# Patient Record
Sex: FEMALE | Race: WHITE | NOT HISPANIC OR LATINO | Employment: OTHER | URBAN - METROPOLITAN AREA
[De-identification: names, ages, dates, MRNs, and addresses within clinical notes are randomized per-mention and may not be internally consistent; named-entity substitution may affect disease eponyms.]

---

## 2017-01-02 ENCOUNTER — APPOINTMENT (OUTPATIENT)
Dept: LAB | Facility: CLINIC | Age: 72
End: 2017-01-02
Payer: COMMERCIAL

## 2017-01-02 DIAGNOSIS — E87.6 HYPOKALEMIA: ICD-10-CM

## 2017-01-02 LAB — POTASSIUM SERPL-SCNC: 3.3 MMOL/L (ref 3.5–5.3)

## 2017-01-02 PROCEDURE — 84132 ASSAY OF SERUM POTASSIUM: CPT

## 2017-01-02 PROCEDURE — 36415 COLL VENOUS BLD VENIPUNCTURE: CPT

## 2017-01-03 ENCOUNTER — GENERIC CONVERSION - ENCOUNTER (OUTPATIENT)
Dept: OTHER | Facility: OTHER | Age: 72
End: 2017-01-03

## 2017-01-06 ENCOUNTER — LAB CONVERSION - ENCOUNTER (OUTPATIENT)
Dept: OTHER | Facility: OTHER | Age: 72
End: 2017-01-06

## 2017-01-06 ENCOUNTER — GENERIC CONVERSION - ENCOUNTER (OUTPATIENT)
Dept: OTHER | Facility: OTHER | Age: 72
End: 2017-01-06

## 2017-01-06 LAB
ADDITIONAL INFORMATION (HISTORICAL): NORMAL
ADEQUACY: (HISTORICAL): NORMAL
COMMENT (HISTORICAL): NORMAL
CYTOTECHNOLOGIST: (HISTORICAL): NORMAL
HPV MRNA E6/E7 (HISTORICAL): NOT DETECTED
INTERPRETATION (HISTORICAL): NORMAL
LMP (HISTORICAL): NORMAL
PREV. BX: (HISTORICAL): NORMAL
PREV. PAP (HISTORICAL): NORMAL
REVIEWED BY (HISTORICAL): NORMAL
SOURCE (HISTORICAL): NORMAL

## 2017-01-09 DIAGNOSIS — E87.6 HYPOKALEMIA: ICD-10-CM

## 2017-01-10 ENCOUNTER — APPOINTMENT (OUTPATIENT)
Dept: LAB | Facility: CLINIC | Age: 72
End: 2017-01-10
Payer: COMMERCIAL

## 2017-01-10 DIAGNOSIS — E87.6 HYPOKALEMIA: ICD-10-CM

## 2017-01-10 LAB — POTASSIUM SERPL-SCNC: 4 MMOL/L (ref 3.5–5.3)

## 2017-01-10 PROCEDURE — 36415 COLL VENOUS BLD VENIPUNCTURE: CPT

## 2017-01-10 PROCEDURE — 84132 ASSAY OF SERUM POTASSIUM: CPT

## 2017-01-11 ENCOUNTER — GENERIC CONVERSION - ENCOUNTER (OUTPATIENT)
Dept: OTHER | Facility: OTHER | Age: 72
End: 2017-01-11

## 2017-02-01 ENCOUNTER — ANESTHESIA EVENT (OUTPATIENT)
Dept: GASTROENTEROLOGY | Facility: HOSPITAL | Age: 72
End: 2017-02-01
Payer: COMMERCIAL

## 2017-02-02 ENCOUNTER — GENERIC CONVERSION - ENCOUNTER (OUTPATIENT)
Dept: OTHER | Facility: OTHER | Age: 72
End: 2017-02-02

## 2017-02-02 ENCOUNTER — ANESTHESIA (OUTPATIENT)
Dept: GASTROENTEROLOGY | Facility: HOSPITAL | Age: 72
End: 2017-02-02
Payer: COMMERCIAL

## 2017-02-02 ENCOUNTER — HOSPITAL ENCOUNTER (OUTPATIENT)
Facility: HOSPITAL | Age: 72
Setting detail: OUTPATIENT SURGERY
Discharge: HOME | End: 2017-02-02
Attending: INTERNAL MEDICINE | Admitting: INTERNAL MEDICINE
Payer: COMMERCIAL

## 2017-02-02 VITALS
RESPIRATION RATE: 16 BRPM | SYSTOLIC BLOOD PRESSURE: 119 MMHG | HEART RATE: 91 BPM | TEMPERATURE: 97.6 F | WEIGHT: 165 LBS | DIASTOLIC BLOOD PRESSURE: 75 MMHG | OXYGEN SATURATION: 98 % | HEIGHT: 65 IN | BODY MASS INDEX: 27.49 KG/M2

## 2017-02-02 DIAGNOSIS — R19.4 CHANGE IN BOWEL HABITS: ICD-10-CM

## 2017-02-02 PROCEDURE — 88305 TISSUE EXAM BY PATHOLOGIST: CPT | Performed by: INTERNAL MEDICINE

## 2017-02-02 RX ORDER — PROPOFOL 10 MG/ML
INJECTION, EMULSION INTRAVENOUS AS NEEDED
Status: DISCONTINUED | OUTPATIENT
Start: 2017-02-02 | End: 2017-02-02 | Stop reason: SURG

## 2017-02-02 RX ORDER — SODIUM CHLORIDE, SODIUM LACTATE, POTASSIUM CHLORIDE, CALCIUM CHLORIDE 600; 310; 30; 20 MG/100ML; MG/100ML; MG/100ML; MG/100ML
125 INJECTION, SOLUTION INTRAVENOUS CONTINUOUS
Status: DISCONTINUED | OUTPATIENT
Start: 2017-02-02 | End: 2017-02-02 | Stop reason: HOSPADM

## 2017-02-02 RX ORDER — HYDROCHLOROTHIAZIDE 25 MG/1
25 TABLET ORAL DAILY
COMMUNITY
End: 2018-03-08

## 2017-02-02 RX ORDER — AMLODIPINE BESYLATE 10 MG/1
10 TABLET ORAL DAILY
COMMUNITY
End: 2018-03-08 | Stop reason: SDUPTHER

## 2017-02-02 RX ORDER — LIDOCAINE HYDROCHLORIDE 10 MG/ML
INJECTION, SOLUTION INFILTRATION; PERINEURAL AS NEEDED
Status: DISCONTINUED | OUTPATIENT
Start: 2017-02-02 | End: 2017-02-02 | Stop reason: SURG

## 2017-02-02 RX ORDER — GLYCOPYRROLATE 0.2 MG/ML
INJECTION INTRAMUSCULAR; INTRAVENOUS AS NEEDED
Status: DISCONTINUED | OUTPATIENT
Start: 2017-02-02 | End: 2017-02-02 | Stop reason: SURG

## 2017-02-02 RX ADMIN — PROPOFOL 100 MG: 10 INJECTION, EMULSION INTRAVENOUS at 11:35

## 2017-02-02 RX ADMIN — LIDOCAINE HYDROCHLORIDE 50 MG: 10 INJECTION, SOLUTION INFILTRATION; PERINEURAL at 11:35

## 2017-02-02 RX ADMIN — PROPOFOL 50 MG: 10 INJECTION, EMULSION INTRAVENOUS at 11:37

## 2017-02-02 RX ADMIN — PROPOFOL 50 MG: 10 INJECTION, EMULSION INTRAVENOUS at 11:40

## 2017-02-02 RX ADMIN — SODIUM CHLORIDE, SODIUM LACTATE, POTASSIUM CHLORIDE, AND CALCIUM CHLORIDE 125 ML/HR: .6; .31; .03; .02 INJECTION, SOLUTION INTRAVENOUS at 10:22

## 2017-02-02 RX ADMIN — GLYCOPYRROLATE 0.2 MG: 0.2 INJECTION INTRAMUSCULAR; INTRAVENOUS at 11:37

## 2017-02-05 ENCOUNTER — GENERIC CONVERSION - ENCOUNTER (OUTPATIENT)
Dept: OTHER | Facility: OTHER | Age: 72
End: 2017-02-05

## 2017-03-13 ENCOUNTER — APPOINTMENT (OUTPATIENT)
Dept: LAB | Facility: CLINIC | Age: 72
End: 2017-03-13
Payer: COMMERCIAL

## 2017-03-13 DIAGNOSIS — E87.6 HYPOKALEMIA: ICD-10-CM

## 2017-03-13 LAB
ALBUMIN SERPL BCP-MCNC: 3.5 G/DL (ref 3.5–5)
ALP SERPL-CCNC: 80 U/L (ref 46–116)
ALT SERPL W P-5'-P-CCNC: 34 U/L (ref 12–78)
ANION GAP SERPL CALCULATED.3IONS-SCNC: 7 MMOL/L (ref 4–13)
AST SERPL W P-5'-P-CCNC: 23 U/L (ref 5–45)
BILIRUB SERPL-MCNC: 0.86 MG/DL (ref 0.2–1)
BUN SERPL-MCNC: 23 MG/DL (ref 5–25)
CALCIUM SERPL-MCNC: 9 MG/DL (ref 8.3–10.1)
CHLORIDE SERPL-SCNC: 106 MMOL/L (ref 100–108)
CO2 SERPL-SCNC: 30 MMOL/L (ref 21–32)
CREAT SERPL-MCNC: 0.82 MG/DL (ref 0.6–1.3)
GFR SERPL CREATININE-BSD FRML MDRD: >60 ML/MIN/1.73SQ M
GLUCOSE SERPL-MCNC: 85 MG/DL (ref 65–140)
POTASSIUM SERPL-SCNC: 3.4 MMOL/L (ref 3.5–5.3)
PROT SERPL-MCNC: 7.4 G/DL (ref 6.4–8.2)
SODIUM SERPL-SCNC: 143 MMOL/L (ref 136–145)

## 2017-03-13 PROCEDURE — 80053 COMPREHEN METABOLIC PANEL: CPT

## 2017-03-13 PROCEDURE — 36415 COLL VENOUS BLD VENIPUNCTURE: CPT

## 2017-03-19 ENCOUNTER — GENERIC CONVERSION - ENCOUNTER (OUTPATIENT)
Dept: OTHER | Facility: OTHER | Age: 72
End: 2017-03-19

## 2017-04-13 ENCOUNTER — ALLSCRIPTS OFFICE VISIT (OUTPATIENT)
Dept: OTHER | Facility: OTHER | Age: 72
End: 2017-04-13

## 2017-07-03 ENCOUNTER — APPOINTMENT (OUTPATIENT)
Dept: LAB | Facility: CLINIC | Age: 72
End: 2017-07-03
Payer: COMMERCIAL

## 2017-07-03 DIAGNOSIS — E87.6 HYPOKALEMIA: ICD-10-CM

## 2017-07-03 LAB — POTASSIUM SERPL-SCNC: 3.5 MMOL/L (ref 3.5–5.3)

## 2017-07-03 PROCEDURE — 84132 ASSAY OF SERUM POTASSIUM: CPT

## 2017-07-03 PROCEDURE — 36415 COLL VENOUS BLD VENIPUNCTURE: CPT

## 2017-07-05 ENCOUNTER — GENERIC CONVERSION - ENCOUNTER (OUTPATIENT)
Dept: OTHER | Facility: OTHER | Age: 72
End: 2017-07-05

## 2017-09-18 ENCOUNTER — APPOINTMENT (OUTPATIENT)
Dept: LAB | Facility: CLINIC | Age: 72
End: 2017-09-18
Payer: COMMERCIAL

## 2017-09-18 ENCOUNTER — TRANSCRIBE ORDERS (OUTPATIENT)
Dept: LAB | Facility: CLINIC | Age: 72
End: 2017-09-18

## 2017-09-18 DIAGNOSIS — I10 ESSENTIAL (PRIMARY) HYPERTENSION: ICD-10-CM

## 2017-09-18 DIAGNOSIS — E87.6 HYPOKALEMIA: ICD-10-CM

## 2017-09-18 DIAGNOSIS — R80.9 PROTEINURIA, UNSPECIFIED TYPE: Primary | ICD-10-CM

## 2017-09-18 LAB
ALBUMIN SERPL BCP-MCNC: 3.4 G/DL (ref 3.5–5)
ALP SERPL-CCNC: 76 U/L (ref 46–116)
ALT SERPL W P-5'-P-CCNC: 43 U/L (ref 12–78)
ANION GAP SERPL CALCULATED.3IONS-SCNC: 6 MMOL/L (ref 4–13)
AST SERPL W P-5'-P-CCNC: 55 U/L (ref 5–45)
BASOPHILS # BLD AUTO: 0.08 THOUSANDS/ΜL (ref 0–0.1)
BASOPHILS NFR BLD AUTO: 1 % (ref 0–1)
BILIRUB SERPL-MCNC: 0.78 MG/DL (ref 0.2–1)
BUN SERPL-MCNC: 20 MG/DL (ref 5–25)
CALCIUM SERPL-MCNC: 9.2 MG/DL (ref 8.3–10.1)
CHLORIDE SERPL-SCNC: 105 MMOL/L (ref 100–108)
CHOLEST SERPL-MCNC: 183 MG/DL (ref 50–200)
CO2 SERPL-SCNC: 32 MMOL/L (ref 21–32)
CREAT SERPL-MCNC: 0.82 MG/DL (ref 0.6–1.3)
EOSINOPHIL # BLD AUTO: 0.16 THOUSAND/ΜL (ref 0–0.61)
EOSINOPHIL NFR BLD AUTO: 2 % (ref 0–6)
ERYTHROCYTE [DISTWIDTH] IN BLOOD BY AUTOMATED COUNT: 12.6 % (ref 11.6–15.1)
GFR SERPL CREATININE-BSD FRML MDRD: 71 ML/MIN/1.73SQ M
GLUCOSE SERPL-MCNC: 79 MG/DL (ref 65–140)
HCT VFR BLD AUTO: 37.6 % (ref 34.8–46.1)
HDLC SERPL-MCNC: 78 MG/DL (ref 40–60)
HGB BLD-MCNC: 12.6 G/DL (ref 11.5–15.4)
LDLC SERPL CALC-MCNC: 89 MG/DL (ref 0–100)
LYMPHOCYTES # BLD AUTO: 2.66 THOUSANDS/ΜL (ref 0.6–4.47)
LYMPHOCYTES NFR BLD AUTO: 35 % (ref 14–44)
MCH RBC QN AUTO: 31.8 PG (ref 26.8–34.3)
MCHC RBC AUTO-ENTMCNC: 33.5 G/DL (ref 31.4–37.4)
MCV RBC AUTO: 95 FL (ref 82–98)
MONOCYTES # BLD AUTO: 0.44 THOUSAND/ΜL (ref 0.17–1.22)
MONOCYTES NFR BLD AUTO: 6 % (ref 4–12)
NEUTROPHILS # BLD AUTO: 4.19 THOUSANDS/ΜL (ref 1.85–7.62)
NEUTS SEG NFR BLD AUTO: 56 % (ref 43–75)
NRBC BLD AUTO-RTO: 0 /100 WBCS
PLATELET # BLD AUTO: 238 THOUSANDS/UL (ref 149–390)
PMV BLD AUTO: 12.6 FL (ref 8.9–12.7)
POTASSIUM SERPL-SCNC: 3.4 MMOL/L (ref 3.5–5.3)
PROT SERPL-MCNC: 6.9 G/DL (ref 6.4–8.2)
RBC # BLD AUTO: 3.96 MILLION/UL (ref 3.81–5.12)
SODIUM SERPL-SCNC: 143 MMOL/L (ref 136–145)
TRIGL SERPL-MCNC: 78 MG/DL
WBC # BLD AUTO: 7.55 THOUSAND/UL (ref 4.31–10.16)

## 2017-09-18 PROCEDURE — 85025 COMPLETE CBC W/AUTO DIFF WBC: CPT

## 2017-09-18 PROCEDURE — 80053 COMPREHEN METABOLIC PANEL: CPT

## 2017-09-18 PROCEDURE — 80061 LIPID PANEL: CPT

## 2017-09-18 PROCEDURE — 36415 COLL VENOUS BLD VENIPUNCTURE: CPT

## 2017-09-19 ENCOUNTER — TRANSCRIBE ORDERS (OUTPATIENT)
Dept: LAB | Facility: CLINIC | Age: 72
End: 2017-09-19

## 2017-09-19 ENCOUNTER — APPOINTMENT (OUTPATIENT)
Dept: LAB | Facility: CLINIC | Age: 72
End: 2017-09-19
Payer: COMMERCIAL

## 2017-09-19 ENCOUNTER — GENERIC CONVERSION - ENCOUNTER (OUTPATIENT)
Dept: OTHER | Facility: OTHER | Age: 72
End: 2017-09-19

## 2017-09-19 DIAGNOSIS — R80.9 PROTEINURIA, UNSPECIFIED TYPE: Primary | ICD-10-CM

## 2017-09-19 LAB
CREAT UR-MCNC: 66.6 MG/DL
MICROALBUMIN UR-MCNC: <5 MG/L (ref 0–20)
MICROALBUMIN/CREAT 24H UR: <8 MG/G CREATININE (ref 0–30)

## 2017-09-19 PROCEDURE — 82043 UR ALBUMIN QUANTITATIVE: CPT

## 2017-09-19 PROCEDURE — 82570 ASSAY OF URINE CREATININE: CPT

## 2017-09-21 ENCOUNTER — ALLSCRIPTS OFFICE VISIT (OUTPATIENT)
Dept: OTHER | Facility: OTHER | Age: 72
End: 2017-09-21

## 2017-09-21 ENCOUNTER — GENERIC CONVERSION - ENCOUNTER (OUTPATIENT)
Dept: OTHER | Facility: OTHER | Age: 72
End: 2017-09-21

## 2017-10-16 ENCOUNTER — APPOINTMENT (OUTPATIENT)
Dept: LAB | Facility: CLINIC | Age: 72
End: 2017-10-16
Payer: COMMERCIAL

## 2017-10-16 DIAGNOSIS — E87.6 HYPOKALEMIA: ICD-10-CM

## 2017-10-16 LAB — POTASSIUM SERPL-SCNC: 3.6 MMOL/L (ref 3.5–5.3)

## 2017-10-16 PROCEDURE — 36415 COLL VENOUS BLD VENIPUNCTURE: CPT

## 2017-10-16 PROCEDURE — 84132 ASSAY OF SERUM POTASSIUM: CPT

## 2017-10-17 ENCOUNTER — GENERIC CONVERSION - ENCOUNTER (OUTPATIENT)
Dept: OTHER | Facility: OTHER | Age: 72
End: 2017-10-17

## 2018-01-09 NOTE — RESULT NOTES
Verified Results  (1) [facility] [id number] 07:02AM [facility]: [id number]     Test Name Result Flag Reference   POTASSIUM 3 6 mmol/L  3 5-5 3

## 2018-01-09 NOTE — RESULT NOTES
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  This was completely removed but because of the nature of the polyp repeat colonoscopy in 3 months     Verified Results  (1) TISSUE EXAM [id number] 11:41AM Randa Romeo     Test Name Result Flag Reference   LAB AP CASE REPORT (Report)     Surgical Pathology Report             Case: [id number]                   Authorizing Provider: Suzie Nunes MD     Collected:      02/02/2017 1141        Ordering Location:   [facility]    [facility]      [facility]                               Pathologist:      Enrique Cedeno MD                              Specimens:  A) - Large Intestine, Sigmoid Colon, Sigmoid colon polyp hot snare                   B) - Large Intestine, Cecum, Cecal bx   LAB AP FINAL DIAGNOSIS (Report)     A  Sigmoid colon:  - Tubulovillous adenoma (villoadenomatous polyp), present at cauterized   stalk margin   - No high grade dysplasia and no evidence of malignancy  B  Cecal polyp:  - Benign polypoid colonic mucosa with prominent submucosal adipose tissue   - No epithelial dysplasia and no evidence of malignancy  Interpretation performed at [facility]  Electronically signed by Enrique Cedeno MD on 2/4/2017 at 11:52 AM   LAB AP SURGICAL ADDITIONAL INFORMATION (Report)     These tests were developed and their performance characteristics   determined by [facility] or [facility]  They may not be cleared or approved by the [place]  Food and   Drug Administration  The FDA has determined that such clearance or   approval is not necessary  These tests are used for clinical purposes  They should not be regarded as investigational or for research   This   laboratory has been approved by CLIA 88, designated as a high-complexity   laboratory and is qualified to perform these tests  LAB AP GROSS DESCRIPTION (Report)     A  The specimen is received in formalin, labeled with the patient's name   and hospital number, and is designated sigmoid colon polyp   The   specimen consists of a single rubbery red-tan sessile polyp measuring 1 0   x 0 8 x 0 5 cm in greatest dimension  An apparent resected margin is inked   blue  Bisected  Entirely submitted  One cassette  B  The specimen is received in formalin, labeled with the patient's name   and hospital number, and is designated cecal polyp    The specimen   consists of 3 rubbery tan tissue fragments measuring from 0 2 up to 0 3   cm in greatest dimension  Entirely submitted  One cassette  Note: The estimated total formalin fixation time based upon information   provided by the submitting clinician and the standard processing schedule   is 8 75 hours  SRS

## 2018-01-10 NOTE — RESULT NOTES
Message   Please mail order to repeat CMP before her fup  Verified Results  (1) POTASSIUM [id number] 06:57AM Anayeli Promise Order Number: [id number]     Test Name Result Flag Reference   POTASSIUM 4 0 mmol/L  3 5-5 3       Plan  Hypokalemia    · (1) COMPREHENSIVE METABOLIC PANEL; Status:Active;  Requested for:13Mar2017;

## 2018-01-12 VITALS
HEIGHT: 64 IN | DIASTOLIC BLOOD PRESSURE: 74 MMHG | WEIGHT: 169.5 LBS | BODY MASS INDEX: 28.94 KG/M2 | SYSTOLIC BLOOD PRESSURE: 136 MMHG | RESPIRATION RATE: 17 BRPM

## 2018-01-12 NOTE — RESULT NOTES
Verified Results  (1) COMPREHENSIVE METABOLIC PANEL [id number] 87:90NR Flory Fry Order Number: [id number]     Test Name Result Flag Reference   GLUCOSE,RANDM 85 mg/dL     If the patient is fasting, the ADA then defines impaired fasting glucose as > 100 mg/dL and diabetes as > or equal to 123 mg/dL  SODIUM 143 mmol/L  136-145   POTASSIUM 3 4 mmol/L L 3 5-5 3   CHLORIDE 106 mmol/L  100-108   CARBON DIOXIDE 30 mmol/L  21-32   ANION GAP (CALC) 7 mmol/L  4-13   BLOOD UREA NITROGEN 23 mg/dL  5-25   CREATININE 0 82 mg/dL  0 60-1 30   Standardized to IDMS reference method   CALCIUM 9 0 mg/dL  8 3-10 1   BILI, TOTAL 0 86 mg/dL  0 20-1 00   ALK PHOSPHATAS 80 U/L     ALT (SGPT) 34 U/L  12-78   AST(SGOT) 23 U/L  5-45   ALBUMIN 3 5 g/dL  3 5-5 0   TOTAL PROTEIN 7 4 g/dL  6 4-8 2   eGFR Non-African American      >60 0 ml/min/1 73sq [facility] Disease Education Program recommendations are as follows:  GFR calculation is accurate only with a steady state creatinine  Chronic Kidney disease less than 60 ml/min/1 73 sq  meters  Kidney failure less than 15 ml/min/1 73 sq  meters

## 2018-01-12 NOTE — RESULT NOTES
Verified Results  *VB - Urinary Incontinence Screen (Dx Z13 89 Screen for UI) 21Sep2017 05:30PM [facility]     Test Name Result Flag Reference   Urinary Incontinence Assessment 21Sep2017- normal                   (1) MICROALBUMIN CREATININE RATIO, RANDOM URINE 19Sep2017 07:40AM [facility]     Test Name Result Flag Reference   MICROALBUMIN/ CREAT R <8 mg/g creatinine  0-30   MICROALBUMIN,URINE <5 0 mg/L  0 0-20 0   CREATININE URINE 66 6 mg/dL

## 2018-01-13 VITALS
WEIGHT: 168 LBS | HEART RATE: 87 BPM | DIASTOLIC BLOOD PRESSURE: 78 MMHG | HEIGHT: 64 IN | SYSTOLIC BLOOD PRESSURE: 142 MMHG | BODY MASS INDEX: 28.68 KG/M2

## 2018-01-13 NOTE — RESULT NOTES
Verified Results  THINPREP TIS AND HPV mRNA E6/E7 [id number] 12:00AM Tati Amador     Test Name Result Flag Reference   CLINICAL INFORMATION:      7 or more years since last Pap  Normal exam   LMP:      27 YEARS AGO   PREV  PAP:      YEARS AGO   PREV  BX:      NA   SOURCE:      Cervix   STATEMENT OF ADEQUACY:      Satisfactory for evaluation  Endocervical/transformation zone component  present  INTERPRETATION/RESULT:      Negative for intraepithelial lesion or malignancy  COMMENT:      This Pap test has been evaluated with computer  assisted technology  CYTOTECHNOLOGIST:      SHRUTI RAMIREZ(ASCP)  CT screening location: [address] [place] [address], [address]   REVIEW CYTOTECHNOLOGIST:      SHRUTI Sanchez(ASCP)  CT screening location: [address]  [address]   HPV mRNA E6/E7 Not Detected  Not Detected   This test was performed using the APTIMA HPV Assay ([employer] )  This assay detects E6/E7 viral messenger RNA (mRNA) from 14  high-risk HPV types (16,18,31,33,35,39,45,51,52,56,58,59,66,68)  Discussion/Summary   Alla your Pap smear has been reported normal  Please call the office if you have any questions or concerns with the results

## 2018-01-13 NOTE — RESULT NOTES
Verified Results  (1) CBC/PLT/DIFF 18Sep2017 06:57AM Lopez Srinivasan Order Number: [id number]     Test Name Result Flag Reference   WBC COUNT 7 55 Thousand/uL  4 31-10 16   RBC COUNT 3 96 Million/uL  3 81-5 12   HEMOGLOBIN 12 6 g/dL  11 5-15 4   HEMATOCRIT 37 6 %  34 8-46  1   MCV 95 fL  82-98   MCH 31 8 pg  26 8-34 3   MCHC 33 5 g/dL  31 4-37 4   RDW 12 6 %  11 6-15 1   MPV 12 6 fL  8 9-12 7   PLATELET COUNT 436 Thousands/uL  149-390   nRBC AUTOMATED 0 /100 WBCs     NEUTROPHILS RELATIVE PERCENT 56 %  43-75   LYMPHOCYTES RELATIVE PERCENT 35 %  14-44   MONOCYTES RELATIVE PERCENT 6 %  4-12   EOSINOPHILS RELATIVE PERCENT 2 %  0-6   BASOPHILS RELATIVE PERCENT 1 %  0-1   NEUTROPHILS ABSOLUTE COUNT 4 19 Thousands/? ??L  1 85-7 62   LYMPHOCYTES ABSOLUTE COUNT 2 66 Thousands/? ??L  0 60-4 47   MONOCYTES ABSOLUTE COUNT 0 44 Thousand/? ??L  0 17-1 22   EOSINOPHILS ABSOLUTE COUNT 0 16 Thousand/? ??L  0 00-0 61   BASOPHILS ABSOLUTE COUNT 0 08 Thousands/? ??L  0 00-0 10   - Patient Instructions: This bloodwork is non-fasting  Please drink two glasses of water morning of bloodwork  (1) COMPREHENSIVE METABOLIC PANEL [id number] 50:12MC Lopez Srinivasan Order Number: [id number]     Test Name Result Flag Reference   GLUCOSE,RANDM 79 mg/dL     If the patient is fasting, the ADA then defines impaired fasting glucose as > 100 mg/dL and diabetes as > or equal to 123 mg/dL  Specimen collection should occur prior to Sulfasalazine administration due to the potential for falsely depressed results  Specimen collection should occur prior to Sulfapyridine administration due to the potential for falsely elevated results     SODIUM 143 mmol/L  136-145   POTASSIUM 3 4 mmol/L L 3 5-5 3   CHLORIDE 105 mmol/L  100-108   CARBON DIOXIDE 32 mmol/L  21-32   ANION GAP (CALC) 6 mmol/L  4-13   BLOOD UREA NITROGEN 20 mg/dL  5-25   CREATININE 0 82 mg/dL  0 60-1 30   Standardized to IDMS reference method   CALCIUM 9 2 mg/dL 8  3-10 1   BILI, TOTAL 0 78 mg/dL  0 20-1 00   ALK PHOSPHATAS 76 U/L     ALT (SGPT) 43 U/L  12-78   Specimen collection should occur prior to Sulfasalazine and/or Sulfapyridine administration due to the potential for falsely depressed results  AST(SGOT) 55 U/L H 5-45   Specimen collection should occur prior to Sulfasalazine administration due to the potential for falsely depressed results  ALBUMIN 3 4 g/dL L 3 5-5 0   TOTAL PROTEIN 6 9 g/dL  6 4-8 2   eGFR 71 ml/min/1 73sq m     National Kidney Disease Education Program recommendations are as follows:  GFR calculation is accurate only with a steady state creatinine  Chronic Kidney disease less than 60 ml/min/1 73 sq  meters  Kidney failure less than 15 ml/min/1 73 sq  meters  (1) LIPID PANEL, FASTING [id number] 06:57AM Emilia Brito Order Number: [id number]     Test Name Result Flag Reference   CHOLESTEROL 183 mg/dL     HDL,DIRECT 78 mg/dL H 40-60   Specimen collection should occur prior to Metamizole administration due to the potential for falsley depressed results  LDL CHOLESTEROL CALCULATED 89 mg/dL  0-100   - Patient Instructions: This is a fasting blood test  Water,black tea or black  coffee only after 9:00pm the night before test   Drink 2 glasses of water the morning of test       Triglyceride:        Normal <150 mg/dl   Borderline High 150-199 mg/dl   High 200-499 mg/dl   Very High >499 mg/dl      Cholesterol:       Desirable <200 mg/dl    Borderline High 200-239 mg/dl    High >239 mg/dl      HDL Cholesterol:       High>59 mg/dL    Low <41 mg/dL      This screening LDL is a calculated result  It does not have the accuracy of the Direct Measured LDL in the monitoring of patients with hyperlipidemia and/or statin therapy  Direct Measure LDL (GRG977) must be ordered separately in these patients     TRIGLYCERIDES 78 mg/dL  <=150   Specimen collection should occur prior to N-Acetylcysteine or Metamizole administration due to the potential for falsely depressed results

## 2018-01-13 NOTE — RESULT NOTES
Verified Results  (1) LIPID PANEL, FASTING [id number] 07:15AM Tati Amador     Test Name Result Flag Reference   CHOLESTEROL 192 mg/dL     HDL,DIRECT 120 mg/dL H 40-60   Specimen collection should occur prior to Metamizole administration due to the potential for falsely depressed results  LDL CHOLESTEROL CALCULATED 61 mg/dL  0-100   Triglyceride:         Normal              <150 mg/dl       Borderline High    150-199 mg/dl       High               200-499 mg/dl       Very High          >499 mg/dl  Cholesterol:         Desirable        <200 mg/dl      Borderline High  200-239 mg/dl      High             >239 mg/dl  HDL Cholesterol:        High    >59 mg/dL      Low     <41 mg/dL  LDL CALCULATED:    This screening LDL is a calculated result  It does not have the accuracy of the Direct Measured LDL in the monitoring of patients with hyperlipidemia and/or statin therapy  Direct Measure LDL (AGL605) must be ordered separately in these patients  TRIGLYCERIDES 56 mg/dL  <=150   Specimen collection should occur prior to N-Acetylcysteine or Metamizole administration due to the potential for falsely depressed results  (1) TSH [id number] 07:15AM Tati Amador     Test Name Result Flag Reference   TSH 2 300 uIU/mL  0 358-3 740   Patients undergoing fluorescein dye angiography may retain small amounts of fluorescein in the body for 48-72 hours post procedure  Samples containing fluorescein can produce falsely depressed TSH values  If the patient had this procedure,a specimen should be resubmitted post fluorescein clearance            The recommended reference ranges for TSH during pregnancy are as follows:  First trimester 0 1 to 2 5 uIU/mL  Second trimester  0 2 to 3 0 uIU/mL  Third trimester 0 3 to 3 0 uIU/m     (1) MICROALBUMIN CREATININE RATIO, RANDOM URINE [id number] 07:15AM Tati Amador     Test Name Result Flag Reference   MICROALBUMIN/ CREAT R 28 mg/g creatinine  0-30   MICROALBUMIN,URINE 65 7 mg/L H 0 0-20 0   CREATININE URINE 238 0 mg/dL

## 2018-01-15 NOTE — RESULT NOTES
Verified Results  (1) COMPREHENSIVE METABOLIC PANEL [id number] 29:42VF Tati Amador     Test Name Result Flag Reference   GLUCOSE,RANDM 120 mg/dL     If the patient is fasting, the ADA then defines impaired fasting glucose as > 100 mg/dL and diabetes as > or equal to 123 mg/dL  SODIUM 143 mmol/L  136-145   POTASSIUM 2 9 mmol/L L 3 5-5 3   CHLORIDE 104 mmol/L  100-108   CARBON DIOXIDE 31 mmol/L  21-32   ANION GAP (CALC) 8 mmol/L  4-13   BLOOD UREA NITROGEN 20 mg/dL  5-25   CREATININE 0 92 mg/dL  0 60-1 30   Standardized to IDMS reference method   CALCIUM 9 4 mg/dL  8 3-10 1   BILI, TOTAL 0 89 mg/dL  0 20-1 00   ALK PHOSPHATAS 85 U/L     ALT (SGPT) 73 U/L  12-78   AST(SGOT) 38 U/L  5-45   ALBUMIN 3 8 g/dL  3 5-5 0   TOTAL PROTEIN 7 6 g/dL  6 4-8 2   eGFR Non-African American 60 0 ml/min/1 73sq [facility] Disease Education Program recommendations are as follows:  GFR calculation is accurate only with a steady state creatinine  Chronic Kidney disease less than 60 ml/min/1 73 sq  meters  Kidney failure less than 15 ml/min/1 73 sq  meters  (1) CBC/PLT/DIFF [id number] 08:51AM Tati Amador     Test Name Result Flag Reference   WBC COUNT 8 67 Thousand/uL  4 31-10 16   RBC COUNT 4 44 Million/uL  3 81-5 12   HEMOGLOBIN 14 2 g/dL  11 5-15 4   HEMATOCRIT 41 9 %  34 8-46  1   MCV 94 fL  82-98   MCH 32 0 pg  26 8-34 3   MCHC 33 9 g/dL  31 4-37 4   RDW 12 3 %  11 6-15 1   MPV 13 5 fL H 8 9-12 7   PLATELET COUNT 270 Thousands/uL  149-390   nRBC AUTOMATED 0 /100 WBCs     NEUTROPHILS RELATIVE PERCENT 73 %  43-75   LYMPHOCYTES RELATIVE PERCENT 21 %  14-44   MONOCYTES RELATIVE PERCENT 5 %  4-12   EOSINOPHILS RELATIVE PERCENT 1 %  0-6   BASOPHILS RELATIVE PERCENT 0 %  0-1   NEUTROPHILS ABSOLUTE COUNT 6 28 Thousands/?L  1 85-7 62   LYMPHOCYTES ABSOLUTE COUNT 1 81 Thousands/?L  0 60-4 47   MONOCYTES ABSOLUTE COUNT 0 45 Thousand/?L  0 17-1 22   EOSINOPHILS ABSOLUTE COUNT 0 06 Thousand/?L  0 00-0 61 BASOPHILS ABSOLUTE COUNT 0 03 Thousands/?L  0 00-0 10   - Patient Instructions: This bloodwork is non-fasting  Please drink two glasses of water morning of bloodwork  - Patient Instructions: This bloodwork is non-fasting  Please drink two glasses of water morning of bloodwork

## 2018-01-15 NOTE — RESULT NOTES
Verified Results  (1) METANEPHRINE, PLASMA [id number] 07:15AM Tati Amador     Test Name Result Flag Reference   METANEPHRINE, PLASMA 41 pg/mL  0 - 62   Concentrations of Normetanephrine between 146 and 487 pg/mL, and  Metanephrine between 63 and 255 pg/mL are considered indeterminate  Follow-up biochemical testing is recommended when patient levels fall  within this indeterminate range  These tests include repeat testing of  plasma/urinary fractionated metanephrines and plasma catecholamines    Performed at:  [address]  [id number]  : Tegan Knutson MD, Phone:  [phone]   NORMETANEPHRINE, PLASMA 99 pg/mL  0 - 145

## 2018-01-15 NOTE — RESULT NOTES
Message   Pt should continue potassiun x 5 more days and repeat levels  Please mail lab script to her address  Verified Results  (1) POTASSIUM 02Jan2017 07:05AM [facility] Order Number: [id number]     Test Name Result Flag Reference   POTASSIUM 3 3 mmol/L L 3 5-5 3       Plan  Hypokalemia    · Potassium Chloride ER 10 MEQ Oral Tablet Extended Release; take one tablet  by mouth every day   · (1) POTASSIUM; Status:Active;  Requested [id number];

## 2018-01-16 NOTE — RESULT NOTES
Verified Results  (1) STOOL CULTURE [id number] 07:15AM Tati Amador     Test Name Result Flag Reference   CLINICAL REPORT (Report)     Test:        Stool culture  Specimen Type:   Stool  Specimen Date:   12/9/2016 7:15 AM  Result Date:    12/12/2016 9:33 AM  Result Status:   Final result  Resulting Lab:    [address] [facility] [address]            Tel: [phone]      CULTURE                                       ------------------                                   No Salmonella, Shigella or Campylobacter isolated      Shiga Toxin 1 NOT detected, Shiga Toxin 2 NOT detected

## 2018-01-16 NOTE — RESULT NOTES
Verified Results  (1) C  DIFFICILE TOXIN BY PCR [id number] 07:15AM Tati Amador     Test Name Result Flag Reference   C  DIFFICILE TOXIN BY PCR   NEGATIVE for C difficle toxin by PCR  NEGATIVE for C difficle toxin by PCR

## 2018-01-17 NOTE — RESULT NOTES
Discussion/Summary   Alla your potassium levels are within normal limits  These call the office to schedule a follow-up if you have any questions or concerns with the results       Verified Results  (1) POTASSIUM [id number] 06:59AM Blayne Caban Order Number: [id number]     Test Name Result Flag Reference   POTASSIUM 3 5 mmol/L  3 5-5 3

## 2018-02-26 DIAGNOSIS — E87.6 HYPOKALEMIA: ICD-10-CM

## 2018-02-26 DIAGNOSIS — I10 ESSENTIAL (PRIMARY) HYPERTENSION: ICD-10-CM

## 2018-02-26 DIAGNOSIS — R80.9 PROTEINURIA: ICD-10-CM

## 2018-02-26 DIAGNOSIS — F43.22 ADJUSTMENT DISORDER WITH ANXIETY: ICD-10-CM

## 2018-03-02 ENCOUNTER — APPOINTMENT (OUTPATIENT)
Dept: LAB | Facility: CLINIC | Age: 73
End: 2018-03-02
Payer: COMMERCIAL

## 2018-03-02 DIAGNOSIS — I10 ESSENTIAL (PRIMARY) HYPERTENSION: ICD-10-CM

## 2018-03-02 DIAGNOSIS — F43.22 ADJUSTMENT DISORDER WITH ANXIETY: ICD-10-CM

## 2018-03-02 DIAGNOSIS — R80.9 PROTEINURIA: ICD-10-CM

## 2018-03-02 DIAGNOSIS — E87.6 HYPOKALEMIA: ICD-10-CM

## 2018-03-02 LAB
ALBUMIN SERPL BCP-MCNC: 3.4 G/DL (ref 3.5–5)
ALP SERPL-CCNC: 71 U/L (ref 46–116)
ALT SERPL W P-5'-P-CCNC: 33 U/L (ref 12–78)
ANION GAP SERPL CALCULATED.3IONS-SCNC: 6 MMOL/L (ref 4–13)
AST SERPL W P-5'-P-CCNC: 25 U/L (ref 5–45)
BASOPHILS # BLD AUTO: 0.05 THOUSANDS/ΜL (ref 0–0.1)
BASOPHILS NFR BLD AUTO: 1 % (ref 0–1)
BILIRUB SERPL-MCNC: 0.67 MG/DL (ref 0.2–1)
BUN SERPL-MCNC: 19 MG/DL (ref 5–25)
CALCIUM SERPL-MCNC: 8.5 MG/DL (ref 8.3–10.1)
CHLORIDE SERPL-SCNC: 103 MMOL/L (ref 100–108)
CHOLEST SERPL-MCNC: 180 MG/DL (ref 50–200)
CO2 SERPL-SCNC: 31 MMOL/L (ref 21–32)
CREAT SERPL-MCNC: 0.73 MG/DL (ref 0.6–1.3)
EOSINOPHIL # BLD AUTO: 0.13 THOUSAND/ΜL (ref 0–0.61)
EOSINOPHIL NFR BLD AUTO: 2 % (ref 0–6)
ERYTHROCYTE [DISTWIDTH] IN BLOOD BY AUTOMATED COUNT: 12.6 % (ref 11.6–15.1)
GFR SERPL CREATININE-BSD FRML MDRD: 81 ML/MIN/1.73SQ M
GLUCOSE P FAST SERPL-MCNC: 85 MG/DL (ref 65–99)
HCT VFR BLD AUTO: 38.1 % (ref 34.8–46.1)
HDLC SERPL-MCNC: 95 MG/DL (ref 40–60)
HGB BLD-MCNC: 12.6 G/DL (ref 11.5–15.4)
LDLC SERPL CALC-MCNC: 74 MG/DL (ref 0–100)
LYMPHOCYTES # BLD AUTO: 2.19 THOUSANDS/ΜL (ref 0.6–4.47)
LYMPHOCYTES NFR BLD AUTO: 32 % (ref 14–44)
MCH RBC QN AUTO: 31.3 PG (ref 26.8–34.3)
MCHC RBC AUTO-ENTMCNC: 33.1 G/DL (ref 31.4–37.4)
MCV RBC AUTO: 95 FL (ref 82–98)
MONOCYTES # BLD AUTO: 0.38 THOUSAND/ΜL (ref 0.17–1.22)
MONOCYTES NFR BLD AUTO: 6 % (ref 4–12)
NEUTROPHILS # BLD AUTO: 3.97 THOUSANDS/ΜL (ref 1.85–7.62)
NEUTS SEG NFR BLD AUTO: 59 % (ref 43–75)
NRBC BLD AUTO-RTO: 0 /100 WBCS
PLATELET # BLD AUTO: 251 THOUSANDS/UL (ref 149–390)
PMV BLD AUTO: 12.1 FL (ref 8.9–12.7)
POTASSIUM SERPL-SCNC: 3.2 MMOL/L (ref 3.5–5.3)
PROT SERPL-MCNC: 7.1 G/DL (ref 6.4–8.2)
RBC # BLD AUTO: 4.02 MILLION/UL (ref 3.81–5.12)
SODIUM SERPL-SCNC: 140 MMOL/L (ref 136–145)
TRIGL SERPL-MCNC: 55 MG/DL
WBC # BLD AUTO: 6.76 THOUSAND/UL (ref 4.31–10.16)

## 2018-03-02 PROCEDURE — 80053 COMPREHEN METABOLIC PANEL: CPT

## 2018-03-02 PROCEDURE — 85025 COMPLETE CBC W/AUTO DIFF WBC: CPT

## 2018-03-02 PROCEDURE — 36415 COLL VENOUS BLD VENIPUNCTURE: CPT

## 2018-03-02 PROCEDURE — 80061 LIPID PANEL: CPT

## 2018-03-08 ENCOUNTER — OFFICE VISIT (OUTPATIENT)
Dept: FAMILY MEDICINE CLINIC | Facility: CLINIC | Age: 73
End: 2018-03-08
Payer: COMMERCIAL

## 2018-03-08 VITALS
OXYGEN SATURATION: 97 % | SYSTOLIC BLOOD PRESSURE: 138 MMHG | HEIGHT: 65 IN | RESPIRATION RATE: 16 BRPM | WEIGHT: 168 LBS | BODY MASS INDEX: 27.99 KG/M2 | HEART RATE: 62 BPM | DIASTOLIC BLOOD PRESSURE: 74 MMHG

## 2018-03-08 DIAGNOSIS — F43.22 ADJUSTMENT DISORDER WITH ANXIETY: ICD-10-CM

## 2018-03-08 DIAGNOSIS — E87.6 HYPOKALEMIA: ICD-10-CM

## 2018-03-08 DIAGNOSIS — R80.9 MICROALBUMINURIA: ICD-10-CM

## 2018-03-08 DIAGNOSIS — I10 ESSENTIAL HYPERTENSION: Primary | ICD-10-CM

## 2018-03-08 PROCEDURE — 99213 OFFICE O/P EST LOW 20 MIN: CPT | Performed by: FAMILY MEDICINE

## 2018-03-08 RX ORDER — LISINOPRIL 10 MG/1
10 TABLET ORAL DAILY
Qty: 30 TABLET | Refills: 0 | Status: SHIPPED | OUTPATIENT
Start: 2018-03-08 | End: 2018-03-19

## 2018-03-08 RX ORDER — POTASSIUM CHLORIDE 750 MG/1
1 CAPSULE, EXTENDED RELEASE ORAL DAILY
COMMUNITY
Start: 2016-12-28 | End: 2018-03-08

## 2018-03-08 RX ORDER — POTASSIUM CHLORIDE 750 MG/1
10 TABLET, EXTENDED RELEASE ORAL DAILY
Qty: 5 TABLET | Refills: 0 | Status: SHIPPED | OUTPATIENT
Start: 2018-03-08 | End: 2018-03-30 | Stop reason: ALTCHOICE

## 2018-03-08 RX ORDER — ALPRAZOLAM 0.25 MG/1
TABLET, ORALLY DISINTEGRATING ORAL DAILY
COMMUNITY
Start: 2016-12-12 | End: 2018-03-08

## 2018-03-08 RX ORDER — AMLODIPINE BESYLATE 10 MG/1
10 TABLET ORAL DAILY
Qty: 90 TABLET | Refills: 1 | Status: SHIPPED | OUTPATIENT
Start: 2018-03-08 | End: 2018-09-06

## 2018-03-08 NOTE — PROGRESS NOTES
Assessment/Plan:      Patient's blood pressure is at goal, however I suggested to switch the blood pressure medication from hydrochlorothiazide to lisinopril considering that she has  Recurring episodes of hypokalemia  Patient advised to take potassium supplements for 5 days and go for recheck of her potassium levels  I will see her back after 3 weeks for a blood pressure check  She will continue taking amlodipine 10 milligram daily  Diagnoses and all orders for this visit:    Essential hypertension  -     Microalbumin / creatinine urine ratio; Future  -     lisinopril (ZESTRIL) 10 mg tablet; Take 1 tablet (10 mg total) by mouth daily  -     amLODIPine (NORVASC) 10 mg tablet; Take 1 tablet (10 mg total) by mouth daily    Adjustment disorder with anxiety  -     CBC and differential; Future    Hypokalemia  -     Comprehensive metabolic panel; Future  -     Potassium; Future  -     potassium chloride (K-DUR,KLOR-CON) 10 mEq tablet; Take 1 tablet (10 mEq total) by mouth daily    Microalbuminuria  -     Microalbumin / creatinine urine ratio; Future          Subjective:      Patient ID: Mario Thomas is a 74 y o  female  patient is here for teen six-month follow-up  She has a history of hypertension currently well controlled on below mentioned medications  Her recent labs including CBC lipid panel are all within normal limits except for a borderline low potassium of 3 2  Patient has had issues with low potassium in the past as well  Patient states that she increases her intake of potassium rich foods specially bananas and her potassium comes up but she does not like to do that since that gives her abdominal cramps  Hypertension   This is a chronic problem  The current episode started more than 1 year ago  The problem has been gradually improving since onset  The problem is controlled  Pertinent negatives include no chest pain, headaches, palpitations, PND or shortness of breath   There are no associated agents to hypertension  Risk factors for coronary artery disease include post-menopausal state  Treatments tried: Amlodipine 10 milligram, hydrochlorothiazide 25 milligram daily  The current treatment provides significant improvement  There are no compliance problems  Past Medical History:   Diagnosis Date    Hypertension        Family History   Problem Relation Age of Onset    Family history unknown: Yes       Past Surgical History:   Procedure Laterality Date    NO PAST SURGERIES      LA COLONOSCOPY FLX DX W/COLLJ SPEC WHEN PFRMD N/A 2/2/2017    Procedure: COLONOSCOPY;  Surgeon: Darby Caballero MD;  [facility]; Service: Gastroenterology        reports that she has quit smoking  She has never used smokeless tobacco  She reports that she does not drink alcohol or use drugs  Current Outpatient Prescriptions:     amLODIPine (NORVASC) 10 mg tablet, Take 1 tablet (10 mg total) by mouth daily, Disp: 90 tablet, Rfl: 1    lisinopril (ZESTRIL) 10 mg tablet, Take 1 tablet (10 mg total) by mouth daily, Disp: 30 tablet, Rfl: 0    potassium chloride (K-DUR,KLOR-CON) 10 mEq tablet, Take 1 tablet (10 mEq total) by mouth daily, Disp: 5 tablet, Rfl: 0    The following portions of the patient's history were reviewed and updated as appropriate: allergies, current medications, past family history, past medical history, past social history, past surgical history and problem list     Review of Systems   Constitutional: Negative  Respiratory: Negative  Negative for shortness of breath  Cardiovascular: Negative  Negative for chest pain, palpitations and PND  Musculoskeletal: Negative for myalgias  Neurological: Negative  Negative for headaches  Psychiatric/Behavioral: Negative              Objective:    /74 (BP Location: Left arm, Patient Position: Sitting, Cuff Size: Standard)   Pulse 62   Resp 16   Ht 5' 5" (1 651 m)   Wt 76 2 kg (168 lb)   LMP  (LMP Unknown)   SpO2 97% BMI 27 96 kg/m²      Physical Exam   Constitutional: She is oriented to person, place, and time  She appears well-developed and well-nourished  Cardiovascular: Normal rate, regular rhythm and normal heart sounds  Pulmonary/Chest: Effort normal and breath sounds normal    Abdominal: Soft  Bowel sounds are normal    Neurological: She is alert and oriented to person, place, and time     Psychiatric: Her behavior is normal  Judgment normal          Recent Results (from the past 1008 hour(s))   CBC and differential    Collection Time: 03/02/18  8:10 AM   Result Value Ref Range    WBC 6 76 4 31 - 10 16 Thousand/uL    RBC 4 02 3 81 - 5 12 Million/uL    Hemoglobin 12 6 11 5 - 15 4 g/dL    Hematocrit 38 1 34 8 - 46 1 %    MCV 95 82 - 98 fL    MCH 31 3 26 8 - 34 3 pg    MCHC 33 1 31 4 - 37 4 g/dL    RDW 12 6 11 6 - 15 1 %    MPV 12 1 8 9 - 12 7 fL    Platelets 550 033 - 427 Thousands/uL    nRBC 0 /100 WBCs    Neutrophils Relative 59 43 - 75 %    Lymphocytes Relative 32 14 - 44 %    Monocytes Relative 6 4 - 12 %    Eosinophils Relative 2 0 - 6 %    Basophils Relative 1 0 - 1 %    Neutrophils Absolute 3 97 1 85 - 7 62 Thousands/µL    Lymphocytes Absolute 2 19 0 60 - 4 47 Thousands/µL    Monocytes Absolute 0 38 0 17 - 1 22 Thousand/µL    Eosinophils Absolute 0 13 0 00 - 0 61 Thousand/µL    Basophils Absolute 0 05 0 00 - 0 10 Thousands/µL   Comprehensive metabolic panel    Collection Time: 03/02/18  8:10 AM   Result Value Ref Range    Sodium 140 136 - 145 mmol/L    Potassium 3 2 (L) 3 5 - 5 3 mmol/L    Chloride 103 100 - 108 mmol/L    CO2 31 21 - 32 mmol/L    Anion Gap 6 4 - 13 mmol/L    BUN 19 5 - 25 mg/dL    Creatinine 0 73 0 60 - 1 30 mg/dL    Glucose, Fasting 85 65 - 99 mg/dL    Calcium 8 5 8 3 - 10 1 mg/dL    AST 25 5 - 45 U/L    ALT 33 12 - 78 U/L    Alkaline Phosphatase 71 46 - 116 U/L    Total Protein 7 1 6 4 - 8 2 g/dL    Albumin 3 4 (L) 3 5 - 5 0 g/dL    Total Bilirubin 0 67 0 20 - 1 00 mg/dL    eGFR 81 ml/min/1 73sq m   Lipid panel    Collection Time: 03/02/18  8:10 AM   Result Value Ref Range    Cholesterol 180 50 - 200 mg/dL    Triglycerides 55 <=150 mg/dL    HDL, Direct 95 (H) 40 - 60 mg/dL    LDL Calculated 74 0 - 100 mg/dL

## 2018-03-19 ENCOUNTER — TELEPHONE (OUTPATIENT)
Dept: FAMILY MEDICINE CLINIC | Facility: CLINIC | Age: 73
End: 2018-03-19

## 2018-03-19 DIAGNOSIS — I10 ESSENTIAL HYPERTENSION: Primary | ICD-10-CM

## 2018-03-19 RX ORDER — HYDROCHLOROTHIAZIDE 12.5 MG/1
12.5 CAPSULE, GELATIN COATED ORAL DAILY
Qty: 30 CAPSULE | Refills: 0 | Status: SHIPPED | OUTPATIENT
Start: 2018-03-19 | End: 2018-03-30 | Stop reason: SDUPTHER

## 2018-03-19 NOTE — TELEPHONE ENCOUNTER
AMIRA STATES THAT SINCE SHE HAS BEEN TAKING THE LISINOPRIL HER ANKLES HAVE SWELLING  SHE DID STOP TAKING THE LISINOPRIL

## 2018-03-19 NOTE — TELEPHONE ENCOUNTER
Patient restarted on hydrochlorothiazide    She needs to go for potassium check prior to her follow-up

## 2018-03-21 NOTE — TELEPHONE ENCOUNTER
I spoke with patient she advised me she had not started her HCTZ or take the potassium that she was prescribed for 5 days  I spoke with Dr Joel Bhandari patient was now advised to start the HCTZ and continue the Amlodipine  Stop the lisinopril  Do not take the potassium at this time   Repeat her blood work on the 28th of March prior to her 27 th Appointment with Dr Joel Bhandari

## 2018-03-22 NOTE — TELEPHONE ENCOUNTER
The patient came to the window to  a script  I did not have anything in the drawer for her and I was looking in her chart  I said I had lab orders that Dr Judge Miller wanted her to repeat for her next ov  She insisted that she had them done already and I said that Dr Judge Miller wanted her to have them done again and I could give them to her  She said she doesn't want to take the lab scripts because she won't be able to get them done because her only day off is 3/30 the day of her appointment and she did not want to go to [place] to have the labs done on the weekend  I am not sure if she will need this follow up appointment if she does not have these repeat labs done

## 2018-03-30 ENCOUNTER — OFFICE VISIT (OUTPATIENT)
Dept: FAMILY MEDICINE CLINIC | Facility: CLINIC | Age: 73
End: 2018-03-30
Payer: COMMERCIAL

## 2018-03-30 ENCOUNTER — APPOINTMENT (OUTPATIENT)
Dept: LAB | Facility: CLINIC | Age: 73
End: 2018-03-30
Payer: COMMERCIAL

## 2018-03-30 VITALS
WEIGHT: 168.4 LBS | DIASTOLIC BLOOD PRESSURE: 80 MMHG | OXYGEN SATURATION: 95 % | SYSTOLIC BLOOD PRESSURE: 124 MMHG | HEIGHT: 65 IN | BODY MASS INDEX: 28.06 KG/M2 | HEART RATE: 82 BPM | RESPIRATION RATE: 18 BRPM

## 2018-03-30 DIAGNOSIS — R80.9 MICROALBUMINURIA: ICD-10-CM

## 2018-03-30 DIAGNOSIS — I10 ESSENTIAL HYPERTENSION: Primary | ICD-10-CM

## 2018-03-30 DIAGNOSIS — E87.6 HYPOKALEMIA: ICD-10-CM

## 2018-03-30 LAB — POTASSIUM SERPL-SCNC: 3.5 MMOL/L (ref 3.5–5.3)

## 2018-03-30 PROCEDURE — 99213 OFFICE O/P EST LOW 20 MIN: CPT | Performed by: FAMILY MEDICINE

## 2018-03-30 PROCEDURE — 3079F DIAST BP 80-89 MM HG: CPT | Performed by: FAMILY MEDICINE

## 2018-03-30 PROCEDURE — 3074F SYST BP LT 130 MM HG: CPT | Performed by: FAMILY MEDICINE

## 2018-03-30 PROCEDURE — 1101F PT FALLS ASSESS-DOCD LE1/YR: CPT | Performed by: FAMILY MEDICINE

## 2018-03-30 PROCEDURE — 36415 COLL VENOUS BLD VENIPUNCTURE: CPT

## 2018-03-30 PROCEDURE — 84132 ASSAY OF SERUM POTASSIUM: CPT

## 2018-03-30 RX ORDER — HYDROCHLOROTHIAZIDE 12.5 MG/1
12.5 CAPSULE, GELATIN COATED ORAL DAILY
Qty: 90 CAPSULE | Refills: 1 | Status: SHIPPED | OUTPATIENT
Start: 2018-03-30 | End: 2018-09-06 | Stop reason: SDUPTHER

## 2018-03-30 NOTE — PROGRESS NOTES
Subjective:      Patient ID: Get Deluna is a 74 y o  female  Pt here for BP check  On below mentioned medications   For control of her blood pressure  Her last potassium was 3 2  Patient was advised to take oral potassium supplements which she did not  I encouraged her to go back for potassium check today  I will call her back with the results and let her know if she needs potassium supplements are not  Hypertension   This is a chronic problem  The current episode started more than 1 year ago  The problem has been gradually improving since onset  The problem is controlled  Pertinent negatives include no chest pain, headaches, neck pain, palpitations, peripheral edema, PND or shortness of breath  There are no associated agents to hypertension  Treatments tried: Amlodipine 10 mg, hctz 12 5 mg daily  The current treatment provides significant improvement  There are no compliance problems  Past Medical History:   Diagnosis Date    Hypertension        Family History   Problem Relation Age of Onset    Family history unknown: Yes       Past Surgical History:   Procedure Laterality Date    NO PAST SURGERIES      [place] COLONOSCOPY FLX DX W/COLLJ SPEC WHEN PFRMD N/A 2/2/2017    Procedure: COLONOSCOPY;  Surgeon: Chase Hollingsworth MD;  Location: [facility]; Service: Gastroenterology        reports that she has quit smoking  She has never used smokeless tobacco  She reports that she does not drink alcohol or use drugs        Current Outpatient Prescriptions:     amLODIPine (NORVASC) 10 mg tablet, Take 1 tablet (10 mg total) by mouth daily, Disp: 90 tablet, Rfl: 1    hydrochlorothiazide (MICROZIDE) 12 5 mg capsule, Take 1 capsule (12 5 mg total) by mouth daily, Disp: 90 capsule, Rfl: 1    The following portions of the patient's history were reviewed and updated as appropriate: allergies, current medications, past family history, past medical history, past social history, past surgical history and problem list     Review of Systems   Constitutional: Negative  Respiratory: Negative  Negative for shortness of breath  Cardiovascular: Negative  Negative for chest pain, palpitations and PND  Musculoskeletal: Negative for myalgias and neck pain  Neurological: Negative  Negative for headaches  Psychiatric/Behavioral: Negative  Objective:    /80   Pulse 82   Resp 18   Ht 5' 5" (1 651 m)   Wt 76 4 kg (168 lb 6 4 oz)   LMP  (LMP Unknown)   SpO2 95%   BMI 28 02 kg/m²      Physical Exam   Constitutional: She is oriented to person, place, and time  She appears well-developed and well-nourished  Cardiovascular: Normal rate, regular rhythm and normal heart sounds  Pulmonary/Chest: Effort normal and breath sounds normal    Abdominal: Soft  Bowel sounds are normal    Neurological: She is alert and oriented to person, place, and time     Psychiatric: Her behavior is normal  Judgment normal          Recent Results (from the past 1008 hour(s))   CBC and differential    Collection Time: 03/02/18  8:10 AM   Result Value Ref Range    WBC 6 76 4 31 - 10 16 Thousand/uL    RBC 4 02 3 81 - 5 12 Million/uL    Hemoglobin 12 6 11 5 - 15 4 g/dL    Hematocrit 38 1 34 8 - 46 1 %    MCV 95 82 - 98 fL    MCH 31 3 26 8 - 34 3 pg    MCHC 33 1 31 4 - 37 4 g/dL    RDW 12 6 11 6 - 15 1 %    MPV 12 1 8 9 - 12 7 fL    Platelets [phone] 814 Thousands/uL    nRBC 0 /100 WBCs    Neutrophils Relative 59 43 - 75 %    Lymphocytes Relative 32 14 - 44 %    Monocytes Relative 6 4 - 12 %    Eosinophils Relative 2 0 - 6 %    Basophils Relative 1 0 - 1 %    Neutrophils Absolute 3 97 1 85 - 7 62 Thousands/µL    Lymphocytes Absolute 2 19 0 60 - 4 47 Thousands/µL    Monocytes Absolute 0 38 0 17 - 1 22 Thousand/µL    Eosinophils Absolute 0 13 0 00 - 0 61 Thousand/µL    Basophils Absolute 0 05 0 00 - 0 10 Thousands/µL   Comprehensive metabolic panel    Collection Time: 03/02/18  8:10 AM   Result Value Ref Range    Sodium 140 136 - 145 mmol/L    Potassium 3 2 (L) 3 5 - 5 3 mmol/L    Chloride 103 100 - 108 mmol/L    CO2 31 21 - 32 mmol/L    Anion Gap 6 4 - 13 mmol/L    BUN 19 5 - 25 mg/dL    Creatinine 0 73 0 60 - 1 30 mg/dL    Glucose, Fasting 85 65 - 99 mg/dL    Calcium 8 5 8 3 - 10 1 mg/dL    AST 25 5 - 45 U/L    ALT 33 12 - 78 U/L    Alkaline Phosphatase 71 46 - 116 U/L    Total Protein 7 1 6 4 - 8 2 g/dL    Albumin 3 4 (L) 3 5 - 5 0 g/dL    Total Bilirubin 0 67 0 20 - 1 00 mg/dL    eGFR 81 ml/min/1 73sq m   Lipid panel    Collection Time: 03/02/18  8:10 AM   Result Value Ref Range    Cholesterol 180 50 - 200 mg/dL    Triglycerides 55 <=150 mg/dL    HDL, Direct 95 (H) 40 - 60 mg/dL    LDL Calculated 74 0 - 100 mg/dL       Assessment/Plan:    Patient's blood pressure is at goal with amlodipine 10 milligram, hydrochlorothiazide 12 5 milligram daily  She will  continue this combination till her six-month follow-up  Encouraged to go for potassium check since her most recent potassium was low  I will call her back with the results  Diagnoses and all orders for this visit:    Essential hypertension  -     hydrochlorothiazide (MICROZIDE) 12 5 mg capsule;  Take 1 capsule (12 5 mg total) by mouth daily    Hypokalemia    Microalbuminuria

## 2018-08-17 ENCOUNTER — APPOINTMENT (OUTPATIENT)
Dept: LAB | Facility: CLINIC | Age: 73
End: 2018-08-17
Payer: COMMERCIAL

## 2018-08-17 DIAGNOSIS — R80.9 MICROALBUMINURIA: ICD-10-CM

## 2018-08-17 DIAGNOSIS — E87.6 HYPOKALEMIA: ICD-10-CM

## 2018-08-17 DIAGNOSIS — F43.22 ADJUSTMENT DISORDER WITH ANXIETY: ICD-10-CM

## 2018-08-17 DIAGNOSIS — I10 ESSENTIAL HYPERTENSION: ICD-10-CM

## 2018-08-17 LAB
ALBUMIN SERPL BCP-MCNC: 3.4 G/DL (ref 3.5–5)
ALP SERPL-CCNC: 56 U/L (ref 46–116)
ALT SERPL W P-5'-P-CCNC: 36 U/L (ref 12–78)
ANION GAP SERPL CALCULATED.3IONS-SCNC: 7 MMOL/L (ref 4–13)
AST SERPL W P-5'-P-CCNC: 32 U/L (ref 5–45)
BASOPHILS # BLD AUTO: 0.05 THOUSANDS/ΜL (ref 0–0.1)
BASOPHILS NFR BLD AUTO: 1 % (ref 0–1)
BILIRUB SERPL-MCNC: 0.7 MG/DL (ref 0.2–1)
BUN SERPL-MCNC: 18 MG/DL (ref 5–25)
CALCIUM SERPL-MCNC: 8.9 MG/DL (ref 8.3–10.1)
CHLORIDE SERPL-SCNC: 106 MMOL/L (ref 100–108)
CO2 SERPL-SCNC: 27 MMOL/L (ref 21–32)
CREAT SERPL-MCNC: 0.8 MG/DL (ref 0.6–1.3)
CREAT UR-MCNC: 199 MG/DL
EOSINOPHIL # BLD AUTO: 0.13 THOUSAND/ΜL (ref 0–0.61)
EOSINOPHIL NFR BLD AUTO: 2 % (ref 0–6)
ERYTHROCYTE [DISTWIDTH] IN BLOOD BY AUTOMATED COUNT: 12.6 % (ref 11.6–15.1)
GFR SERPL CREATININE-BSD FRML MDRD: 73 ML/MIN/1.73SQ M
GLUCOSE P FAST SERPL-MCNC: 81 MG/DL (ref 65–99)
HCT VFR BLD AUTO: 40 % (ref 34.8–46.1)
HGB BLD-MCNC: 13.4 G/DL (ref 11.5–15.4)
IMM GRANULOCYTES # BLD AUTO: 0.04 THOUSAND/UL (ref 0–0.2)
IMM GRANULOCYTES NFR BLD AUTO: 1 % (ref 0–2)
LYMPHOCYTES # BLD AUTO: 2.27 THOUSANDS/ΜL (ref 0.6–4.47)
LYMPHOCYTES NFR BLD AUTO: 35 % (ref 14–44)
MCH RBC QN AUTO: 31.9 PG (ref 26.8–34.3)
MCHC RBC AUTO-ENTMCNC: 33.5 G/DL (ref 31.4–37.4)
MCV RBC AUTO: 95 FL (ref 82–98)
MICROALBUMIN UR-MCNC: 33.5 MG/L (ref 0–20)
MICROALBUMIN/CREAT 24H UR: 17 MG/G CREATININE (ref 0–30)
MONOCYTES # BLD AUTO: 0.45 THOUSAND/ΜL (ref 0.17–1.22)
MONOCYTES NFR BLD AUTO: 7 % (ref 4–12)
NEUTROPHILS # BLD AUTO: 3.57 THOUSANDS/ΜL (ref 1.85–7.62)
NEUTS SEG NFR BLD AUTO: 54 % (ref 43–75)
NRBC BLD AUTO-RTO: 0 /100 WBCS
PLATELET # BLD AUTO: 251 THOUSANDS/UL (ref 149–390)
PMV BLD AUTO: 12 FL (ref 8.9–12.7)
POTASSIUM SERPL-SCNC: 3.5 MMOL/L (ref 3.5–5.3)
PROT SERPL-MCNC: 6.8 G/DL (ref 6.4–8.2)
RBC # BLD AUTO: 4.2 MILLION/UL (ref 3.81–5.12)
SODIUM SERPL-SCNC: 140 MMOL/L (ref 136–145)
WBC # BLD AUTO: 6.51 THOUSAND/UL (ref 4.31–10.16)

## 2018-08-17 PROCEDURE — 85025 COMPLETE CBC W/AUTO DIFF WBC: CPT

## 2018-08-17 PROCEDURE — 36415 COLL VENOUS BLD VENIPUNCTURE: CPT

## 2018-08-17 PROCEDURE — 82570 ASSAY OF URINE CREATININE: CPT

## 2018-08-17 PROCEDURE — 80053 COMPREHEN METABOLIC PANEL: CPT

## 2018-08-17 PROCEDURE — 82043 UR ALBUMIN QUANTITATIVE: CPT

## 2018-09-06 ENCOUNTER — OFFICE VISIT (OUTPATIENT)
Dept: FAMILY MEDICINE CLINIC | Facility: CLINIC | Age: 73
End: 2018-09-06
Payer: COMMERCIAL

## 2018-09-06 VITALS
HEART RATE: 79 BPM | BODY MASS INDEX: 28.32 KG/M2 | OXYGEN SATURATION: 92 % | DIASTOLIC BLOOD PRESSURE: 82 MMHG | WEIGHT: 170 LBS | HEIGHT: 65 IN | SYSTOLIC BLOOD PRESSURE: 142 MMHG

## 2018-09-06 DIAGNOSIS — I10 ESSENTIAL HYPERTENSION: Primary | ICD-10-CM

## 2018-09-06 DIAGNOSIS — Z23 NEED FOR PNEUMOCOCCAL VACCINATION: ICD-10-CM

## 2018-09-06 PROCEDURE — 99213 OFFICE O/P EST LOW 20 MIN: CPT | Performed by: FAMILY MEDICINE

## 2018-09-06 PROCEDURE — 3008F BODY MASS INDEX DOCD: CPT | Performed by: FAMILY MEDICINE

## 2018-09-06 RX ORDER — HYDROCHLOROTHIAZIDE 12.5 MG/1
12.5 CAPSULE, GELATIN COATED ORAL DAILY
Qty: 90 CAPSULE | Refills: 1 | Status: SHIPPED | OUTPATIENT
Start: 2018-09-06 | End: 2018-10-04 | Stop reason: SDUPTHER

## 2018-09-06 RX ORDER — LOSARTAN POTASSIUM 50 MG/1
50 TABLET ORAL DAILY
Qty: 30 TABLET | Refills: 0 | Status: SHIPPED | OUTPATIENT
Start: 2018-09-06 | End: 2018-10-04 | Stop reason: SDUPTHER

## 2018-09-06 NOTE — PROGRESS NOTES
Subjective:      Patient ID: Edy Araiza is a 74 y o  female  Hypertension   This is a chronic problem  The current episode started more than 1 year ago  The problem is controlled  Pertinent negatives include no blurred vision, chest pain, headaches, palpitations, peripheral edema or shortness of breath  Treatments tried: amlodipine 10 mg, HCTZ 12 5 MG DAILY  The current treatment provides significant improvement  Patient is reporting swelling in both her ankles specially in the morning  Overall patient states that she does not have any specific concerns  Past Medical History:   Diagnosis Date    Hypertension        Family History   Problem Relation Age of Onset    Family history unknown: Yes       Past Surgical History:   Procedure Laterality Date    NO PAST SURGERIES      [place] COLONOSCOPY FLX DX W/COLLJ SPEC WHEN PFRMD N/A 2/2/2017    Procedure: COLONOSCOPY;  Surgeon: Annalee Paul MD;  Location: [facility]; Service: Gastroenterology        reports that she has quit smoking  She has never used smokeless tobacco  She reports that she does not drink alcohol or use drugs  Current Outpatient Prescriptions:     hydrochlorothiazide (MICROZIDE) 12 5 mg capsule, Take 1 capsule (12 5 mg total) by mouth daily, Disp: 90 capsule, Rfl: 1    losartan (COZAAR) 50 mg tablet, Take 1 tablet (50 mg total) by mouth daily, Disp: 30 tablet, Rfl: 0    The following portions of the patient's history were reviewed and updated as appropriate: allergies, current medications, past family history, past medical history, past social history, past surgical history and problem list     Review of Systems   Constitutional: Negative  Eyes: Negative for blurred vision  Respiratory: Negative  Negative for shortness of breath  Cardiovascular: Negative  Negative for chest pain and palpitations  Musculoskeletal: Negative for myalgias  Neurological: Negative  Negative for headaches  Psychiatric/Behavioral: Negative  Objective:    /82   Pulse 79   Ht 5' 5" (1 651 m)   Wt 77 1 kg (170 lb)   LMP  (LMP Unknown)   SpO2 92%   BMI 28 29 kg/m²      Physical Exam   Constitutional: She is oriented to person, place, and time  She appears well-developed and well-nourished  Cardiovascular: Normal rate, regular rhythm and normal heart sounds  Pulmonary/Chest: Effort normal and breath sounds normal    Abdominal: Soft  Bowel sounds are normal    Neurological: She is alert and oriented to person, place, and time     Psychiatric: Her behavior is normal  Judgment normal          Recent Results (from the past 1008 hour(s))   CBC and differential    Collection Time: 08/17/18  7:48 AM   Result Value Ref Range    WBC 6 51 4 31 - 10 16 Thousand/uL    RBC 4 20 3 81 - 5 12 Million/uL    Hemoglobin 13 4 11 5 - 15 4 g/dL    Hematocrit 40 0 34 8 - 46 1 %    MCV 95 82 - 98 fL    MCH 31 9 26 8 - 34 3 pg    MCHC 33 5 31 4 - 37 4 g/dL    RDW 12 6 11 6 - 15 1 %    MPV 12 0 8 9 - 12 7 fL    Platelets 616 413 - 822 Thousands/uL    nRBC 0 /100 WBCs    Neutrophils Relative 54 43 - 75 %    Immat GRANS % 1 0 - 2 %    Lymphocytes Relative 35 14 - 44 %    Monocytes Relative 7 4 - 12 %    Eosinophils Relative 2 0 - 6 %    Basophils Relative 1 0 - 1 %    Neutrophils Absolute 3 57 1 85 - 7 62 Thousands/µL    Immature Grans Absolute 0 04 0 00 - 0 20 Thousand/uL    Lymphocytes Absolute 2 27 0 60 - 4 47 Thousands/µL    Monocytes Absolute 0 45 0 17 - 1 22 Thousand/µL    Eosinophils Absolute 0 13 0 00 - 0 61 Thousand/µL    Basophils Absolute 0 05 0 00 - 0 10 Thousands/µL   Comprehensive metabolic panel    Collection Time: 08/17/18  7:48 AM   Result Value Ref Range    Sodium 140 136 - 145 mmol/L    Potassium 3 5 3 5 - 5 3 mmol/L    Chloride 106 100 - 108 mmol/L    CO2 27 21 - 32 mmol/L    ANION GAP 7 4 - 13 mmol/L    BUN 18 5 - 25 mg/dL    Creatinine 0 80 0 60 - 1 30 mg/dL    Glucose, Fasting 81 65 - 99 mg/dL    Calcium 8 9 8 3 - 10 1 mg/dL    AST 32 5 - 45 U/L    ALT 36 12 - 78 U/L    Alkaline Phosphatase 56 46 - 116 U/L    Total Protein 6 8 6 4 - 8 2 g/dL    Albumin 3 4 (L) 3 5 - 5 0 g/dL    Total Bilirubin 0 70 0 20 - 1 00 mg/dL    eGFR 73 ml/min/1 73sq m   Microalbumin / creatinine urine ratio    Collection Time: 08/17/18  7:48 AM   Result Value Ref Range    Creatinine, Ur 199 0 mg/dL    Microalbum  ,U,Random 33 5 (H) 0 0 - 20 0 mg/L    Microalb Creat Ratio 17 0 - 30 mg/g creatinine       Assessment/Plan:    Patient's blood pressure is borderline high and she is also reporting swelling in her feet which could be a side effect of the calcium channel blocker  I switched her over to losartan  Patient will continue hydrochlorothiazide  I will see her back after 4 weeks for blood pressure check and after 6 months with routine labs  Diagnoses and all orders for this visit:    Essential hypertension  -     losartan (COZAAR) 50 mg tablet; Take 1 tablet (50 mg total) by mouth daily  -     hydrochlorothiazide (MICROZIDE) 12 5 mg capsule;  Take 1 capsule (12 5 mg total) by mouth daily    Need for pneumococcal vaccination  -     Cancel: PNEUMOCOCCAL POLYSACCHARIDE VACCINE 23-VALENT =>1YO SQ IM

## 2018-10-04 ENCOUNTER — OFFICE VISIT (OUTPATIENT)
Dept: FAMILY MEDICINE CLINIC | Facility: CLINIC | Age: 73
End: 2018-10-04
Payer: COMMERCIAL

## 2018-10-04 VITALS
SYSTOLIC BLOOD PRESSURE: 142 MMHG | HEIGHT: 65 IN | WEIGHT: 170 LBS | OXYGEN SATURATION: 99 % | HEART RATE: 78 BPM | BODY MASS INDEX: 28.32 KG/M2 | DIASTOLIC BLOOD PRESSURE: 80 MMHG

## 2018-10-04 DIAGNOSIS — I10 ESSENTIAL HYPERTENSION: Primary | ICD-10-CM

## 2018-10-04 PROCEDURE — 1160F RVW MEDS BY RX/DR IN RCRD: CPT | Performed by: FAMILY MEDICINE

## 2018-10-04 PROCEDURE — 3725F SCREEN DEPRESSION PERFORMED: CPT | Performed by: FAMILY MEDICINE

## 2018-10-04 PROCEDURE — 4040F PNEUMOC VAC/ADMIN/RCVD: CPT | Performed by: FAMILY MEDICINE

## 2018-10-04 PROCEDURE — 99213 OFFICE O/P EST LOW 20 MIN: CPT | Performed by: FAMILY MEDICINE

## 2018-10-04 RX ORDER — HYDROCHLOROTHIAZIDE 12.5 MG/1
12.5 CAPSULE, GELATIN COATED ORAL DAILY
Qty: 90 CAPSULE | Refills: 1 | Status: SHIPPED | OUTPATIENT
Start: 2018-10-04 | End: 2019-04-11 | Stop reason: SDUPTHER

## 2018-10-04 RX ORDER — LOSARTAN POTASSIUM 50 MG/1
50 TABLET ORAL DAILY
Qty: 90 TABLET | Refills: 1 | Status: SHIPPED | OUTPATIENT
Start: 2018-10-04 | End: 2019-04-11 | Stop reason: SDUPTHER

## 2018-10-04 NOTE — PROGRESS NOTES
Subjective:      Patient ID: Beckie Dan is a 74 y o  female  Hypertension   This is a chronic problem  The current episode started more than 1 year ago  The problem has been gradually improving since onset  The problem is controlled  Pertinent negatives include no blurred vision, chest pain, headaches, palpitations, peripheral edema, PND or shortness of breath  Treatments tried: losartan 50 mg, hctz 12 5 mg daily  The current treatment provides significant improvement  There are no compliance problems  patient was complaining of swelling in both her feet when she was on amlodipine  Amlodipine was switched over to losartan which she has tolerated well  Patient states that the swelling in her feet has reduced now  Past Medical History:   Diagnosis Date    Hypertension        Family History   Problem Relation Age of Onset    Family history unknown: Yes       Past Surgical History:   Procedure Laterality Date    NO PAST SURGERIES      [place] COLONOSCOPY FLX DX W/COLLJ SPEC WHEN PFRMD N/A 2/2/2017    Procedure: COLONOSCOPY;  Surgeon: Xiomara Garrido MD;  Location: [facility]; Service: Gastroenterology        reports that she has quit smoking  She has never used smokeless tobacco  She reports that she does not drink alcohol or use drugs  Current Outpatient Prescriptions:     hydrochlorothiazide (MICROZIDE) 12 5 mg capsule, Take 1 capsule (12 5 mg total) by mouth daily, Disp: 90 capsule, Rfl: 1    losartan (COZAAR) 50 mg tablet, Take 1 tablet (50 mg total) by mouth daily, Disp: 90 tablet, Rfl: 1    The following portions of the patient's history were reviewed and updated as appropriate: allergies, current medications, past family history, past medical history, past social history, past surgical history and problem list     Review of Systems   Constitutional: Negative  Eyes: Negative for blurred vision  Respiratory: Negative  Negative for shortness of breath  Cardiovascular: Negative  Negative for chest pain, palpitations and PND  Musculoskeletal: Negative for myalgias  Neurological: Negative  Negative for headaches  Psychiatric/Behavioral: Negative  Objective:    /80   Pulse 78   Ht 5' 5" (1 651 m)   Wt 77 1 kg (170 lb)   LMP  (LMP Unknown)   SpO2 99%   BMI 28 29 kg/m²      Physical Exam   Constitutional: She is oriented to person, place, and time  She appears well-developed and well-nourished  Cardiovascular: Normal rate, regular rhythm and normal heart sounds  Pulmonary/Chest: Effort normal and breath sounds normal    Abdominal: Soft  Bowel sounds are normal    Neurological: She is alert and oriented to person, place, and time  Psychiatric: Her behavior is normal  Judgment normal          No results found for this or any previous visit (from the past 1008 hour(s))  Assessment/Plan:      Patient's blood pressure is stable on combination of losartan 50 milligram and hydrochlorothiazide 12 5 milligram   Patient is tolerating the medication without any side effects  Will continue same  Six-month follow-up with labs advised  Patient declines both flu and pneumonia vaccine  Diagnoses and all orders for this visit:    Essential hypertension  -     losartan (COZAAR) 50 mg tablet; Take 1 tablet (50 mg total) by mouth daily  -     hydrochlorothiazide (MICROZIDE) 12 5 mg capsule;  Take 1 capsule (12 5 mg total) by mouth daily    Other orders  -     Cancel: PNEUMOCOCCAL POLYSACCHARIDE VACCINE 23-VALENT =>3YO SQ IM  -     Cancel: influenza vaccine, [id number], high-dose, PF 0 5 mL, for patients 65 yr+ (FLUZONE HIGH-DOSE)

## 2019-02-15 ENCOUNTER — APPOINTMENT (OUTPATIENT)
Dept: LAB | Facility: CLINIC | Age: 74
End: 2019-02-15
Payer: COMMERCIAL

## 2019-02-15 DIAGNOSIS — I10 ESSENTIAL HYPERTENSION: ICD-10-CM

## 2019-02-15 LAB
ALBUMIN SERPL BCP-MCNC: 3.6 G/DL (ref 3.5–5)
ALP SERPL-CCNC: 62 U/L (ref 46–116)
ALT SERPL W P-5'-P-CCNC: 33 U/L (ref 12–78)
ANION GAP SERPL CALCULATED.3IONS-SCNC: 6 MMOL/L (ref 4–13)
AST SERPL W P-5'-P-CCNC: 27 U/L (ref 5–45)
BILIRUB SERPL-MCNC: 0.79 MG/DL (ref 0.2–1)
BUN SERPL-MCNC: 24 MG/DL (ref 5–25)
CALCIUM SERPL-MCNC: 8.7 MG/DL (ref 8.3–10.1)
CHLORIDE SERPL-SCNC: 106 MMOL/L (ref 100–108)
CO2 SERPL-SCNC: 27 MMOL/L (ref 21–32)
CREAT SERPL-MCNC: 0.76 MG/DL (ref 0.6–1.3)
GFR SERPL CREATININE-BSD FRML MDRD: 77 ML/MIN/1.73SQ M
GLUCOSE P FAST SERPL-MCNC: 78 MG/DL (ref 65–99)
POTASSIUM SERPL-SCNC: 3.8 MMOL/L (ref 3.5–5.3)
PROT SERPL-MCNC: 7.1 G/DL (ref 6.4–8.2)
SODIUM SERPL-SCNC: 139 MMOL/L (ref 136–145)

## 2019-02-15 PROCEDURE — 36415 COLL VENOUS BLD VENIPUNCTURE: CPT

## 2019-02-15 PROCEDURE — 80053 COMPREHEN METABOLIC PANEL: CPT

## 2019-04-08 ENCOUNTER — TELEPHONE (OUTPATIENT)
Dept: FAMILY MEDICINE CLINIC | Facility: CLINIC | Age: 74
End: 2019-04-08

## 2019-04-11 ENCOUNTER — OFFICE VISIT (OUTPATIENT)
Dept: FAMILY MEDICINE CLINIC | Facility: CLINIC | Age: 74
End: 2019-04-11
Payer: COMMERCIAL

## 2019-04-11 VITALS
WEIGHT: 173.2 LBS | HEART RATE: 51 BPM | BODY MASS INDEX: 28.86 KG/M2 | HEIGHT: 65 IN | OXYGEN SATURATION: 98 % | RESPIRATION RATE: 18 BRPM | DIASTOLIC BLOOD PRESSURE: 82 MMHG | SYSTOLIC BLOOD PRESSURE: 140 MMHG

## 2019-04-11 DIAGNOSIS — I10 ESSENTIAL HYPERTENSION: Primary | ICD-10-CM

## 2019-04-11 DIAGNOSIS — Z00.00 MEDICARE ANNUAL WELLNESS VISIT, SUBSEQUENT: ICD-10-CM

## 2019-04-11 PROBLEM — Z23 NEED FOR PNEUMOCOCCAL VACCINATION: Status: RESOLVED | Noted: 2018-09-06 | Resolved: 2019-04-11

## 2019-04-11 PROCEDURE — 99213 OFFICE O/P EST LOW 20 MIN: CPT | Performed by: FAMILY MEDICINE

## 2019-04-11 PROCEDURE — G0439 PPPS, SUBSEQ VISIT: HCPCS | Performed by: FAMILY MEDICINE

## 2019-04-11 RX ORDER — HYDROCHLOROTHIAZIDE 12.5 MG/1
12.5 CAPSULE, GELATIN COATED ORAL DAILY
Qty: 90 CAPSULE | Refills: 1 | Status: SHIPPED | OUTPATIENT
Start: 2019-04-11 | End: 2019-10-07 | Stop reason: SDUPTHER

## 2019-04-11 RX ORDER — LOSARTAN POTASSIUM 50 MG/1
50 TABLET ORAL DAILY
Qty: 90 TABLET | Refills: 1 | Status: SHIPPED | OUTPATIENT
Start: 2019-04-11 | End: 2019-10-07 | Stop reason: SDUPTHER

## 2019-09-27 ENCOUNTER — APPOINTMENT (OUTPATIENT)
Dept: LAB | Facility: CLINIC | Age: 74
End: 2019-09-27
Payer: COMMERCIAL

## 2019-09-27 DIAGNOSIS — I10 ESSENTIAL HYPERTENSION: ICD-10-CM

## 2019-09-27 DIAGNOSIS — Z00.00 MEDICARE ANNUAL WELLNESS VISIT, SUBSEQUENT: ICD-10-CM

## 2019-09-27 LAB
ALBUMIN SERPL BCP-MCNC: 3.1 G/DL (ref 3.5–5)
ALP SERPL-CCNC: 65 U/L (ref 46–116)
ALT SERPL W P-5'-P-CCNC: 33 U/L (ref 12–78)
ANION GAP SERPL CALCULATED.3IONS-SCNC: 2 MMOL/L (ref 4–13)
AST SERPL W P-5'-P-CCNC: 27 U/L (ref 5–45)
BILIRUB SERPL-MCNC: 0.6 MG/DL (ref 0.2–1)
BUN SERPL-MCNC: 20 MG/DL (ref 5–25)
CALCIUM SERPL-MCNC: 8.6 MG/DL (ref 8.3–10.1)
CHLORIDE SERPL-SCNC: 110 MMOL/L (ref 100–108)
CHOLEST SERPL-MCNC: 169 MG/DL (ref 50–200)
CO2 SERPL-SCNC: 31 MMOL/L (ref 21–32)
CREAT SERPL-MCNC: 0.73 MG/DL (ref 0.6–1.3)
CREAT UR-MCNC: 115 MG/DL
GFR SERPL CREATININE-BSD FRML MDRD: 81 ML/MIN/1.73SQ M
GLUCOSE P FAST SERPL-MCNC: 83 MG/DL (ref 65–99)
HDLC SERPL-MCNC: 85 MG/DL (ref 40–60)
LDLC SERPL CALC-MCNC: 71 MG/DL (ref 0–100)
MICROALBUMIN UR-MCNC: 23.7 MG/L (ref 0–20)
MICROALBUMIN/CREAT 24H UR: 21 MG/G CREATININE (ref 0–30)
NONHDLC SERPL-MCNC: 84 MG/DL
POTASSIUM SERPL-SCNC: 3.7 MMOL/L (ref 3.5–5.3)
PROT SERPL-MCNC: 7.2 G/DL (ref 6.4–8.2)
SODIUM SERPL-SCNC: 143 MMOL/L (ref 136–145)
TRIGL SERPL-MCNC: 63 MG/DL

## 2019-09-27 PROCEDURE — 82043 UR ALBUMIN QUANTITATIVE: CPT

## 2019-09-27 PROCEDURE — 82570 ASSAY OF URINE CREATININE: CPT

## 2019-09-27 PROCEDURE — 80053 COMPREHEN METABOLIC PANEL: CPT

## 2019-09-27 PROCEDURE — 36415 COLL VENOUS BLD VENIPUNCTURE: CPT

## 2019-09-27 PROCEDURE — 80061 LIPID PANEL: CPT

## 2019-10-07 DIAGNOSIS — I10 ESSENTIAL HYPERTENSION: ICD-10-CM

## 2019-10-07 RX ORDER — HYDROCHLOROTHIAZIDE 12.5 MG/1
CAPSULE, GELATIN COATED ORAL
Qty: 90 CAPSULE | Refills: 0 | OUTPATIENT
Start: 2019-10-07

## 2019-10-07 RX ORDER — LOSARTAN POTASSIUM 50 MG/1
50 TABLET ORAL DAILY
Qty: 30 TABLET | Refills: 0 | Status: SHIPPED | OUTPATIENT
Start: 2019-10-07 | End: 2019-10-10

## 2019-10-07 RX ORDER — HYDROCHLOROTHIAZIDE 12.5 MG/1
12.5 CAPSULE, GELATIN COATED ORAL DAILY
Qty: 30 CAPSULE | Refills: 0 | Status: SHIPPED | OUTPATIENT
Start: 2019-10-07 | End: 2019-10-10 | Stop reason: SDUPTHER

## 2019-10-08 RX ORDER — LOSARTAN POTASSIUM 50 MG/1
TABLET ORAL
Qty: 90 TABLET | Refills: 0 | OUTPATIENT
Start: 2019-10-08

## 2019-10-10 ENCOUNTER — OFFICE VISIT (OUTPATIENT)
Dept: FAMILY MEDICINE CLINIC | Facility: CLINIC | Age: 74
End: 2019-10-10
Payer: COMMERCIAL

## 2019-10-10 VITALS
DIASTOLIC BLOOD PRESSURE: 92 MMHG | WEIGHT: 177 LBS | BODY MASS INDEX: 29.49 KG/M2 | HEIGHT: 65 IN | HEART RATE: 74 BPM | OXYGEN SATURATION: 100 % | RESPIRATION RATE: 20 BRPM | SYSTOLIC BLOOD PRESSURE: 150 MMHG

## 2019-10-10 DIAGNOSIS — I10 ESSENTIAL HYPERTENSION: Primary | ICD-10-CM

## 2019-10-10 DIAGNOSIS — R80.9 MICROALBUMINURIA: ICD-10-CM

## 2019-10-10 PROCEDURE — 99214 OFFICE O/P EST MOD 30 MIN: CPT | Performed by: FAMILY MEDICINE

## 2019-10-10 RX ORDER — LOSARTAN POTASSIUM 100 MG/1
100 TABLET ORAL DAILY
Qty: 90 TABLET | Refills: 1
Start: 2019-10-10 | End: 2019-10-24

## 2019-10-10 RX ORDER — HYDROCHLOROTHIAZIDE 12.5 MG/1
12.5 CAPSULE, GELATIN COATED ORAL DAILY
Qty: 90 CAPSULE | Refills: 1
Start: 2019-10-10 | End: 2019-10-24 | Stop reason: SDUPTHER

## 2019-10-10 NOTE — ASSESSMENT & PLAN NOTE
Patient's blood pressure is not at goal   Advised to increase losartan to 100 milligram, continue hydrochlorothiazide 12 5 milligram   Suggested low-salt diet  Will follow up after 2 weeks to recheck blood pressure

## 2019-10-10 NOTE — PROGRESS NOTES
Subjective:      Patient ID: Joseline Henning is a 74 y o  female  Hypertension   This is a chronic problem  The current episode started more than 1 year ago  The problem is unchanged  The problem is uncontrolled  Pertinent negatives include no blurred vision, chest pain, headaches, palpitations, peripheral edema or shortness of breath  Risk factors for coronary artery disease include post-menopausal state  Treatments tried: Losartan 50, hctz 12 5 mg daily  The current treatment provides mild improvement  There are no compliance problems  Past Medical History:   Diagnosis Date    Hypertension        Family History   Family history unknown: Yes       Past Surgical History:   Procedure Laterality Date    NO PAST SURGERIES      [place] COLONOSCOPY FLX DX W/COLLJ SPEC WHEN PFRMD N/A 2/2/2017    Procedure: COLONOSCOPY;  Surgeon: Darrick Drummond MD;  Location: [facility]; Service: Gastroenterology        reports that she has quit smoking  She has never used smokeless tobacco  She reports that she does not drink alcohol or use drugs  Current Outpatient Medications:     hydrochlorothiazide (MICROZIDE) 12 5 mg capsule, Take 1 capsule (12 5 mg total) by mouth daily, Disp: 90 capsule, Rfl: 1    losartan (COZAAR) 100 MG tablet, Take 1 tablet (100 mg total) by mouth daily, Disp: 90 tablet, Rfl: 1    The following portions of the patient's history were reviewed and updated as appropriate: allergies, current medications, past family history, past medical history, past social history, past surgical history and problem list     Review of Systems   Constitutional: Negative  Eyes: Negative for blurred vision  Respiratory: Negative  Negative for shortness of breath  Cardiovascular: Negative  Negative for chest pain and palpitations  Musculoskeletal: Negative for myalgias  Neurological: Negative  Negative for headaches  Psychiatric/Behavioral: Negative              Objective:    /92 (BP Location: Left arm, Patient Position: Sitting, Cuff Size: Large)   Pulse 74   Resp 20   Ht 5' 5" (1 651 m)   Wt 80 3 kg (177 lb)   LMP  (LMP Unknown)   SpO2 100%   BMI 29 45 kg/m²      Physical Exam   Constitutional: She is oriented to person, place, and time  She appears well-developed and well-nourished  Cardiovascular: Normal rate, regular rhythm and normal heart sounds  Pulmonary/Chest: Effort normal and breath sounds normal    Abdominal: Soft  Bowel sounds are normal    Neurological: She is alert and oriented to person, place, and time  Psychiatric: Her behavior is normal  Judgment normal          Recent Results (from the past 1008 hour(s))   Comprehensive metabolic panel    Collection Time: 09/27/19  7:58 AM   Result Value Ref Range    Sodium 143 136 - 145 mmol/L    Potassium 3 7 3 5 - 5 3 mmol/L    Chloride 110 (H) 100 - 108 mmol/L    CO2 31 21 - 32 mmol/L    ANION GAP 2 (L) 4 - 13 mmol/L    BUN 20 5 - 25 mg/dL    Creatinine 0 73 0 60 - 1 30 mg/dL    Glucose, Fasting 83 65 - 99 mg/dL    Calcium 8 6 8 3 - 10 1 mg/dL    AST 27 5 - 45 U/L    ALT 33 12 - 78 U/L    Alkaline Phosphatase 65 46 - 116 U/L    Total Protein 7 2 6 4 - 8 2 g/dL    Albumin 3 1 (L) 3 5 - 5 0 g/dL    Total Bilirubin 0 60 0 20 - 1 00 mg/dL    eGFR 81 ml/min/1 73sq m   Lipid panel    Collection Time: 09/27/19  7:58 AM   Result Value Ref Range    Cholesterol 169 50 - 200 mg/dL    Triglycerides 63 <=150 mg/dL    HDL, Direct 85 (H) 40 - 60 mg/dL    LDL Calculated 71 0 - 100 mg/dL    Non-HDL-Chol (CHOL-HDL) 84 mg/dl   Microalbumin / creatinine urine ratio    Collection Time: 09/27/19  7:58 AM   Result Value Ref Range    Creatinine, Ur 115 0 mg/dL    Microalbum  ,U,Random 23 7 (H) 0 0 - 20 0 mg/L    Microalb Creat Ratio 21 0 - 30 mg/g creatinine       Assessment/Plan:         Problem List Items Addressed This Visit        Cardiovascular and Mediastinum    Essential hypertension - Primary     Patient's blood pressure is not at goal   Advised to increase losartan to 100 milligram, continue hydrochlorothiazide 12 5 milligram   Suggested low-salt diet  Will follow up after 2 weeks to recheck blood pressure  Relevant Medications    losartan (COZAAR) 100 MG tablet    hydrochlorothiazide (MICROZIDE) 12 5 mg capsule    Other Relevant Orders    Comprehensive metabolic panel       Other    Microalbuminuria     Improved on losartan  Continue with annual monitoring  BMI Counseling: Body mass index is 29 45 kg/m²  The BMI is above normal  Nutrition recommendations include reducing portion sizes, decreasing overall calorie intake, 3-5 servings of fruits/vegetables daily, reducing fast food intake, consuming healthier snacks, decreasing soda and/or juice intake, moderation in carbohydrate intake, increasing intake of lean protein, reducing intake of saturated fat and trans fat and reducing intake of cholesterol  Exercise recommendations include moderate aerobic physical activity for 150 minutes/week

## 2019-10-24 ENCOUNTER — OFFICE VISIT (OUTPATIENT)
Dept: FAMILY MEDICINE CLINIC | Facility: CLINIC | Age: 74
End: 2019-10-24
Payer: COMMERCIAL

## 2019-10-24 VITALS
RESPIRATION RATE: 16 BRPM | HEART RATE: 67 BPM | SYSTOLIC BLOOD PRESSURE: 142 MMHG | HEIGHT: 65 IN | WEIGHT: 177 LBS | DIASTOLIC BLOOD PRESSURE: 78 MMHG | BODY MASS INDEX: 29.49 KG/M2 | OXYGEN SATURATION: 97 %

## 2019-10-24 DIAGNOSIS — R80.9 MICROALBUMINURIA: ICD-10-CM

## 2019-10-24 DIAGNOSIS — Z23 NEED FOR PROPHYLACTIC VACCINATION AND INOCULATION AGAINST INFLUENZA: ICD-10-CM

## 2019-10-24 DIAGNOSIS — I10 ESSENTIAL HYPERTENSION: Primary | ICD-10-CM

## 2019-10-24 PROCEDURE — 90732 PPSV23 VACC 2 YRS+ SUBQ/IM: CPT | Performed by: FAMILY MEDICINE

## 2019-10-24 PROCEDURE — 99214 OFFICE O/P EST MOD 30 MIN: CPT | Performed by: FAMILY MEDICINE

## 2019-10-24 PROCEDURE — G0009 ADMIN PNEUMOCOCCAL VACCINE: HCPCS | Performed by: FAMILY MEDICINE

## 2019-10-24 PROCEDURE — G0008 ADMIN INFLUENZA VIRUS VAC: HCPCS | Performed by: FAMILY MEDICINE

## 2019-10-24 PROCEDURE — 90662 IIV NO PRSV INCREASED AG IM: CPT | Performed by: FAMILY MEDICINE

## 2019-10-24 RX ORDER — HYDROCHLOROTHIAZIDE 12.5 MG/1
12.5 CAPSULE, GELATIN COATED ORAL DAILY
Qty: 90 CAPSULE | Refills: 1
Start: 2019-10-24 | End: 2020-04-23 | Stop reason: SDUPTHER

## 2019-10-24 RX ORDER — LOSARTAN POTASSIUM 50 MG/1
50 TABLET ORAL DAILY
Qty: 90 TABLET | Refills: 1 | Status: SHIPPED | OUTPATIENT
Start: 2019-10-24 | End: 2020-04-23 | Stop reason: SDUPTHER

## 2019-10-24 NOTE — ASSESSMENT & PLAN NOTE
Blood pressure is adequately controlled on losartan 50 milligram, hydrochlorothiazide 12  Patient is reporting nasal bleeds when she increase losartan to 100 milligrams  Suggested to continue with diet modifications and regular exercise  Low salt intake advised

## 2019-10-24 NOTE — PROGRESS NOTES
Subjective:      Patient ID: Se Johnson is a 74 y o  female  Hypertension   This is a chronic problem  The current episode started more than 1 year ago  The problem is controlled  Pertinent negatives include no blurred vision, chest pain, headaches, palpitations, peripheral edema or shortness of breath  Risk factors for coronary artery disease include post-menopausal state  Treatments tried: Losartan 50 mg, hctz 12 5 mg daily  The current treatment provides significant improvement  There are no compliance problems  Patient experience nosebleeds when she increased her losartan to 100 milligram   Patient has been careful with her intake salt  Blood pressure today is 50 milligram losartan and hydrochlorothiazide 12 5 milligram   The      Past Medical History:   Diagnosis Date    Hypertension        Family History   Family history unknown: Yes       Past Surgical History:   Procedure Laterality Date    NO PAST SURGERIES      [place] COLONOSCOPY FLX DX W/COLLJ SPEC WHEN PFRMD N/A 2/2/2017    Procedure: COLONOSCOPY;  Surgeon: Anand Wang MD;  [facility]; Service: Gastroenterology        reports that she has quit smoking  She has never used smokeless tobacco  She reports that she does not drink alcohol or use drugs  Current Outpatient Medications:     hydrochlorothiazide (MICROZIDE) 12 5 mg capsule, Take 1 capsule (12 5 mg total) by mouth daily, Disp: 90 capsule, Rfl: 1    losartan (COZAAR) 50 mg tablet, Take 1 tablet (50 mg total) by mouth daily, Disp: 90 tablet, Rfl: 1    The following portions of the patient's history were reviewed and updated as appropriate: allergies, current medications, past family history, past medical history, past social history, past surgical history and problem list     Review of Systems   Constitutional: Negative  Eyes: Negative for blurred vision  Respiratory: Negative  Negative for shortness of breath  Cardiovascular: Negative    Negative for chest pain and palpitations  Musculoskeletal: Negative for myalgias  Neurological: Negative  Negative for headaches  Psychiatric/Behavioral: Negative  Objective:    /78 (BP Location: Right arm, Patient Position: Sitting, Cuff Size: Large)   Pulse 67   Resp 16   Ht 5' 5" (1 651 m)   Wt 80 3 kg (177 lb)   LMP  (LMP Unknown)   SpO2 97%   BMI 29 45 kg/m²      Physical Exam   Constitutional: She is oriented to person, place, and time  She appears well-developed and well-nourished  Cardiovascular: Normal rate, regular rhythm and normal heart sounds  Pulmonary/Chest: Effort normal and breath sounds normal    Abdominal: Soft  Bowel sounds are normal    Neurological: She is alert and oriented to person, place, and time  Psychiatric: Her behavior is normal  Judgment normal          Recent Results (from the past 1008 hour(s))   Comprehensive metabolic panel    Collection Time: 09/27/19  7:58 AM   Result Value Ref Range    Sodium 143 136 - 145 mmol/L    Potassium 3 7 3 5 - 5 3 mmol/L    Chloride 110 (H) 100 - 108 mmol/L    CO2 31 21 - 32 mmol/L    ANION GAP 2 (L) 4 - 13 mmol/L    BUN 20 5 - 25 mg/dL    Creatinine 0 73 0 60 - 1 30 mg/dL    Glucose, Fasting 83 65 - 99 mg/dL    Calcium 8 6 8 3 - 10 1 mg/dL    AST 27 5 - 45 U/L    ALT 33 12 - 78 U/L    Alkaline Phosphatase 65 46 - 116 U/L    Total Protein 7 2 6 4 - 8 2 g/dL    Albumin 3 1 (L) 3 5 - 5 0 g/dL    Total Bilirubin 0 60 0 20 - 1 00 mg/dL    eGFR 81 ml/min/1 73sq m   Lipid panel    Collection Time: 09/27/19  7:58 AM   Result Value Ref Range    Cholesterol 169 50 - 200 mg/dL    Triglycerides 63 <=150 mg/dL    HDL, Direct 85 (H) 40 - 60 mg/dL    LDL Calculated 71 0 - 100 mg/dL    Non-HDL-Chol (CHOL-HDL) 84 mg/dl   Microalbumin / creatinine urine ratio    Collection Time: 09/27/19  7:58 AM   Result Value Ref Range    Creatinine, Ur 115 0 mg/dL    Microalbum  ,U,Random 23 7 (H) 0 0 - 20 0 mg/L    Microalb Creat Ratio 21 0 - 30 mg/g creatinine Assessment/Plan:         Problem List Items Addressed This Visit        Cardiovascular and Mediastinum    Essential hypertension - Primary     Blood pressure is adequately controlled on losartan 50 milligram, hydrochlorothiazide 12  Patient is reporting nasal bleeds when she increase losartan to 100 milligrams  Suggested to continue with diet modifications and regular exercise  Low salt intake advised           Relevant Medications    losartan (COZAAR) 50 mg tablet    hydrochlorothiazide (MICROZIDE) 12 5 mg capsule       Other    Microalbuminuria    Need for prophylactic vaccination and inoculation against influenza    Relevant Orders    PNEUMOCOCCAL POLYSACCHARIDE VACCINE 23-VALENT =>1YO SQ IM    influenza vaccine, 8945-8087, high-dose, PF 0 5 mL (FLUZONE HIGH-DOSE)

## 2020-03-13 ENCOUNTER — APPOINTMENT (OUTPATIENT)
Dept: LAB | Facility: CLINIC | Age: 75
End: 2020-03-13
Payer: COMMERCIAL

## 2020-03-13 DIAGNOSIS — I10 ESSENTIAL HYPERTENSION: ICD-10-CM

## 2020-03-13 LAB
ALBUMIN SERPL BCP-MCNC: 3.1 G/DL (ref 3.5–5)
ALP SERPL-CCNC: 67 U/L (ref 46–116)
ALT SERPL W P-5'-P-CCNC: 25 U/L (ref 12–78)
ANION GAP SERPL CALCULATED.3IONS-SCNC: 5 MMOL/L (ref 4–13)
AST SERPL W P-5'-P-CCNC: 18 U/L (ref 5–45)
BILIRUB SERPL-MCNC: 0.65 MG/DL (ref 0.2–1)
BUN SERPL-MCNC: 17 MG/DL (ref 5–25)
CALCIUM SERPL-MCNC: 8.6 MG/DL (ref 8.3–10.1)
CHLORIDE SERPL-SCNC: 109 MMOL/L (ref 100–108)
CO2 SERPL-SCNC: 29 MMOL/L (ref 21–32)
CREAT SERPL-MCNC: 0.74 MG/DL (ref 0.6–1.3)
GFR SERPL CREATININE-BSD FRML MDRD: 79 ML/MIN/1.73SQ M
GLUCOSE P FAST SERPL-MCNC: 86 MG/DL (ref 65–99)
POTASSIUM SERPL-SCNC: 3.5 MMOL/L (ref 3.5–5.3)
PROT SERPL-MCNC: 7.7 G/DL (ref 6.4–8.2)
SODIUM SERPL-SCNC: 143 MMOL/L (ref 136–145)

## 2020-03-13 PROCEDURE — 80053 COMPREHEN METABOLIC PANEL: CPT

## 2020-03-13 PROCEDURE — 36415 COLL VENOUS BLD VENIPUNCTURE: CPT

## 2020-04-22 DIAGNOSIS — I10 ESSENTIAL HYPERTENSION: ICD-10-CM

## 2020-04-23 ENCOUNTER — TELEMEDICINE (OUTPATIENT)
Dept: FAMILY MEDICINE CLINIC | Facility: CLINIC | Age: 75
End: 2020-04-23
Payer: COMMERCIAL

## 2020-04-23 DIAGNOSIS — I10 ESSENTIAL HYPERTENSION: ICD-10-CM

## 2020-04-23 DIAGNOSIS — Z00.00 MEDICARE ANNUAL WELLNESS VISIT, SUBSEQUENT: Primary | ICD-10-CM

## 2020-04-23 DIAGNOSIS — R80.9 MICROALBUMINURIA: ICD-10-CM

## 2020-04-23 PROCEDURE — 4040F PNEUMOC VAC/ADMIN/RCVD: CPT | Performed by: FAMILY MEDICINE

## 2020-04-23 PROCEDURE — 1036F TOBACCO NON-USER: CPT | Performed by: FAMILY MEDICINE

## 2020-04-23 PROCEDURE — 99213 OFFICE O/P EST LOW 20 MIN: CPT | Performed by: FAMILY MEDICINE

## 2020-04-23 PROCEDURE — G0439 PPPS, SUBSEQ VISIT: HCPCS | Performed by: FAMILY MEDICINE

## 2020-04-23 PROCEDURE — 3077F SYST BP >= 140 MM HG: CPT | Performed by: FAMILY MEDICINE

## 2020-04-23 PROCEDURE — 1125F AMNT PAIN NOTED PAIN PRSNT: CPT | Performed by: FAMILY MEDICINE

## 2020-04-23 PROCEDURE — 1170F FXNL STATUS ASSESSED: CPT | Performed by: FAMILY MEDICINE

## 2020-04-23 PROCEDURE — 1160F RVW MEDS BY RX/DR IN RCRD: CPT | Performed by: FAMILY MEDICINE

## 2020-04-23 PROCEDURE — 3078F DIAST BP <80 MM HG: CPT | Performed by: FAMILY MEDICINE

## 2020-04-23 RX ORDER — LOSARTAN POTASSIUM 50 MG/1
TABLET ORAL
Qty: 90 TABLET | Refills: 1 | OUTPATIENT
Start: 2020-04-23

## 2020-04-23 RX ORDER — HYDROCHLOROTHIAZIDE 12.5 MG/1
12.5 CAPSULE, GELATIN COATED ORAL DAILY
Qty: 90 CAPSULE | Refills: 0 | Status: SHIPPED | OUTPATIENT
Start: 2020-04-23 | End: 2020-07-16 | Stop reason: SDUPTHER

## 2020-04-23 RX ORDER — LOSARTAN POTASSIUM 50 MG/1
50 TABLET ORAL DAILY
Qty: 90 TABLET | Refills: 0 | Status: SHIPPED | OUTPATIENT
Start: 2020-04-23 | End: 2020-07-16 | Stop reason: SDUPTHER

## 2020-04-23 RX ORDER — HYDROCHLOROTHIAZIDE 12.5 MG/1
CAPSULE, GELATIN COATED ORAL
Qty: 90 CAPSULE | Refills: 1 | OUTPATIENT
Start: 2020-04-23

## 2020-04-23 RX ORDER — HYDROCHLOROTHIAZIDE 12.5 MG/1
12.5 CAPSULE, GELATIN COATED ORAL DAILY
Qty: 90 CAPSULE | Refills: 0
Start: 2020-04-23 | End: 2020-04-23 | Stop reason: SDUPTHER

## 2020-07-16 ENCOUNTER — OFFICE VISIT (OUTPATIENT)
Dept: FAMILY MEDICINE CLINIC | Facility: CLINIC | Age: 75
End: 2020-07-16
Payer: COMMERCIAL

## 2020-07-16 VITALS
BODY MASS INDEX: 30.66 KG/M2 | HEIGHT: 65 IN | OXYGEN SATURATION: 98 % | DIASTOLIC BLOOD PRESSURE: 88 MMHG | TEMPERATURE: 98.4 F | SYSTOLIC BLOOD PRESSURE: 140 MMHG | HEART RATE: 90 BPM | WEIGHT: 184 LBS

## 2020-07-16 DIAGNOSIS — I10 ESSENTIAL HYPERTENSION: Primary | ICD-10-CM

## 2020-07-16 DIAGNOSIS — R80.9 MICROALBUMINURIA: ICD-10-CM

## 2020-07-16 PROCEDURE — 3079F DIAST BP 80-89 MM HG: CPT | Performed by: FAMILY MEDICINE

## 2020-07-16 PROCEDURE — 3077F SYST BP >= 140 MM HG: CPT | Performed by: FAMILY MEDICINE

## 2020-07-16 PROCEDURE — 99213 OFFICE O/P EST LOW 20 MIN: CPT | Performed by: FAMILY MEDICINE

## 2020-07-16 PROCEDURE — 1160F RVW MEDS BY RX/DR IN RCRD: CPT | Performed by: FAMILY MEDICINE

## 2020-07-16 PROCEDURE — 3008F BODY MASS INDEX DOCD: CPT | Performed by: FAMILY MEDICINE

## 2020-07-16 PROCEDURE — 1036F TOBACCO NON-USER: CPT | Performed by: FAMILY MEDICINE

## 2020-07-16 PROCEDURE — 4040F PNEUMOC VAC/ADMIN/RCVD: CPT | Performed by: FAMILY MEDICINE

## 2020-07-16 RX ORDER — LOSARTAN POTASSIUM 50 MG/1
50 TABLET ORAL DAILY
Qty: 90 TABLET | Refills: 1 | Status: SHIPPED | OUTPATIENT
Start: 2020-07-16 | End: 2021-01-14

## 2020-07-16 RX ORDER — AMLODIPINE BESYLATE 10 MG/1
10 TABLET ORAL DAILY
Qty: 90 TABLET | Refills: 1 | Status: SHIPPED | OUTPATIENT
Start: 2020-07-16 | End: 2020-07-16

## 2020-07-16 RX ORDER — POTASSIUM CHLORIDE 750 MG/1
CAPSULE, EXTENDED RELEASE ORAL
COMMUNITY
Start: 2016-12-28 | End: 2020-07-16

## 2020-07-16 RX ORDER — HYDROCHLOROTHIAZIDE 12.5 MG/1
12.5 CAPSULE, GELATIN COATED ORAL DAILY
Qty: 90 CAPSULE | Refills: 1 | Status: SHIPPED | OUTPATIENT
Start: 2020-07-16 | End: 2021-01-14

## 2020-07-16 RX ORDER — ALPRAZOLAM 0.25 MG/1
TABLET, ORALLY DISINTEGRATING ORAL
COMMUNITY
Start: 2016-12-12 | End: 2021-01-28 | Stop reason: ALTCHOICE

## 2020-07-16 RX ORDER — AMLODIPINE BESYLATE 10 MG/1
TABLET ORAL
COMMUNITY
Start: 2016-12-12 | End: 2020-07-16 | Stop reason: SDUPTHER

## 2020-07-16 NOTE — ASSESSMENT & PLAN NOTE
BMI Counseling: There is no height or weight on file to calculate BMI  The BMI is above normal  Nutrition recommendations include reducing portion sizes, decreasing overall calorie intake, 3-5 servings of fruits/vegetables daily and reducing fast food intake  Exercise recommendations include moderate aerobic physical activity for 150 minutes/week

## 2020-07-16 NOTE — PROGRESS NOTES
Subjective:      Patient ID: Thomas Menezes is a 66 y o  female  Hypertension   This is a chronic problem  The current episode started more than 1 year ago  The problem is controlled  Pertinent negatives include no chest pain, headaches, palpitations, peripheral edema, PND or shortness of breath  Risk factors for coronary artery disease include post-menopausal state  Treatments tried:  hydrochlorothiazide 12 5 milligram, losartan 50 milligram  The current treatment provides significant improvement  There are no compliance problems  Past Medical History:   Diagnosis Date    Hypertension        Family History   Family history unknown: Yes       Past Surgical History:   Procedure Laterality Date    NO PAST SURGERIES      [place] COLONOSCOPY FLX DX W/COLLJ SPEC WHEN PFRMD N/A 2/2/2017    Procedure: COLONOSCOPY;  Surgeon: Cherelle Camacho MD;  [facility]; Service: Gastroenterology        reports that she has quit smoking  She has never used smokeless tobacco  She reports that she does not drink alcohol or use drugs  Current Outpatient Medications:     ALPRAZolam (NIRAVAM) 0 25 MG dissolvable tablet, Take by mouth, Disp: , Rfl:     amLODIPine (NORVASC) 10 mg tablet, Take 1 tablet (10 mg total) by mouth daily, Disp: 90 tablet, Rfl: 1    hydrochlorothiazide (MICROZIDE) 12 5 mg capsule, Take 1 capsule (12 5 mg total) by mouth daily, Disp: 90 capsule, Rfl: 1    losartan (COZAAR) 50 mg tablet, Take 1 tablet (50 mg total) by mouth daily, Disp: 90 tablet, Rfl: 1    The following portions of the patient's history were reviewed and updated as appropriate: allergies, current medications, past family history, past medical history, past social history, past surgical history and problem list     Review of Systems   Constitutional: Negative  Eyes: Negative  Respiratory: Negative  Negative for shortness of breath  Cardiovascular: Negative  Negative for chest pain, palpitations and PND  Gastrointestinal: Negative  Endocrine: Negative  Genitourinary: Negative  Musculoskeletal: Negative  Negative for myalgias  Neurological: Negative  Negative for headaches  Psychiatric/Behavioral: Negative  Objective:    /88   Pulse 90   Temp 98 4 °F (36 9 °C)   Ht 5' 5" (1 651 m)   Wt 83 5 kg (184 lb)   LMP  (LMP Unknown)   SpO2 98%   BMI 30 62 kg/m²      Physical Exam   Constitutional: She is oriented to person, place, and time  She appears well-developed and well-nourished  Cardiovascular: Normal rate, regular rhythm and normal heart sounds  Pulmonary/Chest: Effort normal and breath sounds normal    Abdominal: Soft  Bowel sounds are normal    Neurological: She is alert and oriented to person, place, and time  Psychiatric: Her behavior is normal  Judgment normal          No results found for this or any previous visit (from the past 1008 hour(s))  Assessment/Plan:    Patient's blood pressure is stable on hydrochlorothiazide 12 5 milligram, losartan 50 milligram   Continue same  BMI Counseling: Body mass index is 30 62 kg/m²  The BMI is above normal  Nutrition recommendations include reducing portion sizes, decreasing overall calorie intake, 3-5 servings of fruits/vegetables daily, reducing fast food intake, consuming healthier snacks, decreasing soda and/or juice intake, moderation in carbohydrate intake, increasing intake of lean protein, reducing intake of saturated fat and trans fat and reducing intake of cholesterol  Exercise recommendations include moderate aerobic physical activity for 150 minutes/week

## 2020-07-16 NOTE — ASSESSMENT & PLAN NOTE
Stable on hydrochlorothiazide 12 5 milligram, losartan 50 milligram   Continue same  Metabolic labs at 6 month interval and follow up thereafter

## 2020-12-04 ENCOUNTER — LAB (OUTPATIENT)
Dept: LAB | Facility: CLINIC | Age: 75
End: 2020-12-04
Payer: COMMERCIAL

## 2020-12-04 ENCOUNTER — TRANSCRIBE ORDERS (OUTPATIENT)
Dept: LAB | Facility: CLINIC | Age: 75
End: 2020-12-04

## 2020-12-04 DIAGNOSIS — I10 ESSENTIAL HYPERTENSION: ICD-10-CM

## 2020-12-04 LAB
ALBUMIN SERPL BCP-MCNC: 2.8 G/DL (ref 3.5–5)
ALP SERPL-CCNC: 69 U/L (ref 46–116)
ALT SERPL W P-5'-P-CCNC: 28 U/L (ref 12–78)
ANION GAP SERPL CALCULATED.3IONS-SCNC: 3 MMOL/L (ref 4–13)
AST SERPL W P-5'-P-CCNC: 20 U/L (ref 5–45)
BILIRUB SERPL-MCNC: 0.59 MG/DL (ref 0.2–1)
BUN SERPL-MCNC: 21 MG/DL (ref 5–25)
CALCIUM ALBUM COR SERPL-MCNC: 9.8 MG/DL (ref 8.3–10.1)
CALCIUM SERPL-MCNC: 8.8 MG/DL (ref 8.3–10.1)
CHLORIDE SERPL-SCNC: 109 MMOL/L (ref 100–108)
CO2 SERPL-SCNC: 30 MMOL/L (ref 21–32)
CREAT SERPL-MCNC: 0.8 MG/DL (ref 0.6–1.3)
GFR SERPL CREATININE-BSD FRML MDRD: 72 ML/MIN/1.73SQ M
GLUCOSE P FAST SERPL-MCNC: 85 MG/DL (ref 65–99)
POTASSIUM SERPL-SCNC: 3.5 MMOL/L (ref 3.5–5.3)
PROT SERPL-MCNC: 8.8 G/DL (ref 6.4–8.2)
SODIUM SERPL-SCNC: 142 MMOL/L (ref 136–145)

## 2020-12-04 PROCEDURE — 36415 COLL VENOUS BLD VENIPUNCTURE: CPT

## 2020-12-04 PROCEDURE — 80053 COMPREHEN METABOLIC PANEL: CPT

## 2021-01-01 ENCOUNTER — TELEPHONE (OUTPATIENT)
Dept: FAMILY MEDICINE CLINIC | Facility: CLINIC | Age: 76
End: 2021-01-01

## 2021-01-01 ENCOUNTER — APPOINTMENT (OUTPATIENT)
Dept: LAB | Facility: CLINIC | Age: 76
End: 2021-01-01
Payer: COMMERCIAL

## 2021-01-01 ENCOUNTER — RA CDI HCC (OUTPATIENT)
Dept: OTHER | Facility: HOSPITAL | Age: 76
End: 2021-01-01

## 2021-01-01 ENCOUNTER — OFFICE VISIT (OUTPATIENT)
Dept: FAMILY MEDICINE CLINIC | Facility: CLINIC | Age: 76
End: 2021-01-01
Payer: COMMERCIAL

## 2021-01-01 VITALS
SYSTOLIC BLOOD PRESSURE: 150 MMHG | RESPIRATION RATE: 16 BRPM | TEMPERATURE: 97.9 F | OXYGEN SATURATION: 98 % | HEIGHT: 65 IN | DIASTOLIC BLOOD PRESSURE: 90 MMHG | WEIGHT: 170 LBS | BODY MASS INDEX: 28.32 KG/M2 | HEART RATE: 66 BPM

## 2021-01-01 VITALS
WEIGHT: 171 LBS | DIASTOLIC BLOOD PRESSURE: 90 MMHG | SYSTOLIC BLOOD PRESSURE: 140 MMHG | RESPIRATION RATE: 16 BRPM | TEMPERATURE: 98.1 F | HEART RATE: 73 BPM | OXYGEN SATURATION: 93 % | HEIGHT: 65 IN | BODY MASS INDEX: 28.49 KG/M2

## 2021-01-01 VITALS
RESPIRATION RATE: 16 BRPM | BODY MASS INDEX: 28.82 KG/M2 | WEIGHT: 173 LBS | DIASTOLIC BLOOD PRESSURE: 96 MMHG | HEART RATE: 79 BPM | HEIGHT: 65 IN | SYSTOLIC BLOOD PRESSURE: 150 MMHG | TEMPERATURE: 98.7 F

## 2021-01-01 VITALS
RESPIRATION RATE: 16 BRPM | HEIGHT: 65 IN | WEIGHT: 164 LBS | HEART RATE: 76 BPM | DIASTOLIC BLOOD PRESSURE: 80 MMHG | OXYGEN SATURATION: 93 % | SYSTOLIC BLOOD PRESSURE: 130 MMHG | BODY MASS INDEX: 27.32 KG/M2

## 2021-01-01 DIAGNOSIS — I10 ESSENTIAL HYPERTENSION: Primary | ICD-10-CM

## 2021-01-01 DIAGNOSIS — Z00.00 MEDICARE ANNUAL WELLNESS VISIT, INITIAL: ICD-10-CM

## 2021-01-01 DIAGNOSIS — I10 ESSENTIAL HYPERTENSION: ICD-10-CM

## 2021-01-01 DIAGNOSIS — E78.2 MIXED HYPERLIPIDEMIA: ICD-10-CM

## 2021-01-01 DIAGNOSIS — N18.31 STAGE 3A CHRONIC KIDNEY DISEASE (HCC): Primary | ICD-10-CM

## 2021-01-01 LAB
ALBUMIN SERPL BCP-MCNC: 2.8 G/DL (ref 3.5–5)
ALP SERPL-CCNC: 66 U/L (ref 46–116)
ALT SERPL W P-5'-P-CCNC: 54 U/L (ref 12–78)
ANION GAP SERPL CALCULATED.3IONS-SCNC: 3 MMOL/L (ref 4–13)
AST SERPL W P-5'-P-CCNC: 33 U/L (ref 5–45)
BILIRUB SERPL-MCNC: 0.58 MG/DL (ref 0.2–1)
BUN SERPL-MCNC: 33 MG/DL (ref 5–25)
CALCIUM ALBUM COR SERPL-MCNC: 10.4 MG/DL (ref 8.3–10.1)
CALCIUM SERPL-MCNC: 9.4 MG/DL (ref 8.3–10.1)
CHLORIDE SERPL-SCNC: 107 MMOL/L (ref 100–108)
CHOLEST SERPL-MCNC: 153 MG/DL (ref 50–200)
CO2 SERPL-SCNC: 28 MMOL/L (ref 21–32)
CREAT SERPL-MCNC: 0.93 MG/DL (ref 0.6–1.3)
GFR SERPL CREATININE-BSD FRML MDRD: 59 ML/MIN/1.73SQ M
GLUCOSE P FAST SERPL-MCNC: 86 MG/DL (ref 65–99)
HDLC SERPL-MCNC: 77 MG/DL
LDLC SERPL CALC-MCNC: 65 MG/DL (ref 0–100)
NONHDLC SERPL-MCNC: 76 MG/DL
POTASSIUM SERPL-SCNC: 3.8 MMOL/L (ref 3.5–5.3)
PROT SERPL-MCNC: 9.8 G/DL (ref 6.4–8.2)
SODIUM SERPL-SCNC: 138 MMOL/L (ref 136–145)
TRIGL SERPL-MCNC: 54 MG/DL

## 2021-01-01 PROCEDURE — 80053 COMPREHEN METABOLIC PANEL: CPT

## 2021-01-01 PROCEDURE — 99213 OFFICE O/P EST LOW 20 MIN: CPT | Performed by: FAMILY MEDICINE

## 2021-01-01 PROCEDURE — 3079F DIAST BP 80-89 MM HG: CPT | Performed by: FAMILY MEDICINE

## 2021-01-01 PROCEDURE — 1160F RVW MEDS BY RX/DR IN RCRD: CPT | Performed by: FAMILY MEDICINE

## 2021-01-01 PROCEDURE — 80061 LIPID PANEL: CPT

## 2021-01-01 PROCEDURE — 36415 COLL VENOUS BLD VENIPUNCTURE: CPT

## 2021-01-01 PROCEDURE — G0438 PPPS, INITIAL VISIT: HCPCS | Performed by: FAMILY MEDICINE

## 2021-01-01 PROCEDURE — 3075F SYST BP GE 130 - 139MM HG: CPT | Performed by: FAMILY MEDICINE

## 2021-01-01 RX ORDER — METOPROLOL SUCCINATE 25 MG/1
25 TABLET, EXTENDED RELEASE ORAL DAILY
Qty: 30 TABLET | Refills: 0 | Status: SHIPPED | OUTPATIENT
Start: 2021-01-01 | End: 2021-01-01

## 2021-01-01 RX ORDER — HYDROCHLOROTHIAZIDE 25 MG/1
25 TABLET ORAL DAILY
Qty: 90 TABLET | Refills: 1 | Status: SHIPPED | OUTPATIENT
Start: 2021-01-01 | End: 2022-01-01 | Stop reason: HOSPADM

## 2021-01-01 RX ORDER — METOPROLOL SUCCINATE 50 MG/1
50 TABLET, EXTENDED RELEASE ORAL DAILY
Qty: 90 TABLET | Refills: 1 | Status: SHIPPED | OUTPATIENT
Start: 2021-01-01 | End: 2022-01-01 | Stop reason: HOSPADM

## 2021-01-01 RX ORDER — LOSARTAN POTASSIUM 50 MG/1
50 TABLET ORAL DAILY
Qty: 90 TABLET | Refills: 1 | Status: SHIPPED | OUTPATIENT
Start: 2021-01-01 | End: 2021-01-01

## 2021-01-01 RX ORDER — METOPROLOL SUCCINATE 50 MG/1
50 TABLET, EXTENDED RELEASE ORAL DAILY
Qty: 90 TABLET | Refills: 1 | Status: SHIPPED | OUTPATIENT
Start: 2021-01-01 | End: 2021-01-01

## 2021-01-01 RX ORDER — LOSARTAN POTASSIUM 25 MG/1
50 TABLET ORAL DAILY
Qty: 180 TABLET | Refills: 1 | Status: SHIPPED | OUTPATIENT
Start: 2021-01-01 | End: 2021-01-01 | Stop reason: SDUPTHER

## 2021-01-01 RX ORDER — HYDROCHLOROTHIAZIDE 25 MG/1
25 TABLET ORAL DAILY
Qty: 90 TABLET | Refills: 1 | Status: SHIPPED | OUTPATIENT
Start: 2021-01-01 | End: 2021-01-01 | Stop reason: SDUPTHER

## 2021-01-01 RX ORDER — LOSARTAN POTASSIUM 25 MG/1
50 TABLET ORAL DAILY
Qty: 180 TABLET | Refills: 1 | Status: SHIPPED | OUTPATIENT
Start: 2021-01-01 | End: 2022-01-01 | Stop reason: HOSPADM

## 2021-01-01 RX ORDER — HYDROCHLOROTHIAZIDE 12.5 MG/1
12.5 CAPSULE, GELATIN COATED ORAL DAILY
Qty: 90 CAPSULE | Refills: 1 | Status: SHIPPED | OUTPATIENT
Start: 2021-01-01 | End: 2021-01-01

## 2021-01-14 DIAGNOSIS — I10 ESSENTIAL HYPERTENSION: ICD-10-CM

## 2021-01-14 RX ORDER — LOSARTAN POTASSIUM 50 MG/1
TABLET ORAL
Qty: 90 TABLET | Refills: 1 | Status: SHIPPED | OUTPATIENT
Start: 2021-01-14 | End: 2021-01-01 | Stop reason: SDUPTHER

## 2021-01-14 RX ORDER — HYDROCHLOROTHIAZIDE 12.5 MG/1
CAPSULE, GELATIN COATED ORAL
Qty: 90 CAPSULE | Refills: 1 | Status: SHIPPED | OUTPATIENT
Start: 2021-01-14 | End: 2021-01-01 | Stop reason: SDUPTHER

## 2021-01-19 ENCOUNTER — VBI (OUTPATIENT)
Dept: ADMINISTRATIVE | Facility: OTHER | Age: 76
End: 2021-01-19

## 2021-01-27 ENCOUNTER — VBI (OUTPATIENT)
Dept: ADMINISTRATIVE | Facility: OTHER | Age: 76
End: 2021-01-27

## 2021-01-28 ENCOUNTER — OFFICE VISIT (OUTPATIENT)
Dept: FAMILY MEDICINE CLINIC | Facility: CLINIC | Age: 76
End: 2021-01-28
Payer: COMMERCIAL

## 2021-01-28 VITALS
DIASTOLIC BLOOD PRESSURE: 80 MMHG | HEIGHT: 65 IN | BODY MASS INDEX: 29.99 KG/M2 | WEIGHT: 180 LBS | SYSTOLIC BLOOD PRESSURE: 130 MMHG

## 2021-01-28 DIAGNOSIS — E78.2 MIXED HYPERLIPIDEMIA: ICD-10-CM

## 2021-01-28 DIAGNOSIS — R80.9 MICROALBUMINURIA: ICD-10-CM

## 2021-01-28 DIAGNOSIS — I10 ESSENTIAL HYPERTENSION: Primary | ICD-10-CM

## 2021-01-28 PROCEDURE — 99442 PR PHYS/QHP TELEPHONE EVALUATION 11-20 MIN: CPT | Performed by: FAMILY MEDICINE

## 2021-01-28 NOTE — PROGRESS NOTES
Virtual Brief Visit    Assessment/Plan:    Problem List Items Addressed This Visit        Cardiovascular and Mediastinum    Essential hypertension - Primary     Patient's blood pressure is stable  Continue hydrochlorothiazide 12 5 milligram, losartan 50 milligram          Relevant Orders    Comprehensive metabolic panel       Other    Microalbuminuria     Blood pressure is at goal   Continue losartan  BMI 29 0-29 9,adult     BMI Counseling: Body mass index is 29 95 kg/m²  The BMI is above normal  Nutrition recommendations include reducing portion sizes, decreasing overall calorie intake and 3-5 servings of fruits/vegetables daily  Exercise recommendations include moderate aerobic physical activity for 150 minutes/week  Mixed hyperlipidemia     Patient encouraged to continue with low-fat diet  Metabolic labs at 6 months  Relevant Orders    Lipid panel                Reason for visit is   Chief Complaint   Patient presents with    Follow-up     review labs    Virtual Brief Visit        Encounter provider Tone Cardona MD    Provider located at [address] [place] [id number]    Recent Visits  No visits were found meeting these conditions  Showing recent visits within past 7 days and meeting all other requirements     Today's Visits  Date Type Provider Dept   01/28/21 Office Visit Tone Cardona MD [facility]   Showing today's visits and meeting all other requirements     Future Appointments  No visits were found meeting these conditions  Showing future appointments within next 150 days and meeting all other requirements        After connecting through telephone, the patient was identified by name and date of birth  Edna Yeh was informed that this is a telemedicine visit and that the visit is being conducted through telephone  My office door was closed  No one else was in the room    She acknowledged consent and understanding of privacy and security of the platform  The patient has agreed to participate and understands she can discontinue the visit at any time  Patient is aware this is a billable service  Subjective    Shilpi Murry is a 66 y o  female   Hypertension  This is a chronic problem  The current episode started more than 1 year ago  The problem is controlled  Pertinent negatives include no chest pain, headaches, palpitations or shortness of breath  Risk factors for coronary artery disease include post-menopausal state  Treatments tried: Hydrochlorothiazide 12 5, losartan 50 milligram  The current treatment provides significant improvement  There are no compliance problems  Past Medical History:   Diagnosis Date    Hypertension        Past Surgical History:   Procedure Laterality Date    NO PAST SURGERIES      [place] COLONOSCOPY FLX DX W/COLLJ SPEC WHEN PFRMD N/A 2/2/2017    Procedure: COLONOSCOPY;  Surgeon: Soheila Barnett MD;  [facility]; Service: Gastroenterology       Current Outpatient Medications   Medication Sig Dispense Refill    hydrochlorothiazide (MICROZIDE) 12 5 mg capsule TAKE 1 CAPSULE BY MOUTH EVERY DAY 90 capsule 1    losartan (COZAAR) 50 mg tablet TAKE 1 TABLET BY MOUTH EVERY DAY 90 tablet 1     No current facility-administered medications for this visit  No Known Allergies    Review of Systems   Constitutional: Negative  Eyes: Negative  Respiratory: Negative  Negative for shortness of breath  Cardiovascular: Negative  Negative for chest pain and palpitations  Gastrointestinal: Negative  Endocrine: Negative  Genitourinary: Negative  Musculoskeletal: Negative  Negative for myalgias  Neurological: Negative  Negative for headaches  Psychiatric/Behavioral: Negative          Vitals:    01/28/21 0915   BP: 130/80   Weight: 81 6 kg (180 lb)   Height: 5' 5" (1 651 m)     Recent Results (from the past 1344 hour(s))   Comprehensive metabolic panel    Collection Time: 12/04/20  8:42 AM   Result Value Ref Range    Sodium 142 136 - 145 mmol/L    Potassium 3 5 3 5 - 5 3 mmol/L    Chloride 109 (H) 100 - 108 mmol/L    CO2 30 21 - 32 mmol/L    ANION GAP 3 (L) 4 - 13 mmol/L    BUN 21 5 - 25 mg/dL    Creatinine 0 80 0 60 - 1 30 mg/dL    Glucose, Fasting 85 65 - 99 mg/dL    Calcium 8 8 8 3 - 10 1 mg/dL    Corrected Calcium 9 8 8 3 - 10 1 mg/dL    AST 20 5 - 45 U/L    ALT 28 12 - 78 U/L    Alkaline Phosphatase 69 46 - 116 U/L    Total Protein 8 8 (H) 6 4 - 8 2 g/dL    Albumin 2 8 (L) 3 5 - 5 0 g/dL    Total Bilirubin 0 59 0 20 - 1 00 mg/dL    eGFR 72 ml/min/1 73sq m   ]    I spent 15 minutes with patient today in which greater than 50% of the time was spent in counseling/coordination of care regarding Reviewing labs, management of symptoms  VIRTUAL VISIT DISCLAIMER    Haja Zuniga acknowledges that she has consented to an online visit or consultation  She understands that the online visit is based solely on information provided by her, and that, in the absence of a face-to-face physical evaluation by the physician, the diagnosis she receives is both limited and provisional in terms of accuracy and completeness  This is not intended to replace a full medical face-to-face evaluation by the physician  Haja Zuniga understands and accepts these terms

## 2021-02-06 ENCOUNTER — IMMUNIZATIONS (OUTPATIENT)
Dept: FAMILY MEDICINE CLINIC | Facility: HOSPITAL | Age: 76
End: 2021-02-06

## 2021-02-06 DIAGNOSIS — Z23 ENCOUNTER FOR IMMUNIZATION: Primary | ICD-10-CM

## 2021-02-06 PROCEDURE — 0001A SARS-COV-2 / COVID-19 MRNA VACCINE (PFIZER-BIONTECH) 30 MCG: CPT

## 2021-02-06 PROCEDURE — 91300 SARS-COV-2 / COVID-19 MRNA VACCINE (PFIZER-BIONTECH) 30 MCG: CPT

## 2021-02-26 ENCOUNTER — IMMUNIZATIONS (OUTPATIENT)
Dept: FAMILY MEDICINE CLINIC | Facility: HOSPITAL | Age: 76
End: 2021-02-26

## 2021-02-26 DIAGNOSIS — Z23 ENCOUNTER FOR IMMUNIZATION: Primary | ICD-10-CM

## 2021-02-26 PROCEDURE — 91300 SARS-COV-2 / COVID-19 MRNA VACCINE (PFIZER-BIONTECH) 30 MCG: CPT

## 2021-02-26 PROCEDURE — 0002A SARS-COV-2 / COVID-19 MRNA VACCINE (PFIZER-BIONTECH) 30 MCG: CPT

## 2021-03-22 NOTE — TELEPHONE ENCOUNTER
Left message on machine for patient to call us back Thank you for letting him know. We will see what comes of the PA

## 2021-06-24 NOTE — PROGRESS NOTES
[facility]  coding opportunities          Chart reviewed, no opportunity found: CHART REVIEWED, NO OPPORTUNITY FOUND                     Patients insurance company:  [employer] [facility] (Medicare Advantage and Commercial)

## 2021-07-01 PROBLEM — E78.2 MIXED HYPERLIPIDEMIA: Status: RESOLVED | Noted: 2021-01-28 | Resolved: 2021-01-01

## 2021-07-01 PROBLEM — R80.9 MICROALBUMINURIA: Status: RESOLVED | Noted: 2017-04-13 | Resolved: 2021-01-01

## 2021-07-01 NOTE — ASSESSMENT & PLAN NOTE
Patient's blood pressure is elevated  advised to start metoprolol XL 25 milligram   Continue losartan 50 milligram, hydrochlorothiazide 12 5 mg  Unable to tolerate higher dose of losartan due nosebleeds

## 2021-07-01 NOTE — PROGRESS NOTES
Assessment and Plan:     Problem List Items Addressed This Visit     None           Preventive health issues were discussed with patient, and age appropriate screening tests were ordered as noted in patient's After Visit Summary  Personalized health advice and appropriate referrals for health education or preventive services given if needed, as noted in patient's After Visit Summary  History of Present Illness:     Patient presents for Medicare Annual Wellness visit    Patient Care Team:  Tamiko Dasilva MD as PCP - MD Suzie Salmeron MD Charls Mages, MD Tawni Sinks, MD as Endoscopist     Problem List:     Patient Active Problem List   Diagnosis    Essential hypertension    Microalbuminuria    Medicare annual wellness visit, subsequent    Need for prophylactic vaccination and inoculation against influenza    BMI 29 0-29 9,adult    Mixed hyperlipidemia      Past Medical and Surgical History:     Past Medical History:   Diagnosis Date    Hypertension      Past Surgical History:   Procedure Laterality Date    NO PAST SURGERIES      [place] COLONOSCOPY FLX DX W/COLLJ SPEC WHEN PFRMD N/A 2/2/2017    Procedure: COLONOSCOPY;  Surgeon: Suzie Nunes MD;  [facility];   Service: Gastroenterology      Family History:     Family History   Family history unknown: Yes      Social History:     Social History     Socioeconomic History    Marital status:      Spouse name: Not on file    Number of children: Not on file    Years of education: Not on file    Highest education level: Not on file   Occupational History    Not on file   Tobacco Use    Smoking status: Former Smoker    Smokeless tobacco: Never Used   Substance and Sexual Activity    Alcohol use: No    Drug use: No    Sexual activity: Not on file   Other Topics Concern    Not on file   Social History Narrative    Daily caffeine consumption 2-3 servings a day     Social Determinants of Health     Financial Resource Strain:     Difficulty of Paying Living Expenses:    Food Insecurity:     Worried About Running Out of Food in the Last Year:     [address] in the Last Year:    Transportation Needs:     Lack of Transportation (Medical):  Lack of Transportation (Non-Medical):    Physical Activity:     Days of Exercise per Week:     Minutes of Exercise per Session:    Stress:     Feeling of Stress :    Social Connections:     Frequency of Communication with Friends and Family:     Frequency of Social Gatherings with Friends and Family:     Attends Buddhist Services:     Active Member of Clubs or Organizations:     Attends Club or Organization Meetings:     Marital Status:    Intimate Partner Violence:     Fear of Current or Ex-Partner:     Emotionally Abused:     Physically Abused:     Sexually Abused:       Medications and Allergies:     Current Outpatient Medications   Medication Sig Dispense Refill    hydrochlorothiazide (MICROZIDE) 12 5 mg capsule TAKE 1 CAPSULE BY MOUTH EVERY DAY 90 capsule 1    losartan (COZAAR) 50 mg tablet TAKE 1 TABLET BY MOUTH EVERY DAY 90 tablet 1     No current facility-administered medications for this visit  No Known Allergies   Immunizations:     Immunization History   Administered Date(s) Administered    INFLUENZA 09/25/2020    Influenza Quadrivalent, 6-35 Months IM 12/28/2016    Influenza, high dose seasonal 0 7 mL 10/24/2019    Pneumococcal Conjugate 13-Valent 12/28/2016    Pneumococcal Polysaccharide PPV23 10/24/2019    SARS-CoV-2 / COVID-19 mRNA IM ([employer]) 02/06/2021, 02/26/2021      Health Maintenance:         Topic Date Due    Hepatitis C Screening  Never done         Topic Date Due    Influenza Vaccine (1) 09/01/2021      Medicare Health Risk Assessment:     LMP  (LMP Unknown)      Sohan Holloway is here for her Initial Wellness visit  Health Risk Assessment:   Patient rates overall health as good   Patient feels that their physical health rating is much better  Patient is very satisfied with their life  Eyesight was rated as same  Hearing was rated as same  Patient feels that their emotional and mental health rating is much better  Patients states they are never, rarely angry  Patient states they are never, rarely unusually tired/fatigued  Pain experienced in the last 7 days has been none  Patient states that she has experienced no weight loss or gain in last 6 months  Fall Risk Screening: In the past year, patient has experienced: no history of falling in past year      Urinary Incontinence Screening:   Patient has not leaked urine accidently in the last six months  Home Safety:  Patient does not have trouble with stairs inside or outside of their home  Patient has working smoke alarms and has working carbon monoxide detector  Home safety hazards include: none  Nutrition:   Current diet is Regular  Medications:   Patient is not currently taking any over-the-counter supplements  Patient is able to manage medications  Activities of Daily Living (ADLs)/Instrumental Activities of Daily Living (IADLs):   Walk and transfer into and out of bed and chair?: Yes  Dress and groom yourself?: Yes    Bathe or shower yourself?: Yes    Feed yourself? Yes  Do your laundry/housekeeping?: Yes  Manage your money, pay your bills and track your expenses?: Yes  Make your own meals?: Yes    Do your own shopping?: Yes    Previous Hospitalizations:   Any hospitalizations or ED visits within the last 12 months?: No      Advance Care Planning:   Living will: Yes    Durable POA for healthcare:  Yes    Advanced directive: Yes      Cognitive Screening:   Provider or family/friend/caregiver concerned regarding cognition?: No    PREVENTIVE SCREENINGS      Cardiovascular Screening:    General: Screening Not Indicated and History Lipid Disorder      Diabetes Screening:     General: Screening Current      Colorectal Cancer Screening:     General: Risks and Benefits Discussed and Screening Not Indicated      Breast Cancer Screening:     General: Risks and Benefits Discussed and Screening Not Indicated      Cervical Cancer Screening:    General: Screening Not Indicated      Osteoporosis Screening:    General: Risks and Benefits Discussed, Screening Not Indicated and Patient Declines      Abdominal Aortic Aneurysm (AAA) Screening:        General: Risks and Benefits Discussed and Screening Not Indicated      Lung Cancer Screening:     General: Screening Not Indicated      Hepatitis C Screening:    General: Patient Declines    Screening, Brief Intervention, and Referral to Treatment (SBIRT)    Screening  Typical number of drinks in a day: 0  Typical number of drinks in a week: 0  Interpretation: Low risk drinking behavior  Other Counseling Topics:   Car/seat belt/driving safety, skin self-exam, sunscreen and regular weightbearing exercise and calcium and vitamin D intake         Landy Lucas MD

## 2021-07-01 NOTE — PROGRESS NOTES
Subjective:      Patient ID: Vishnu Stearns is a 66 y o  female  Hypertension  This is a recurrent problem  The current episode started 1 to 4 weeks ago  The problem is unchanged  The problem is uncontrolled  Pertinent negatives include no chest pain, headaches, malaise/fatigue, peripheral edema or shortness of breath  Risk factors for coronary artery disease include post-menopausal state  Treatments tried: Losartan 50, hydrochlorothiazide 12 5  The current treatment provides significant improvement  There are no compliance problems  Past Medical History:   Diagnosis Date    Hypertension        Family History   Family history unknown: Yes       Past Surgical History:   Procedure Laterality Date    NO PAST SURGERIES      DE COLONOSCOPY FLX DX W/COLLJ SPEC WHEN PFRMD N/A 2/2/2017    Procedure: COLONOSCOPY;  Surgeon: Manuel Enrique MD;  [facility]; Service: Gastroenterology        reports that she has quit smoking  She has never used smokeless tobacco  She reports that she does not drink alcohol and does not use drugs  Current Outpatient Medications:     hydrochlorothiazide (MICROZIDE) 12 5 mg capsule, TAKE 1 CAPSULE BY MOUTH EVERY DAY, Disp: 90 capsule, Rfl: 1    losartan (COZAAR) 50 mg tablet, TAKE 1 TABLET BY MOUTH EVERY DAY, Disp: 90 tablet, Rfl: 1    metoprolol succinate (TOPROL-XL) 25 mg 24 hr tablet, Take 1 tablet (25 mg total) by mouth daily, Disp: 30 tablet, Rfl: 0    The following portions of the patient's history were reviewed and updated as appropriate: allergies, current medications, past family history, past medical history, past social history, past surgical history and problem list     Review of Systems   Constitutional: Negative  Negative for malaise/fatigue  HENT: Negative  Eyes: Negative  Respiratory: Negative  Negative for shortness of breath  Cardiovascular: Negative  Negative for chest pain  Gastrointestinal: Negative  Endocrine: Negative  Genitourinary: Negative  Musculoskeletal: Negative  Skin: Negative  Neurological: Negative  Negative for headaches  Psychiatric/Behavioral: Negative  Objective:    /96 (BP Location: Left arm, Patient Position: Sitting, Cuff Size: Large)   Pulse 79   Temp 98 7 °F (37 1 °C) (Tympanic)   Resp 16   Ht 5' 5" (1 651 m)   Wt 78 5 kg (173 lb)   LMP  (LMP Unknown)   BMI 28 79 kg/m²      Physical Exam  Constitutional:       Appearance: She is well-developed  Eyes:      Pupils: Pupils are equal, round, and reactive to light  Cardiovascular:      Rate and Rhythm: Normal rate and regular rhythm  Heart sounds: Normal heart sounds  Pulmonary:      Effort: Pulmonary effort is normal       Breath sounds: Normal breath sounds  Abdominal:      General: Bowel sounds are normal       Palpations: Abdomen is soft  Musculoskeletal:         General: Normal range of motion  Cervical back: Normal range of motion  Neurological:      Mental Status: She is alert and oriented to person, place, and time     Psychiatric:         Behavior: Behavior normal          Judgment: Judgment normal            Recent Results (from the past 1008 hour(s))   Comprehensive metabolic panel    Collection Time: 06/18/21  9:45 AM   Result Value Ref Range    Sodium 138 136 - 145 mmol/L    Potassium 3 8 3 5 - 5 3 mmol/L    Chloride 107 100 - 108 mmol/L    CO2 28 21 - 32 mmol/L    ANION GAP 3 (L) 4 - 13 mmol/L    BUN 33 (H) 5 - 25 mg/dL    Creatinine 0 93 0 60 - 1 30 mg/dL    Glucose, Fasting 86 65 - 99 mg/dL    Calcium 9 4 8 3 - 10 1 mg/dL    Corrected Calcium 10 4 (H) 8 3 - 10 1 mg/dL    AST 33 5 - 45 U/L    ALT 54 12 - 78 U/L    Alkaline Phosphatase 66 46 - 116 U/L    Total Protein 9 8 (H) 6 4 - 8 2 g/dL    Albumin 2 8 (L) 3 5 - 5 0 g/dL    Total Bilirubin 0 58 0 20 - 1 00 mg/dL    eGFR 59 ml/min/1 73sq m   Lipid panel    Collection Time: 06/18/21  9:45 AM   Result Value Ref Range    Cholesterol 153 50 - 200 mg/dL    Triglycerides 54 <=150 mg/dL    HDL, Direct 77 >=40 mg/dL    LDL Calculated 65 0 - 100 mg/dL    Non-HDL-Chol (CHOL-HDL) 76 mg/dl       Assessment/Plan:    No problem-specific Assessment & Plan notes found for this encounter  Problem List Items Addressed This Visit        Cardiovascular and Mediastinum    Essential hypertension - Primary     Patient's blood pressure is elevated  advised to start metoprolol XL 25 milligram   Continue losartan 50 milligram, hydrochlorothiazide 12 5 mg  Unable to tolerate higher dose of losartan due nosebleeds           Relevant Medications    metoprolol succinate (TOPROL-XL) 25 mg 24 hr tablet       Other    Medicare annual wellness visit, initial

## 2021-07-08 NOTE — PROGRESS NOTES
Subjective:      Patient ID: Tone Way is a 66 y o  female  HPI  Patient is here for blood pressure check  Currently on losartan 50, hydrochlorothiazide 12 5  Started on metoprolol XL 25 milligrams since blood pressure was elevated  Patient did not want to increase the dose of losartan, since she developed nasal bleed on higher dose  Tolerating the current combination of medications well  The blood pressure did improve slightly however continues to be borderline high  Patient is totally asymptomatic  Past Medical History:   Diagnosis Date    Hypertension        Family History   Family history unknown: Yes       Past Surgical History:   Procedure Laterality Date    NO PAST SURGERIES      DC COLONOSCOPY FLX DX W/COLLJ SPEC WHEN PFRMD N/A 2/2/2017    Procedure: COLONOSCOPY;  Surgeon: Galen Guaman MD;  [facility]; Service: Gastroenterology        reports that she has quit smoking  She has never used smokeless tobacco  She reports that she does not drink alcohol and does not use drugs  Current Outpatient Medications:     hydrochlorothiazide (MICROZIDE) 12 5 mg capsule, Take 1 capsule (12 5 mg total) by mouth daily, Disp: 90 capsule, Rfl: 1    losartan (COZAAR) 50 mg tablet, Take 1 tablet (50 mg total) by mouth daily, Disp: 90 tablet, Rfl: 1    metoprolol succinate (TOPROL-XL) 50 mg 24 hr tablet, Take 1 tablet (50 mg total) by mouth daily, Disp: 90 tablet, Rfl: 1    The following portions of the patient's history were reviewed and updated as appropriate: allergies, current medications, past family history, past medical history, past social history, past surgical history and problem list     Review of Systems   Constitutional: Negative  HENT: Negative  Eyes: Negative  Respiratory: Negative  Cardiovascular: Negative  Gastrointestinal: Negative  Endocrine: Negative  Genitourinary: Negative  Musculoskeletal: Negative  Skin: Negative  Neurological: Negative  Psychiatric/Behavioral: Negative  Objective:    /90 (BP Location: Left arm, Patient Position: Sitting, Cuff Size: Standard)   Pulse 73   Temp 98 1 °F (36 7 °C) (Tympanic)   Resp 16   Ht 5' 5" (1 651 m)   Wt 77 6 kg (171 lb)   LMP  (LMP Unknown)   SpO2 93%   BMI 28 46 kg/m²      Physical Exam  Constitutional:       Appearance: She is well-developed  Eyes:      Pupils: Pupils are equal, round, and reactive to light  Cardiovascular:      Rate and Rhythm: Normal rate and regular rhythm  Heart sounds: Normal heart sounds  Pulmonary:      Effort: Pulmonary effort is normal       Breath sounds: Normal breath sounds  Abdominal:      General: Bowel sounds are normal       Palpations: Abdomen is soft  Musculoskeletal:         General: Normal range of motion  Cervical back: Normal range of motion  Neurological:      Mental Status: She is alert and oriented to person, place, and time     Psychiatric:         Behavior: Behavior normal          Judgment: Judgment normal            Recent Results (from the past 1008 hour(s))   Comprehensive metabolic panel    Collection Time: 06/18/21  9:45 AM   Result Value Ref Range    Sodium 138 136 - 145 mmol/L    Potassium 3 8 3 5 - 5 3 mmol/L    Chloride 107 100 - 108 mmol/L    CO2 28 21 - 32 mmol/L    ANION GAP 3 (L) 4 - 13 mmol/L    BUN 33 (H) 5 - 25 mg/dL    Creatinine 0 93 0 60 - 1 30 mg/dL    Glucose, Fasting 86 65 - 99 mg/dL    Calcium 9 4 8 3 - 10 1 mg/dL    Corrected Calcium 10 4 (H) 8 3 - 10 1 mg/dL    AST 33 5 - 45 U/L    ALT 54 12 - 78 U/L    Alkaline Phosphatase 66 46 - 116 U/L    Total Protein 9 8 (H) 6 4 - 8 2 g/dL    Albumin 2 8 (L) 3 5 - 5 0 g/dL    Total Bilirubin 0 58 0 20 - 1 00 mg/dL    eGFR 59 ml/min/1 73sq m   Lipid panel    Collection Time: 06/18/21  9:45 AM   Result Value Ref Range    Cholesterol 153 50 - 200 mg/dL    Triglycerides 54 <=150 mg/dL    HDL, Direct 77 >=40 mg/dL    LDL Calculated 65 0 - 100 mg/dL Non-HDL-Chol (CHOL-HDL) 76 mg/dl       Assessment/Plan:    No problem-specific Assessment & Plan notes found for this encounter  Problem List Items Addressed This Visit        Cardiovascular and Mediastinum    Essential hypertension     Patient's blood pressure continues to be borderline high  Advised to increase metoprolol to 50 milligram   Continue losartan 50, hydrochlorothiazide 12 5  Follow-up 8 to 12 weeks to reassess blood pressure           Relevant Medications    hydrochlorothiazide (MICROZIDE) 12 5 mg capsule    losartan (COZAAR) 50 mg tablet    metoprolol succinate (TOPROL-XL) 50 mg 24 hr tablet

## 2021-07-08 NOTE — ASSESSMENT & PLAN NOTE
Patient's blood pressure continues to be borderline high  Advised to increase metoprolol to 50 milligram   Continue losartan 50, hydrochlorothiazide 12 5  Follow-up 8 to 12 weeks to reassess blood pressure

## 2021-08-03 NOTE — TELEPHONE ENCOUNTER
Let her discontinue losartan, can increase metoprolol to 2 tablet daily, total 50 mg dose  Can refer her to ENT to r/o raúl in nose  If she has BP monitor at home she can call me back with BP value, if not I can see her back to recheck BP on higher dose of metoprolol  Let me know if she ants ENT evaluation

## 2021-08-03 NOTE — TELEPHONE ENCOUNTER
Pt called and stated that she got a nose bleed this morning (her nose has been drippy) she wants to know if it could be the Losartan that caused it? She said about a month ago it was increased to 50 mg  Please call her back at work  She did get the bleeding under control it did not last long

## 2021-08-04 NOTE — TELEPHONE ENCOUNTER
Patient states she did not have a nose bleed this morning so she is going to stay on losartan for now and if anything changes call us back  Just fyi

## 2021-09-09 NOTE — PROGRESS NOTES
Subjective:      Patient ID: Eugenio Pack is a 66 y o  female  Hypertension  This is a recurrent problem  The current episode started more than 1 month ago  The problem is unchanged  The problem is uncontrolled  Pertinent negatives include no chest pain, headaches, palpitations, peripheral edema or shortness of breath  Risk factors for coronary artery disease include post-menopausal state  Treatments tried: hctz 12 5, losartan 50, metoprolol xl 50 mg  The current treatment provides mild improvement  There are no compliance problems  Past Medical History:   Diagnosis Date    Hypertension        Family History   Family history unknown: Yes       Past Surgical History:   Procedure Laterality Date    NO PAST SURGERIES      DC COLONOSCOPY FLX DX W/COLLJ SPEC WHEN PFRMD N/A 2/2/2017    Procedure: COLONOSCOPY;  Surgeon: Yuli Rutledge MD;  [facility]; Service: Gastroenterology        reports that she has quit smoking  She has never used smokeless tobacco  She reports that she does not drink alcohol and does not use drugs  Current Outpatient Medications:     metoprolol succinate (TOPROL-XL) 50 mg 24 hr tablet, Take 1 tablet (50 mg total) by mouth daily, Disp: 90 tablet, Rfl: 1    hydrochlorothiazide (HYDRODIURIL) 25 mg tablet, Take 1 tablet (25 mg total) by mouth daily, Disp: 90 tablet, Rfl: 1    losartan (COZAAR) 25 mg tablet, Take 2 tablets (50 mg total) by mouth daily, Disp: 180 tablet, Rfl: 1    The following portions of the patient's history were reviewed and updated as appropriate: allergies, current medications, past family history, past medical history, past social history, past surgical history and problem list     Review of Systems   Constitutional: Negative  Eyes: Negative  Respiratory: Negative  Negative for shortness of breath  Cardiovascular: Negative  Negative for chest pain and palpitations  Gastrointestinal: Negative  Endocrine: Negative      Genitourinary: Negative  Musculoskeletal: Negative  Negative for myalgias  Neurological: Negative  Negative for headaches  Psychiatric/Behavioral: Negative  Objective:    /90   Pulse 66   Temp 97 9 °F (36 6 °C) (Tympanic)   Resp 16   Ht 5' 5" (1 651 m)   Wt 77 1 kg (170 lb)   LMP  (LMP Unknown)   SpO2 98%   BMI 28 29 kg/m²      Physical Exam  Constitutional:       General: She is not in acute distress  Appearance: She is well-developed  Eyes:      Pupils: Pupils are equal, round, and reactive to light  Cardiovascular:      Rate and Rhythm: Normal rate and regular rhythm  Heart sounds: Normal heart sounds  Pulmonary:      Effort: Pulmonary effort is normal  No respiratory distress  Breath sounds: Normal breath sounds  Abdominal:      General: Bowel sounds are normal       Palpations: Abdomen is soft  Musculoskeletal:         General: Normal range of motion  Cervical back: Normal range of motion  Neurological:      Mental Status: She is alert and oriented to person, place, and time  Psychiatric:         Behavior: Behavior normal          Thought Content: Thought content normal          Judgment: Judgment normal            No results found for this or any previous visit (from the past 1008 hour(s))  Assessment/Plan:    No problem-specific Assessment & Plan notes found for this encounter  Problem List Items Addressed This Visit        Cardiovascular and Mediastinum    Essential hypertension - Primary     Patient's blood pressure is elevated  Advised to increase hydrochlorothiazide to 25 milligram   Continue losartan 50, metoprolol XL 50  Patient will follow-up 4 to 6 weeks to recheck blood pressure  Does not have a blood pressure cuff at home  Relevant Medications    hydrochlorothiazide (HYDRODIURIL) 25 mg tablet    losartan (COZAAR) 25 mg tablet        BMI Counseling: Body mass index is 28 29 kg/m²   The BMI is above normal  Nutrition recommendations include reducing portion sizes, decreasing overall calorie intake, 3-5 servings of fruits/vegetables daily and reducing fast food intake  Exercise recommendations include moderate aerobic physical activity for 150 minutes/week  Depression Screening Follow-up Plan: Patient's depression screening was positive with a PHQ-2 score of 0   Their PHQ-9 score was   Clinically patient does not have depression  No treatment is required

## 2021-09-09 NOTE — ASSESSMENT & PLAN NOTE
Patient's blood pressure is elevated  Advised to increase hydrochlorothiazide to 25 milligram   Continue losartan 50, metoprolol XL 50  Patient will follow-up 4 to 6 weeks to recheck blood pressure  Does not have a blood pressure cuff at home

## 2021-11-11 PROBLEM — N18.31 STAGE 3A CHRONIC KIDNEY DISEASE (HCC): Status: RESOLVED | Noted: 2021-01-01 | Resolved: 2021-01-01

## 2021-11-11 PROBLEM — N18.31 STAGE 3A CHRONIC KIDNEY DISEASE (HCC): Status: ACTIVE | Noted: 2021-01-01

## 2021-11-11 PROBLEM — Z23 NEED FOR PROPHYLACTIC VACCINATION AND INOCULATION AGAINST INFLUENZA: Status: RESOLVED | Noted: 2019-10-24 | Resolved: 2021-01-01

## 2022-01-01 ENCOUNTER — APPOINTMENT (EMERGENCY)
Dept: RADIOLOGY | Facility: HOSPITAL | Age: 77
DRG: 004 | End: 2022-01-01
Payer: COMMERCIAL

## 2022-01-01 ENCOUNTER — APPOINTMENT (INPATIENT)
Dept: NON INVASIVE DIAGNOSTICS | Facility: HOSPITAL | Age: 77
DRG: 004 | End: 2022-01-01
Payer: COMMERCIAL

## 2022-01-01 ENCOUNTER — APPOINTMENT (INPATIENT)
Dept: RADIOLOGY | Facility: HOSPITAL | Age: 77
DRG: 004 | End: 2022-01-01
Payer: COMMERCIAL

## 2022-01-01 ENCOUNTER — ANESTHESIA (INPATIENT)
Dept: PERIOP | Facility: HOSPITAL | Age: 77
DRG: 004 | End: 2022-01-01
Payer: COMMERCIAL

## 2022-01-01 ENCOUNTER — ANESTHESIA (INPATIENT)
Dept: RADIOLOGY | Facility: HOSPITAL | Age: 77
DRG: 004 | End: 2022-01-01
Payer: COMMERCIAL

## 2022-01-01 ENCOUNTER — ANESTHESIA EVENT (INPATIENT)
Dept: RADIOLOGY | Facility: HOSPITAL | Age: 77
DRG: 004 | End: 2022-01-01
Payer: COMMERCIAL

## 2022-01-01 ENCOUNTER — APPOINTMENT (INPATIENT)
Dept: GASTROENTEROLOGY | Facility: HOSPITAL | Age: 77
DRG: 004 | End: 2022-01-01
Payer: COMMERCIAL

## 2022-01-01 ENCOUNTER — ANESTHESIA EVENT (INPATIENT)
Dept: PERIOP | Facility: HOSPITAL | Age: 77
DRG: 004 | End: 2022-01-01
Payer: COMMERCIAL

## 2022-01-01 ENCOUNTER — HOSPITAL ENCOUNTER (EMERGENCY)
Facility: HOSPITAL | Age: 77
Discharge: ANOTHER HOSPITAL | End: 2022-02-26
Attending: EMERGENCY MEDICINE | Admitting: EMERGENCY MEDICINE
Payer: COMMERCIAL

## 2022-01-01 ENCOUNTER — HOSPITAL ENCOUNTER (INPATIENT)
Facility: HOSPITAL | Age: 77
LOS: 10 days | DRG: 004 | End: 2022-03-08
Attending: SURGERY | Admitting: SURGERY
Payer: COMMERCIAL

## 2022-01-01 ENCOUNTER — APPOINTMENT (EMERGENCY)
Dept: RADIOLOGY | Facility: HOSPITAL | Age: 77
End: 2022-01-01
Payer: COMMERCIAL

## 2022-01-01 VITALS
RESPIRATION RATE: 22 BRPM | OXYGEN SATURATION: 98 % | WEIGHT: 159.8 LBS | SYSTOLIC BLOOD PRESSURE: 195 MMHG | TEMPERATURE: 96.5 F | BODY MASS INDEX: 26.59 KG/M2 | HEART RATE: 103 BPM | DIASTOLIC BLOOD PRESSURE: 108 MMHG

## 2022-01-01 VITALS
RESPIRATION RATE: 9 BRPM | DIASTOLIC BLOOD PRESSURE: 58 MMHG | HEART RATE: 110 BPM | BODY MASS INDEX: 33.06 KG/M2 | SYSTOLIC BLOOD PRESSURE: 111 MMHG | TEMPERATURE: 102.2 F | OXYGEN SATURATION: 5 % | HEIGHT: 65 IN | WEIGHT: 198.41 LBS

## 2022-01-01 DIAGNOSIS — S12.9XXA CERVICAL SPINE FRACTURE (HCC): ICD-10-CM

## 2022-01-01 DIAGNOSIS — J96.01 ACUTE RESPIRATORY FAILURE WITH HYPOXIA (HCC): Primary | ICD-10-CM

## 2022-01-01 DIAGNOSIS — S02.2XXA NASAL BONE FRACTURE: ICD-10-CM

## 2022-01-01 DIAGNOSIS — S09.90XA HEAD INJURY: Primary | ICD-10-CM

## 2022-01-01 DIAGNOSIS — S02.2XXA CLOSED FRACTURE OF NASAL BONE, INITIAL ENCOUNTER: ICD-10-CM

## 2022-01-01 DIAGNOSIS — N17.9 AKI (ACUTE KIDNEY INJURY) (HCC): ICD-10-CM

## 2022-01-01 DIAGNOSIS — J18.9 PNEUMONIA: ICD-10-CM

## 2022-01-01 DIAGNOSIS — S12.9XXA: ICD-10-CM

## 2022-01-01 DIAGNOSIS — M48.02 CERVICAL STENOSIS OF SPINE: ICD-10-CM

## 2022-01-01 LAB
2HR DELTA HS TROPONIN: -15 NG/L
4HR DELTA HS TROPONIN: -21 NG/L
AAMA (MAX AMPLITUDE AA): 73.8 MM (ref 51–71)
ABO GROUP BLD BPU: NORMAL
ABO GROUP BLD: NORMAL
ABO GROUP BLD: NORMAL
ACTFMA(MAX AMPLITUDE ACTF): >30 MM (ref 2–19)
ADPMA(MAX AMPLITUDE ADP): 70.6 MM (ref 45–69)
ALBUMIN SERPL BCP-MCNC: 2.2 G/DL (ref 3.5–5)
ALP SERPL-CCNC: 98 U/L (ref 46–116)
ALT SERPL W P-5'-P-CCNC: 63 U/L (ref 12–78)
AMORPH URATE CRY URNS QL MICRO: ABNORMAL
ANION GAP SERPL CALCULATED.3IONS-SCNC: 0 MMOL/L (ref 4–13)
ANION GAP SERPL CALCULATED.3IONS-SCNC: 1 MMOL/L (ref 4–13)
ANION GAP SERPL CALCULATED.3IONS-SCNC: 1 MMOL/L (ref 4–13)
ANION GAP SERPL CALCULATED.3IONS-SCNC: 12 MMOL/L (ref 4–13)
ANION GAP SERPL CALCULATED.3IONS-SCNC: 2 MMOL/L (ref 4–13)
ANION GAP SERPL CALCULATED.3IONS-SCNC: 3 MMOL/L (ref 4–13)
ANION GAP SERPL CALCULATED.3IONS-SCNC: 4 MMOL/L (ref 4–13)
ANION GAP SERPL CALCULATED.3IONS-SCNC: 5 MMOL/L (ref 4–13)
ANION GAP SERPL CALCULATED.3IONS-SCNC: 6 MMOL/L (ref 4–13)
ANION GAP SERPL CALCULATED.3IONS-SCNC: 6 MMOL/L (ref 4–13)
ANISOCYTOSIS BLD QL SMEAR: PRESENT
AORTIC ROOT: 2.8 CM
APICAL FOUR CHAMBER EJECTION FRACTION: 51 %
APICAL FOUR CHAMBER EJECTION FRACTION: 64 %
APTT PPP: 28 SECONDS (ref 23–37)
ARTERIAL PATENCY WRIST A: NO
ARTERIAL PATENCY WRIST A: YES
ARTERIAL PATENCY WRIST A: YES
ARTIFACT: PRESENT
ASCENDING AORTA: 2.9 CM (ref 1.94–2.91)
AST SERPL W P-5'-P-CCNC: 32 U/L (ref 5–45)
ATRIAL RATE: 104 BPM
ATRIAL RATE: 110 BPM
ATRIAL RATE: 113 BPM
ATRIAL RATE: 115 BPM
ATRIAL RATE: 121 BPM
ATRIAL RATE: 63 BPM
ATRIAL RATE: 71 BPM
ATRIAL RATE: 84 BPM
ATRIAL RATE: 85 BPM
BACTERIA BLD CULT: NORMAL
BACTERIA BLD CULT: NORMAL
BACTERIA SPT RESP CULT: ABNORMAL
BACTERIA UR QL AUTO: ABNORMAL /HPF
BASE EXCESS BLDA CALC-SCNC: -1 MMOL/L (ref -2–3)
BASE EXCESS BLDA CALC-SCNC: -1.5 MMOL/L
BASE EXCESS BLDA CALC-SCNC: -1.7 MMOL/L
BASE EXCESS BLDA CALC-SCNC: -2 MMOL/L (ref -2–3)
BASE EXCESS BLDA CALC-SCNC: -3.1 MMOL/L
BASE EXCESS BLDA CALC-SCNC: -3.9 MMOL/L
BASE EXCESS BLDA CALC-SCNC: -4.6 MMOL/L
BASE EXCESS BLDA CALC-SCNC: -5.7 MMOL/L
BASE EXCESS BLDA CALC-SCNC: -6.3 MMOL/L
BASE EXCESS BLDA CALC-SCNC: -6.7 MMOL/L
BASE EXCESS BLDA CALC-SCNC: -6.9 MMOL/L
BASE EXCESS BLDA CALC-SCNC: -7.8 MMOL/L
BASE EXCESS BLDA CALC-SCNC: 0.2 MMOL/L
BASE EXCESS BLDA CALC-SCNC: 0.7 MMOL/L
BASOPHILS # BLD AUTO: 0 THOUSANDS/ΜL (ref 0–0.1)
BASOPHILS # BLD AUTO: 0.01 THOUSANDS/ΜL (ref 0–0.1)
BASOPHILS # BLD AUTO: 0.01 THOUSANDS/ΜL (ref 0–0.1)
BASOPHILS # BLD AUTO: 0.06 THOUSANDS/ΜL (ref 0–0.1)
BASOPHILS # BLD MANUAL: 0 THOUSAND/UL (ref 0–0.1)
BASOPHILS # BLD MANUAL: 0.16 THOUSAND/UL (ref 0–0.1)
BASOPHILS NFR BLD AUTO: 0 % (ref 0–1)
BASOPHILS NFR BLD AUTO: 1 % (ref 0–1)
BASOPHILS NFR MAR MANUAL: 0 % (ref 0–1)
BASOPHILS NFR MAR MANUAL: 1 % (ref 0–1)
BILIRUB SERPL-MCNC: 0.43 MG/DL (ref 0.2–1)
BILIRUB UR QL STRIP: NEGATIVE
BLD GP AB SCN SERPL QL: NEGATIVE
BODY TEMPERATURE: 100.8 DEGREES FEHRENHEIT
BODY TEMPERATURE: 101.7 DEGREES FEHRENHEIT
BODY TEMPERATURE: 102.6 DEGREES FEHRENHEIT
BODY TEMPERATURE: 99.5 DEGREES FEHRENHEIT
BPU ID: NORMAL
BUN SERPL-MCNC: 107 MG/DL (ref 5–25)
BUN SERPL-MCNC: 108 MG/DL (ref 5–25)
BUN SERPL-MCNC: 111 MG/DL (ref 5–25)
BUN SERPL-MCNC: 116 MG/DL (ref 5–25)
BUN SERPL-MCNC: 29 MG/DL (ref 5–25)
BUN SERPL-MCNC: 31 MG/DL (ref 5–25)
BUN SERPL-MCNC: 36 MG/DL (ref 5–25)
BUN SERPL-MCNC: 37 MG/DL (ref 5–25)
BUN SERPL-MCNC: 41 MG/DL (ref 5–25)
BUN SERPL-MCNC: 41 MG/DL (ref 5–25)
BUN SERPL-MCNC: 45 MG/DL (ref 5–25)
BUN SERPL-MCNC: 48 MG/DL (ref 5–25)
BUN SERPL-MCNC: 48 MG/DL (ref 5–25)
BUN SERPL-MCNC: 51 MG/DL (ref 5–25)
BUN SERPL-MCNC: 52 MG/DL (ref 5–25)
BUN SERPL-MCNC: 54 MG/DL (ref 5–25)
BUN SERPL-MCNC: 54 MG/DL (ref 5–25)
BUN SERPL-MCNC: 56 MG/DL (ref 5–25)
BUN SERPL-MCNC: 85 MG/DL (ref 5–25)
BUN SERPL-MCNC: 90 MG/DL (ref 5–25)
CA-I BLD-SCNC: 1.2 MMOL/L (ref 1.12–1.32)
CA-I BLD-SCNC: 1.22 MMOL/L (ref 1.12–1.32)
CA-I BLD-SCNC: 1.23 MMOL/L (ref 1.12–1.32)
CA-I BLD-SCNC: 1.27 MMOL/L (ref 1.12–1.32)
CA-I BLD-SCNC: 1.3 MMOL/L (ref 1.12–1.32)
CA-I BLD-SCNC: 1.3 MMOL/L (ref 1.12–1.32)
CA-I BLD-SCNC: 1.36 MMOL/L (ref 1.12–1.32)
CA-I BLD-SCNC: 1.4 MMOL/L (ref 1.12–1.32)
CALCIUM ALBUM COR SERPL-MCNC: 10 MG/DL (ref 8.3–10.1)
CALCIUM SERPL-MCNC: 10 MG/DL (ref 8.3–10.1)
CALCIUM SERPL-MCNC: 10.3 MG/DL (ref 8.3–10.1)
CALCIUM SERPL-MCNC: 8.4 MG/DL (ref 8.3–10.1)
CALCIUM SERPL-MCNC: 8.5 MG/DL (ref 8.3–10.1)
CALCIUM SERPL-MCNC: 8.5 MG/DL (ref 8.3–10.1)
CALCIUM SERPL-MCNC: 8.6 MG/DL (ref 8.3–10.1)
CALCIUM SERPL-MCNC: 8.6 MG/DL (ref 8.3–10.1)
CALCIUM SERPL-MCNC: 8.8 MG/DL (ref 8.3–10.1)
CALCIUM SERPL-MCNC: 8.9 MG/DL (ref 8.3–10.1)
CALCIUM SERPL-MCNC: 9 MG/DL (ref 8.3–10.1)
CALCIUM SERPL-MCNC: 9.1 MG/DL (ref 8.3–10.1)
CALCIUM SERPL-MCNC: 9.2 MG/DL (ref 8.3–10.1)
CALCIUM SERPL-MCNC: 9.2 MG/DL (ref 8.3–10.1)
CALCIUM SERPL-MCNC: 9.3 MG/DL (ref 8.3–10.1)
CALCIUM SERPL-MCNC: 9.4 MG/DL (ref 8.3–10.1)
CALCIUM SERPL-MCNC: 9.4 MG/DL (ref 8.3–10.1)
CALCIUM SERPL-MCNC: 9.6 MG/DL (ref 8.3–10.1)
CALCIUM SERPL-MCNC: 9.8 MG/DL (ref 8.3–10.1)
CARDIAC TROPONIN I PNL SERPL HS: 101 NG/L
CARDIAC TROPONIN I PNL SERPL HS: 80 NG/L
CARDIAC TROPONIN I PNL SERPL HS: 86 NG/L
CFFMA (FUNCTIONAL FIBRINOGEN MAX AMPLITUDE): 44.9 MM (ref 15–32)
CHLORIDE SERPL-SCNC: 106 MMOL/L (ref 100–108)
CHLORIDE SERPL-SCNC: 109 MMOL/L (ref 100–108)
CHLORIDE SERPL-SCNC: 109 MMOL/L (ref 100–108)
CHLORIDE SERPL-SCNC: 110 MMOL/L (ref 100–108)
CHLORIDE SERPL-SCNC: 110 MMOL/L (ref 100–108)
CHLORIDE SERPL-SCNC: 111 MMOL/L (ref 100–108)
CHLORIDE SERPL-SCNC: 112 MMOL/L (ref 100–108)
CHLORIDE SERPL-SCNC: 112 MMOL/L (ref 100–108)
CHLORIDE SERPL-SCNC: 113 MMOL/L (ref 100–108)
CHLORIDE SERPL-SCNC: 114 MMOL/L (ref 100–108)
CHLORIDE SERPL-SCNC: 114 MMOL/L (ref 100–108)
CHLORIDE SERPL-SCNC: 115 MMOL/L (ref 100–108)
CHLORIDE SERPL-SCNC: 116 MMOL/L (ref 100–108)
CHLORIDE SERPL-SCNC: 118 MMOL/L (ref 100–108)
CKLY30: 0 % (ref 0–2.6)
CKR(REACTION TIME): 4.1 MIN (ref 4.6–9.1)
CLARITY UR: ABNORMAL
CO2 SERPL-SCNC: 19 MMOL/L (ref 21–32)
CO2 SERPL-SCNC: 20 MMOL/L (ref 21–32)
CO2 SERPL-SCNC: 20 MMOL/L (ref 21–32)
CO2 SERPL-SCNC: 21 MMOL/L (ref 21–32)
CO2 SERPL-SCNC: 22 MMOL/L (ref 21–32)
CO2 SERPL-SCNC: 23 MMOL/L (ref 21–32)
CO2 SERPL-SCNC: 23 MMOL/L (ref 21–32)
CO2 SERPL-SCNC: 24 MMOL/L (ref 21–32)
CO2 SERPL-SCNC: 25 MMOL/L (ref 21–32)
CO2 SERPL-SCNC: 26 MMOL/L (ref 21–32)
CO2 SERPL-SCNC: 27 MMOL/L (ref 21–32)
COLOR UR: YELLOW
CREAT SERPL-MCNC: 0.9 MG/DL (ref 0.6–1.3)
CREAT SERPL-MCNC: 0.94 MG/DL (ref 0.6–1.3)
CREAT SERPL-MCNC: 0.95 MG/DL (ref 0.6–1.3)
CREAT SERPL-MCNC: 0.95 MG/DL (ref 0.6–1.3)
CREAT SERPL-MCNC: 1 MG/DL (ref 0.6–1.3)
CREAT SERPL-MCNC: 1 MG/DL (ref 0.6–1.3)
CREAT SERPL-MCNC: 1.01 MG/DL (ref 0.6–1.3)
CREAT SERPL-MCNC: 1.04 MG/DL (ref 0.6–1.3)
CREAT SERPL-MCNC: 1.07 MG/DL (ref 0.6–1.3)
CREAT SERPL-MCNC: 1.14 MG/DL (ref 0.6–1.3)
CREAT SERPL-MCNC: 1.32 MG/DL (ref 0.6–1.3)
CREAT SERPL-MCNC: 1.33 MG/DL (ref 0.6–1.3)
CREAT SERPL-MCNC: 1.35 MG/DL (ref 0.6–1.3)
CREAT SERPL-MCNC: 1.57 MG/DL (ref 0.6–1.3)
CREAT SERPL-MCNC: 1.74 MG/DL (ref 0.6–1.3)
CREAT SERPL-MCNC: 1.91 MG/DL (ref 0.6–1.3)
CREAT SERPL-MCNC: 2.35 MG/DL (ref 0.6–1.3)
CREAT SERPL-MCNC: 2.53 MG/DL (ref 0.6–1.3)
CREAT SERPL-MCNC: 2.67 MG/DL (ref 0.6–1.3)
CREAT SERPL-MCNC: 2.91 MG/DL (ref 0.6–1.3)
CROSSMATCH: NORMAL
CRTMA(RAPIDTEG MAX AMPLITUDE): 69.9 MM (ref 52–70)
E WAVE DECELERATION TIME: 182 MS
EOSINOPHIL # BLD AUTO: 0 THOUSAND/ΜL (ref 0–0.61)
EOSINOPHIL # BLD AUTO: 0.08 THOUSAND/ΜL (ref 0–0.61)
EOSINOPHIL # BLD MANUAL: 0 THOUSAND/UL (ref 0–0.4)
EOSINOPHIL NFR BLD AUTO: 0 % (ref 0–6)
EOSINOPHIL NFR BLD AUTO: 1 % (ref 0–6)
EOSINOPHIL NFR BLD MANUAL: 0 % (ref 0–6)
ERYTHROCYTE [DISTWIDTH] IN BLOOD BY AUTOMATED COUNT: 13.3 % (ref 11.6–15.1)
ERYTHROCYTE [DISTWIDTH] IN BLOOD BY AUTOMATED COUNT: 13.3 % (ref 11.6–15.1)
ERYTHROCYTE [DISTWIDTH] IN BLOOD BY AUTOMATED COUNT: 15.8 % (ref 11.6–15.1)
ERYTHROCYTE [DISTWIDTH] IN BLOOD BY AUTOMATED COUNT: 15.8 % (ref 11.6–15.1)
ERYTHROCYTE [DISTWIDTH] IN BLOOD BY AUTOMATED COUNT: 15.9 % (ref 11.6–15.1)
ERYTHROCYTE [DISTWIDTH] IN BLOOD BY AUTOMATED COUNT: 16 % (ref 11.6–15.1)
ERYTHROCYTE [DISTWIDTH] IN BLOOD BY AUTOMATED COUNT: 16.2 % (ref 11.6–15.1)
ERYTHROCYTE [DISTWIDTH] IN BLOOD BY AUTOMATED COUNT: 16.7 % (ref 11.6–15.1)
ERYTHROCYTE [DISTWIDTH] IN BLOOD BY AUTOMATED COUNT: 17.3 % (ref 11.6–15.1)
ERYTHROCYTE [DISTWIDTH] IN BLOOD BY AUTOMATED COUNT: 17.3 % (ref 11.6–15.1)
ERYTHROCYTE [DISTWIDTH] IN BLOOD BY AUTOMATED COUNT: 17.6 % (ref 11.6–15.1)
ERYTHROCYTE [DISTWIDTH] IN BLOOD BY AUTOMATED COUNT: 18.2 % (ref 11.6–15.1)
FIO2: 100 %
FIO2: 100 %
FLUAV RNA RESP QL NAA+PROBE: NEGATIVE
FLUBV RNA RESP QL NAA+PROBE: NEGATIVE
FRACTIONAL SHORTENING: 28 % (ref 28–44)
GFR SERPL CREATININE-BSD FRML MDRD: 14 ML/MIN/1.73SQ M
GFR SERPL CREATININE-BSD FRML MDRD: 16 ML/MIN/1.73SQ M
GFR SERPL CREATININE-BSD FRML MDRD: 17 ML/MIN/1.73SQ M
GFR SERPL CREATININE-BSD FRML MDRD: 19 ML/MIN/1.73SQ M
GFR SERPL CREATININE-BSD FRML MDRD: 24 ML/MIN/1.73SQ M
GFR SERPL CREATININE-BSD FRML MDRD: 27 ML/MIN/1.73SQ M
GFR SERPL CREATININE-BSD FRML MDRD: 31 ML/MIN/1.73SQ M
GFR SERPL CREATININE-BSD FRML MDRD: 37 ML/MIN/1.73SQ M
GFR SERPL CREATININE-BSD FRML MDRD: 38 ML/MIN/1.73SQ M
GFR SERPL CREATININE-BSD FRML MDRD: 38 ML/MIN/1.73SQ M
GFR SERPL CREATININE-BSD FRML MDRD: 46 ML/MIN/1.73SQ M
GFR SERPL CREATININE-BSD FRML MDRD: 49 ML/MIN/1.73SQ M
GFR SERPL CREATININE-BSD FRML MDRD: 51 ML/MIN/1.73SQ M
GFR SERPL CREATININE-BSD FRML MDRD: 53 ML/MIN/1.73SQ M
GFR SERPL CREATININE-BSD FRML MDRD: 54 ML/MIN/1.73SQ M
GFR SERPL CREATININE-BSD FRML MDRD: 54 ML/MIN/1.73SQ M
GFR SERPL CREATININE-BSD FRML MDRD: 57 ML/MIN/1.73SQ M
GFR SERPL CREATININE-BSD FRML MDRD: 57 ML/MIN/1.73SQ M
GFR SERPL CREATININE-BSD FRML MDRD: 58 ML/MIN/1.73SQ M
GFR SERPL CREATININE-BSD FRML MDRD: 61 ML/MIN/1.73SQ M
GLUCOSE SERPL-MCNC: 104 MG/DL (ref 65–140)
GLUCOSE SERPL-MCNC: 115 MG/DL (ref 65–140)
GLUCOSE SERPL-MCNC: 116 MG/DL (ref 65–140)
GLUCOSE SERPL-MCNC: 117 MG/DL (ref 65–140)
GLUCOSE SERPL-MCNC: 117 MG/DL (ref 65–140)
GLUCOSE SERPL-MCNC: 118 MG/DL (ref 65–140)
GLUCOSE SERPL-MCNC: 118 MG/DL (ref 65–140)
GLUCOSE SERPL-MCNC: 119 MG/DL (ref 65–140)
GLUCOSE SERPL-MCNC: 120 MG/DL (ref 65–140)
GLUCOSE SERPL-MCNC: 121 MG/DL (ref 65–140)
GLUCOSE SERPL-MCNC: 122 MG/DL (ref 65–140)
GLUCOSE SERPL-MCNC: 124 MG/DL (ref 65–140)
GLUCOSE SERPL-MCNC: 126 MG/DL (ref 65–140)
GLUCOSE SERPL-MCNC: 127 MG/DL (ref 65–140)
GLUCOSE SERPL-MCNC: 128 MG/DL (ref 65–140)
GLUCOSE SERPL-MCNC: 128 MG/DL (ref 65–140)
GLUCOSE SERPL-MCNC: 129 MG/DL (ref 65–140)
GLUCOSE SERPL-MCNC: 130 MG/DL (ref 65–140)
GLUCOSE SERPL-MCNC: 132 MG/DL (ref 65–140)
GLUCOSE SERPL-MCNC: 132 MG/DL (ref 65–140)
GLUCOSE SERPL-MCNC: 135 MG/DL (ref 65–140)
GLUCOSE SERPL-MCNC: 140 MG/DL (ref 65–140)
GLUCOSE SERPL-MCNC: 142 MG/DL (ref 65–140)
GLUCOSE SERPL-MCNC: 142 MG/DL (ref 65–140)
GLUCOSE SERPL-MCNC: 144 MG/DL (ref 65–140)
GLUCOSE SERPL-MCNC: 162 MG/DL (ref 65–140)
GLUCOSE SERPL-MCNC: 163 MG/DL (ref 65–140)
GLUCOSE SERPL-MCNC: 175 MG/DL (ref 65–140)
GLUCOSE UR STRIP-MCNC: NEGATIVE MG/DL
GRAM STN SPEC: ABNORMAL
GRAN CASTS #/AREA URNS LPF: ABNORMAL /[LPF]
HCO3 BLDA-SCNC: 17 MMOL/L (ref 22–28)
HCO3 BLDA-SCNC: 17.9 MMOL/L (ref 22–28)
HCO3 BLDA-SCNC: 18.1 MMOL/L (ref 22–28)
HCO3 BLDA-SCNC: 18.6 MMOL/L (ref 22–28)
HCO3 BLDA-SCNC: 18.8 MMOL/L (ref 22–28)
HCO3 BLDA-SCNC: 20.2 MMOL/L (ref 22–28)
HCO3 BLDA-SCNC: 21.9 MMOL/L (ref 22–28)
HCO3 BLDA-SCNC: 22.4 MMOL/L (ref 22–28)
HCO3 BLDA-SCNC: 22.8 MMOL/L (ref 22–28)
HCO3 BLDA-SCNC: 23.9 MMOL/L (ref 22–28)
HCO3 BLDA-SCNC: 24.6 MMOL/L (ref 24–30)
HCO3 BLDA-SCNC: 24.8 MMOL/L (ref 22–28)
HCO3 BLDA-SCNC: 24.9 MMOL/L (ref 22–28)
HCO3 BLDA-SCNC: 25.2 MMOL/L (ref 22–28)
HCT VFR BLD AUTO: 20 % (ref 34.8–46.1)
HCT VFR BLD AUTO: 20.2 % (ref 34.8–46.1)
HCT VFR BLD AUTO: 21 % (ref 34.8–46.1)
HCT VFR BLD AUTO: 21.2 % (ref 34.8–46.1)
HCT VFR BLD AUTO: 22 % (ref 34.8–46.1)
HCT VFR BLD AUTO: 24.1 % (ref 34.8–46.1)
HCT VFR BLD AUTO: 25.5 % (ref 34.8–46.1)
HCT VFR BLD AUTO: 26 % (ref 34.8–46.1)
HCT VFR BLD AUTO: 26.1 % (ref 34.8–46.1)
HCT VFR BLD AUTO: 26.4 % (ref 34.8–46.1)
HCT VFR BLD AUTO: 26.6 % (ref 34.8–46.1)
HCT VFR BLD AUTO: 26.6 % (ref 34.8–46.1)
HCT VFR BLD AUTO: 27.5 % (ref 34.8–46.1)
HCT VFR BLD AUTO: 27.9 % (ref 34.8–46.1)
HCT VFR BLD AUTO: 28.7 % (ref 34.8–46.1)
HCT VFR BLD AUTO: 29.5 % (ref 34.8–46.1)
HCT VFR BLD AUTO: 29.5 % (ref 34.8–46.1)
HCT VFR BLD AUTO: 29.6 % (ref 34.8–46.1)
HCT VFR BLD AUTO: 29.7 % (ref 34.8–46.1)
HCT VFR BLD AUTO: 30.7 % (ref 34.8–46.1)
HCT VFR BLD CALC: 27 % (ref 34.8–46.1)
HCT VFR BLD CALC: 28 % (ref 34.8–46.1)
HGB BLD-MCNC: 10.1 G/DL (ref 11.5–15.4)
HGB BLD-MCNC: 6.5 G/DL (ref 11.5–15.4)
HGB BLD-MCNC: 6.7 G/DL (ref 11.5–15.4)
HGB BLD-MCNC: 6.7 G/DL (ref 11.5–15.4)
HGB BLD-MCNC: 6.8 G/DL (ref 11.5–15.4)
HGB BLD-MCNC: 7 G/DL (ref 11.5–15.4)
HGB BLD-MCNC: 7.7 G/DL (ref 11.5–15.4)
HGB BLD-MCNC: 8.1 G/DL (ref 11.5–15.4)
HGB BLD-MCNC: 8.3 G/DL (ref 11.5–15.4)
HGB BLD-MCNC: 8.7 G/DL (ref 11.5–15.4)
HGB BLD-MCNC: 9 G/DL (ref 11.5–15.4)
HGB BLD-MCNC: 9.1 G/DL (ref 11.5–15.4)
HGB BLD-MCNC: 9.2 G/DL (ref 11.5–15.4)
HGB BLD-MCNC: 9.2 G/DL (ref 11.5–15.4)
HGB BLD-MCNC: 9.4 G/DL (ref 11.5–15.4)
HGB BLD-MCNC: 9.4 G/DL (ref 11.5–15.4)
HGB BLD-MCNC: 9.5 G/DL (ref 11.5–15.4)
HGB BLD-MCNC: 9.5 G/DL (ref 11.5–15.4)
HGB BLD-MCNC: 9.6 G/DL (ref 11.5–15.4)
HGB BLD-MCNC: 9.9 G/DL (ref 11.5–15.4)
HGB BLDA-MCNC: 9.2 G/DL (ref 11.5–15.4)
HGB BLDA-MCNC: 9.5 G/DL (ref 11.5–15.4)
HGB UR QL STRIP.AUTO: ABNORMAL
HKHMA(MAX AMPLITUDE KAOLIN): >71 MM (ref 53–68)
HOROWITZ INDEX BLDA+IHG-RTO: 100 MM[HG]
HOROWITZ INDEX BLDA+IHG-RTO: 100 MM[HG]
HOROWITZ INDEX BLDA+IHG-RTO: 50 MM[HG]
HOROWITZ INDEX BLDA+IHG-RTO: 70 MM[HG]
HOROWITZ INDEX BLDA+IHG-RTO: 80 MM[HG]
HOROWITZ INDEX BLDA+IHG-RTO: 80 MM[HG]
IMM GRANULOCYTES # BLD AUTO: 0.04 THOUSAND/UL (ref 0–0.2)
IMM GRANULOCYTES # BLD AUTO: 0.05 THOUSAND/UL (ref 0–0.2)
IMM GRANULOCYTES # BLD AUTO: 0.06 THOUSAND/UL (ref 0–0.2)
IMM GRANULOCYTES # BLD AUTO: 0.12 THOUSAND/UL (ref 0–0.2)
IMM GRANULOCYTES NFR BLD AUTO: 0 % (ref 0–2)
IMM GRANULOCYTES NFR BLD AUTO: 1 % (ref 0–2)
INR PPP: 1.12 (ref 0.84–1.19)
INTERVENTRICULAR SEPTUM IN DIASTOLE (PARASTERNAL SHORT AXIS VIEW): 0.8 CM
INTERVENTRICULAR SEPTUM IN DIASTOLE (PARASTERNAL SHORT AXIS VIEW): 1.1 CM
INTERVENTRICULAR SEPTUM: 0.8 CM (ref 0.52–0.96)
INTERVENTRICULAR SEPTUM: 1.1 CM (ref 0.54–1)
KETONES UR STRIP-MCNC: NEGATIVE MG/DL
LAAS-AP2: 21.6 CM2
LAAS-AP4: 14.8 CM2
LACTATE SERPL-SCNC: 1.9 MMOL/L (ref 0.5–2)
LACTATE SERPL-SCNC: 2.4 MMOL/L (ref 0.5–2)
LEFT ATRIUM SIZE: 3.5 CM
LEFT INTERNAL DIMENSION IN SYSTOLE: 2.8 CM (ref 2.56–3.88)
LEFT VENTRICULAR INTERNAL DIMENSION IN DIASTOLE: 3.9 CM (ref 4.18–6.23)
LEFT VENTRICULAR INTERNAL DIMENSION IN DIASTOLE: 3.9 CM (ref 4.93–7.34)
LEFT VENTRICULAR POSTERIOR WALL IN END DIASTOLE: 0.8 CM (ref 0.5–0.95)
LEFT VENTRICULAR POSTERIOR WALL IN END DIASTOLE: 1.1 CM (ref 0.52–0.99)
LEFT VENTRICULAR STROKE VOLUME: 35 ML
LEUKOCYTE ESTERASE UR QL STRIP: NEGATIVE
LVSV (TEICH): 35 ML
LYMPHOCYTES # BLD AUTO: 0.43 THOUSAND/UL (ref 0.6–4.47)
LYMPHOCYTES # BLD AUTO: 0.59 THOUSANDS/ΜL (ref 0.6–4.47)
LYMPHOCYTES # BLD AUTO: 0.86 THOUSAND/UL (ref 0.6–4.47)
LYMPHOCYTES # BLD AUTO: 0.96 THOUSANDS/ΜL (ref 0.6–4.47)
LYMPHOCYTES # BLD AUTO: 0.98 THOUSAND/UL (ref 0.6–4.47)
LYMPHOCYTES # BLD AUTO: 1.09 THOUSANDS/ΜL (ref 0.6–4.47)
LYMPHOCYTES # BLD AUTO: 1.74 THOUSANDS/ΜL (ref 0.6–4.47)
LYMPHOCYTES # BLD AUTO: 2.23 THOUSAND/UL (ref 0.6–4.47)
LYMPHOCYTES # BLD AUTO: 21 % (ref 14–44)
LYMPHOCYTES # BLD AUTO: 4 % (ref 14–44)
LYMPHOCYTES # BLD AUTO: 5 % (ref 14–44)
LYMPHOCYTES # BLD AUTO: 6 % (ref 14–44)
LYMPHOCYTES NFR BLD AUTO: 10 % (ref 14–44)
LYMPHOCYTES NFR BLD AUTO: 12 % (ref 14–44)
LYMPHOCYTES NFR BLD AUTO: 26 % (ref 14–44)
LYMPHOCYTES NFR BLD AUTO: 7 % (ref 14–44)
MACROCYTES BLD QL AUTO: PRESENT
MACROCYTES BLD QL AUTO: PRESENT
MAGNESIUM SERPL-MCNC: 1.6 MG/DL (ref 1.6–2.6)
MAGNESIUM SERPL-MCNC: 2 MG/DL (ref 1.6–2.6)
MAGNESIUM SERPL-MCNC: 2.2 MG/DL (ref 1.6–2.6)
MAGNESIUM SERPL-MCNC: 2.3 MG/DL (ref 1.6–2.6)
MAGNESIUM SERPL-MCNC: 2.3 MG/DL (ref 1.6–2.6)
MAGNESIUM SERPL-MCNC: 2.4 MG/DL (ref 1.6–2.6)
MAGNESIUM SERPL-MCNC: 2.8 MG/DL (ref 1.6–2.6)
MCH RBC QN AUTO: 30.6 PG (ref 26.8–34.3)
MCH RBC QN AUTO: 30.7 PG (ref 26.8–34.3)
MCH RBC QN AUTO: 31.1 PG (ref 26.8–34.3)
MCH RBC QN AUTO: 31.1 PG (ref 26.8–34.3)
MCH RBC QN AUTO: 31.2 PG (ref 26.8–34.3)
MCH RBC QN AUTO: 31.2 PG (ref 26.8–34.3)
MCH RBC QN AUTO: 31.3 PG (ref 26.8–34.3)
MCH RBC QN AUTO: 31.4 PG (ref 26.8–34.3)
MCH RBC QN AUTO: 31.5 PG (ref 26.8–34.3)
MCH RBC QN AUTO: 31.5 PG (ref 26.8–34.3)
MCH RBC QN AUTO: 31.9 PG (ref 26.8–34.3)
MCH RBC QN AUTO: 32.5 PG (ref 26.8–34.3)
MCH RBC QN AUTO: 33 PG (ref 26.8–34.3)
MCH RBC QN AUTO: 33 PG (ref 26.8–34.3)
MCHC RBC AUTO-ENTMCNC: 31.2 G/DL (ref 31.4–37.4)
MCHC RBC AUTO-ENTMCNC: 31.7 G/DL (ref 31.4–37.4)
MCHC RBC AUTO-ENTMCNC: 31.8 G/DL (ref 31.4–37.4)
MCHC RBC AUTO-ENTMCNC: 31.9 G/DL (ref 31.4–37.4)
MCHC RBC AUTO-ENTMCNC: 32.1 G/DL (ref 31.4–37.4)
MCHC RBC AUTO-ENTMCNC: 32.1 G/DL (ref 31.4–37.4)
MCHC RBC AUTO-ENTMCNC: 32.2 G/DL (ref 31.4–37.4)
MCHC RBC AUTO-ENTMCNC: 32.5 G/DL (ref 31.4–37.4)
MCHC RBC AUTO-ENTMCNC: 32.5 G/DL (ref 31.4–37.4)
MCHC RBC AUTO-ENTMCNC: 32.7 G/DL (ref 31.4–37.4)
MCHC RBC AUTO-ENTMCNC: 33.7 G/DL (ref 31.4–37.4)
MCHC RBC AUTO-ENTMCNC: 33.8 G/DL (ref 31.4–37.4)
MCHC RBC AUTO-ENTMCNC: 34 G/DL (ref 31.4–37.4)
MCHC RBC AUTO-ENTMCNC: 34.8 G/DL (ref 31.4–37.4)
MCV RBC AUTO: 102 FL (ref 82–98)
MCV RBC AUTO: 103 FL (ref 82–98)
MCV RBC AUTO: 104 FL (ref 82–98)
MCV RBC AUTO: 90 FL (ref 82–98)
MCV RBC AUTO: 93 FL (ref 82–98)
MCV RBC AUTO: 94 FL (ref 82–98)
MCV RBC AUTO: 94 FL (ref 82–98)
MCV RBC AUTO: 95 FL (ref 82–98)
MCV RBC AUTO: 96 FL (ref 82–98)
MCV RBC AUTO: 97 FL (ref 82–98)
MCV RBC AUTO: 98 FL (ref 82–98)
MCV RBC AUTO: 98 FL (ref 82–98)
METAMYELOCYTES NFR BLD MANUAL: 3 % (ref 0–1)
MONOCYTES # BLD AUTO: 0.16 THOUSAND/UL (ref 0–1.22)
MONOCYTES # BLD AUTO: 0.21 THOUSAND/UL (ref 0–1.22)
MONOCYTES # BLD AUTO: 0.25 THOUSAND/ΜL (ref 0.17–1.22)
MONOCYTES # BLD AUTO: 0.32 THOUSAND/UL (ref 0–1.22)
MONOCYTES # BLD AUTO: 0.42 THOUSAND/ΜL (ref 0.17–1.22)
MONOCYTES # BLD AUTO: 0.42 THOUSAND/ΜL (ref 0.17–1.22)
MONOCYTES # BLD AUTO: 0.56 THOUSAND/ΜL (ref 0.17–1.22)
MONOCYTES # BLD AUTO: 2.94 THOUSAND/UL (ref 0–1.22)
MONOCYTES NFR BLD AUTO: 3 % (ref 4–12)
MONOCYTES NFR BLD AUTO: 5 % (ref 4–12)
MONOCYTES NFR BLD AUTO: 6 % (ref 4–12)
MONOCYTES NFR BLD AUTO: 6 % (ref 4–12)
MONOCYTES NFR BLD: 1 % (ref 4–12)
MONOCYTES NFR BLD: 17 % (ref 4–12)
MONOCYTES NFR BLD: 2 % (ref 4–12)
MONOCYTES NFR BLD: 3 % (ref 4–12)
MRSA NOSE QL CULT: NORMAL
MRSA NOSE QL CULT: NORMAL
MV E'TISSUE VEL-SEP: 8 CM/S
MV PEAK A VEL: 0.91 M/S
MV PEAK E VEL: 113 CM/S
MV STENOSIS PRESSURE HALF TIME: 53 MS
MV VALVE AREA P 1/2 METHOD: 4.15 CM2
NEUTROPHILS # BLD AUTO: 4.32 THOUSANDS/ΜL (ref 1.85–7.62)
NEUTROPHILS # BLD AUTO: 7.56 THOUSANDS/ΜL (ref 1.85–7.62)
NEUTROPHILS # BLD AUTO: 7.6 THOUSANDS/ΜL (ref 1.85–7.62)
NEUTROPHILS # BLD AUTO: 8.36 THOUSANDS/ΜL (ref 1.85–7.62)
NEUTROPHILS # BLD MANUAL: 13.47 THOUSAND/UL (ref 1.85–7.62)
NEUTROPHILS # BLD MANUAL: 14.33 THOUSAND/UL (ref 1.85–7.62)
NEUTROPHILS # BLD MANUAL: 8.18 THOUSAND/UL (ref 1.85–7.62)
NEUTROPHILS # BLD MANUAL: 9.91 THOUSAND/UL (ref 1.85–7.62)
NEUTS BAND NFR BLD MANUAL: 13 % (ref 0–8)
NEUTS BAND NFR BLD MANUAL: 14 % (ref 0–8)
NEUTS BAND NFR BLD MANUAL: 2 % (ref 0–8)
NEUTS BAND NFR BLD MANUAL: 7 % (ref 0–8)
NEUTS SEG NFR BLD AUTO: 65 % (ref 43–75)
NEUTS SEG NFR BLD AUTO: 71 % (ref 43–75)
NEUTS SEG NFR BLD AUTO: 74 % (ref 43–75)
NEUTS SEG NFR BLD AUTO: 75 % (ref 43–75)
NEUTS SEG NFR BLD AUTO: 80 % (ref 43–75)
NEUTS SEG NFR BLD AUTO: 82 % (ref 43–75)
NEUTS SEG NFR BLD AUTO: 86 % (ref 43–75)
NEUTS SEG NFR BLD AUTO: 87 % (ref 43–75)
NITRITE UR QL STRIP: NEGATIVE
NON-SQ EPI CELLS URNS QL MICRO: ABNORMAL /HPF
NRBC BLD AUTO-RTO: 0 /100 WBCS
NRBC BLD AUTO-RTO: 1 /100 WBC (ref 0–2)
NT-PROBNP SERPL-MCNC: ABNORMAL PG/ML
O2 CT BLDA-SCNC: 11.2 ML/DL (ref 16–23)
O2 CT BLDA-SCNC: 12 ML/DL (ref 16–23)
O2 CT BLDA-SCNC: 12.6 ML/DL (ref 16–23)
O2 CT BLDA-SCNC: 12.9 ML/DL (ref 16–23)
O2 CT BLDA-SCNC: 13 ML/DL (ref 16–23)
O2 CT BLDA-SCNC: 13 ML/DL (ref 16–23)
O2 CT BLDA-SCNC: 13.4 ML/DL (ref 16–23)
O2 CT BLDA-SCNC: 13.5 ML/DL (ref 16–23)
O2 CT BLDA-SCNC: 13.5 ML/DL (ref 16–23)
O2 CT BLDA-SCNC: 13.7 ML/DL (ref 16–23)
O2 CT BLDA-SCNC: 13.9 ML/DL (ref 16–23)
O2 CT BLDA-SCNC: 14.6 ML/DL (ref 16–23)
OXYHGB MFR BLDA: 82.1 % (ref 94–97)
OXYHGB MFR BLDA: 84.7 % (ref 94–97)
OXYHGB MFR BLDA: 86.6 % (ref 94–97)
OXYHGB MFR BLDA: 89.1 % (ref 94–97)
OXYHGB MFR BLDA: 89.4 % (ref 94–97)
OXYHGB MFR BLDA: 89.7 % (ref 94–97)
OXYHGB MFR BLDA: 90.9 % (ref 94–97)
OXYHGB MFR BLDA: 91.3 % (ref 94–97)
OXYHGB MFR BLDA: 92 % (ref 94–97)
OXYHGB MFR BLDA: 95.2 % (ref 94–97)
OXYHGB MFR BLDA: 95.5 % (ref 94–97)
OXYHGB MFR BLDA: 98.4 % (ref 94–97)
P AXIS: 54 DEGREES
P AXIS: 61 DEGREES
P AXIS: 64 DEGREES
P AXIS: 71 DEGREES
P AXIS: 72 DEGREES
PCO2 BLD: 26 MMOL/L (ref 21–32)
PCO2 BLD: 26 MMOL/L (ref 21–32)
PCO2 BLD: 46.4 MM HG (ref 36–44)
PCO2 BLD: 49.8 MM HG (ref 42–50)
PCO2 BLDA: 32.3 MM HG (ref 36–44)
PCO2 BLDA: 32.4 MM HG (ref 36–44)
PCO2 BLDA: 33.4 MM HG (ref 36–44)
PCO2 BLDA: 33.5 MM HG (ref 36–44)
PCO2 BLDA: 36.2 MM HG (ref 36–44)
PCO2 BLDA: 36.3 MM HG (ref 36–44)
PCO2 BLDA: 37.2 MM HG (ref 36–44)
PCO2 BLDA: 39.7 MM HG (ref 36–44)
PCO2 BLDA: 40.5 MM HG (ref 36–44)
PCO2 BLDA: 42.1 MM HG (ref 36–44)
PCO2 BLDA: 42.7 MM HG (ref 36–44)
PCO2 BLDA: 42.8 MM HG (ref 36–44)
PCO2 TEMP ADJ BLDA: 35.2 MM HG (ref 36–44)
PCO2 TEMP ADJ BLDA: 38 MM HG (ref 36–44)
PCO2 TEMP ADJ BLDA: 39 MM HG (ref 36–44)
PCO2 TEMP ADJ BLDA: 43.7 MM HG (ref 36–44)
PEEP RESPIRATORY: 10 CM[H2O]
PEEP RESPIRATORY: 16 CM[H2O]
PEEP RESPIRATORY: 16 CM[H2O]
PEEP RESPIRATORY: 6 CM[H2O]
PEEP RESPIRATORY: 8 CM[H2O]
PEEP RESPIRATORY: 8 CM[H2O]
PH BLD: 7.3 [PH] (ref 7.35–7.45)
PH BLD: 7.3 [PH] (ref 7.3–7.4)
PH BLD: 7.33 [PH] (ref 7.35–7.45)
PH BLD: 7.34 [PH] (ref 7.35–7.45)
PH BLD: 7.39 [PH] (ref 7.35–7.45)
PH BLD: 7.4 [PH] (ref 7.35–7.45)
PH BLDA: 7.33 [PH] (ref 7.35–7.45)
PH BLDA: 7.33 [PH] (ref 7.35–7.45)
PH BLDA: 7.34 [PH] (ref 7.35–7.45)
PH BLDA: 7.34 [PH] (ref 7.35–7.45)
PH BLDA: 7.35 [PH] (ref 7.35–7.45)
PH BLDA: 7.36 [PH] (ref 7.35–7.45)
PH BLDA: 7.36 [PH] (ref 7.35–7.45)
PH BLDA: 7.37 [PH] (ref 7.35–7.45)
PH BLDA: 7.37 [PH] (ref 7.35–7.45)
PH BLDA: 7.41 [PH] (ref 7.35–7.45)
PH BLDA: 7.41 [PH] (ref 7.35–7.45)
PH BLDA: 7.42 [PH] (ref 7.35–7.45)
PH UR STRIP.AUTO: 5.5 [PH]
PHOSPHATE SERPL-MCNC: 2.1 MG/DL (ref 2.3–4.1)
PHOSPHATE SERPL-MCNC: 2.3 MG/DL (ref 2.3–4.1)
PHOSPHATE SERPL-MCNC: 2.8 MG/DL (ref 2.3–4.1)
PHOSPHATE SERPL-MCNC: 2.8 MG/DL (ref 2.3–4.1)
PHOSPHATE SERPL-MCNC: 2.9 MG/DL (ref 2.3–4.1)
PHOSPHATE SERPL-MCNC: 3 MG/DL (ref 2.3–4.1)
PHOSPHATE SERPL-MCNC: 4.1 MG/DL (ref 2.3–4.1)
PHOSPHATE SERPL-MCNC: 4.4 MG/DL (ref 2.3–4.1)
PHOSPHATE SERPL-MCNC: 4.8 MG/DL (ref 2.3–4.1)
PHOSPHATE SERPL-MCNC: 5.9 MG/DL (ref 2.3–4.1)
PHOSPHATE SERPL-MCNC: 6.1 MG/DL (ref 2.3–4.1)
PLATELET # BLD AUTO: 104 THOUSANDS/UL (ref 149–390)
PLATELET # BLD AUTO: 105 THOUSANDS/UL (ref 149–390)
PLATELET # BLD AUTO: 107 THOUSANDS/UL (ref 149–390)
PLATELET # BLD AUTO: 114 THOUSANDS/UL (ref 149–390)
PLATELET # BLD AUTO: 119 THOUSANDS/UL (ref 149–390)
PLATELET # BLD AUTO: 125 THOUSANDS/UL (ref 149–390)
PLATELET # BLD AUTO: 138 THOUSANDS/UL (ref 149–390)
PLATELET # BLD AUTO: 140 THOUSANDS/UL (ref 149–390)
PLATELET # BLD AUTO: 141 THOUSANDS/UL (ref 149–390)
PLATELET # BLD AUTO: 143 THOUSANDS/UL (ref 149–390)
PLATELET # BLD AUTO: 173 THOUSANDS/UL (ref 149–390)
PLATELET # BLD AUTO: 236 THOUSANDS/UL (ref 149–390)
PLATELET # BLD AUTO: 242 THOUSANDS/UL (ref 149–390)
PLATELET # BLD AUTO: 244 THOUSANDS/UL (ref 149–390)
PLATELET BLD QL SMEAR: ABNORMAL
PLATELET BLD QL SMEAR: ABNORMAL
PLATELET BLD QL SMEAR: ADEQUATE
PLATELET BLD QL SMEAR: ADEQUATE
PMV BLD AUTO: 11.4 FL (ref 8.9–12.7)
PMV BLD AUTO: 11.7 FL (ref 8.9–12.7)
PMV BLD AUTO: 11.8 FL (ref 8.9–12.7)
PMV BLD AUTO: 11.9 FL (ref 8.9–12.7)
PMV BLD AUTO: 12.1 FL (ref 8.9–12.7)
PMV BLD AUTO: 12.1 FL (ref 8.9–12.7)
PMV BLD AUTO: 12.2 FL (ref 8.9–12.7)
PMV BLD AUTO: 12.4 FL (ref 8.9–12.7)
PMV BLD AUTO: 12.5 FL (ref 8.9–12.7)
PMV BLD AUTO: 12.6 FL (ref 8.9–12.7)
PMV BLD AUTO: 12.9 FL (ref 8.9–12.7)
PO2 BLD: 142.9 MM HG (ref 75–129)
PO2 BLD: 58 MM HG (ref 35–45)
PO2 BLD: 58 MM HG (ref 75–129)
PO2 BLD: 71.6 MM HG (ref 75–129)
PO2 BLD: 80.1 MM HG (ref 75–129)
PO2 BLD: 95.9 MM HG (ref 75–129)
PO2 BLDA: 139.8 MM HG (ref 75–129)
PO2 BLDA: 50.2 MM HG (ref 75–129)
PO2 BLDA: 54.3 MM HG (ref 75–129)
PO2 BLDA: 56.6 MM HG (ref 75–129)
PO2 BLDA: 59.1 MM HG (ref 75–129)
PO2 BLDA: 60.1 MM HG (ref 75–129)
PO2 BLDA: 61.1 MM HG (ref 75–129)
PO2 BLDA: 66 MM HG (ref 75–129)
PO2 BLDA: 68.2 MM HG (ref 75–129)
PO2 BLDA: 71.4 MM HG (ref 75–129)
PO2 BLDA: 83.1 MM HG (ref 75–129)
PO2 BLDA: 92.9 MM HG (ref 75–129)
POIKILOCYTOSIS BLD QL SMEAR: PRESENT
POIKILOCYTOSIS BLD QL SMEAR: PRESENT
POLYCHROMASIA BLD QL SMEAR: PRESENT
POLYCHROMASIA BLD QL SMEAR: PRESENT
POTASSIUM BLD-SCNC: 4.4 MMOL/L (ref 3.5–5.3)
POTASSIUM BLD-SCNC: 4.7 MMOL/L (ref 3.5–5.3)
POTASSIUM SERPL-SCNC: 3.4 MMOL/L (ref 3.5–5.3)
POTASSIUM SERPL-SCNC: 3.4 MMOL/L (ref 3.5–5.3)
POTASSIUM SERPL-SCNC: 3.6 MMOL/L (ref 3.5–5.3)
POTASSIUM SERPL-SCNC: 3.6 MMOL/L (ref 3.5–5.3)
POTASSIUM SERPL-SCNC: 3.9 MMOL/L (ref 3.5–5.3)
POTASSIUM SERPL-SCNC: 4 MMOL/L (ref 3.5–5.3)
POTASSIUM SERPL-SCNC: 4 MMOL/L (ref 3.5–5.3)
POTASSIUM SERPL-SCNC: 4.1 MMOL/L (ref 3.5–5.3)
POTASSIUM SERPL-SCNC: 4.2 MMOL/L (ref 3.5–5.3)
POTASSIUM SERPL-SCNC: 4.4 MMOL/L (ref 3.5–5.3)
POTASSIUM SERPL-SCNC: 4.7 MMOL/L (ref 3.5–5.3)
POTASSIUM SERPL-SCNC: 4.8 MMOL/L (ref 3.5–5.3)
POTASSIUM SERPL-SCNC: 4.9 MMOL/L (ref 3.5–5.3)
POTASSIUM SERPL-SCNC: 5 MMOL/L (ref 3.5–5.3)
POTASSIUM SERPL-SCNC: 5.4 MMOL/L (ref 3.5–5.3)
PR INTERVAL: 0 MS
PR INTERVAL: 117 MS
PR INTERVAL: 129 MS
PR INTERVAL: 133 MS
PR INTERVAL: 133 MS
PR INTERVAL: 150 MS
PRESSURE CONTROL: 14
PROCALCITONIN SERPL-MCNC: 11.05 NG/ML
PROCALCITONIN SERPL-MCNC: 24.16 NG/ML
PROCALCITONIN SERPL-MCNC: 26.07 NG/ML
PROCALCITONIN SERPL-MCNC: 8.19 NG/ML
PROCALCITONIN SERPL-MCNC: 8.55 NG/ML
PROCALCITONIN SERPL-MCNC: 8.62 NG/ML
PROT SERPL-MCNC: 11.6 G/DL (ref 6.4–8.2)
PROT UR STRIP-MCNC: ABNORMAL MG/DL
PROTHROMBIN TIME: 14.2 SECONDS (ref 11.6–14.5)
QRS AXIS: 29 DEGREES
QRS AXIS: 31 DEGREES
QRS AXIS: 32 DEGREES
QRS AXIS: 35 DEGREES
QRS AXIS: 38 DEGREES
QRS AXIS: 41 DEGREES
QRS AXIS: 46 DEGREES
QRS AXIS: 46 DEGREES
QRS AXIS: 47 DEGREES
QRSD INTERVAL: 88 MS
QRSD INTERVAL: 92 MS
QRSD INTERVAL: 92 MS
QRSD INTERVAL: 96 MS
QRSD INTERVAL: 96 MS
QT INTERVAL: 292 MS
QT INTERVAL: 296 MS
QT INTERVAL: 296 MS
QT INTERVAL: 304 MS
QT INTERVAL: 308 MS
QT INTERVAL: 358 MS
QT INTERVAL: 367 MS
QT INTERVAL: 392 MS
QT INTERVAL: 400 MS
QTC INTERVAL: 404 MS
QTC INTERVAL: 406 MS
QTC INTERVAL: 406 MS
QTC INTERVAL: 410 MS
QTC INTERVAL: 412 MS
QTC INTERVAL: 420 MS
QTC INTERVAL: 426 MS
QTC INTERVAL: 426 MS
QTC INTERVAL: 434 MS
RA PRESSURE ESTIMATED: 15 MMHG
RA PRESSURE ESTIMATED: 15 MMHG
RBC # BLD AUTO: 1.97 MILLION/UL (ref 3.81–5.12)
RBC # BLD AUTO: 2.06 MILLION/UL (ref 3.81–5.12)
RBC # BLD AUTO: 2.6 MILLION/UL (ref 3.81–5.12)
RBC # BLD AUTO: 2.7 MILLION/UL (ref 3.81–5.12)
RBC # BLD AUTO: 2.79 MILLION/UL (ref 3.81–5.12)
RBC # BLD AUTO: 2.83 MILLION/UL (ref 3.81–5.12)
RBC # BLD AUTO: 2.87 MILLION/UL (ref 3.81–5.12)
RBC # BLD AUTO: 2.92 MILLION/UL (ref 3.81–5.12)
RBC # BLD AUTO: 2.93 MILLION/UL (ref 3.81–5.12)
RBC # BLD AUTO: 2.98 MILLION/UL (ref 3.81–5.12)
RBC # BLD AUTO: 3.07 MILLION/UL (ref 3.81–5.12)
RBC # BLD AUTO: 3.1 MILLION/UL (ref 3.81–5.12)
RBC # BLD AUTO: 3.17 MILLION/UL (ref 3.81–5.12)
RBC # BLD AUTO: 3.18 MILLION/UL (ref 3.81–5.12)
RBC #/AREA URNS AUTO: ABNORMAL /HPF
RBC MORPH BLD: PRESENT
RH BLD: POSITIVE
RH BLD: POSITIVE
RIGHT ATRIAL 2D VOLUME: 32 ML
RIGHT ATRIUM AREA SYSTOLE A4C: 14.2 CM2
RIGHT VENTRICLE ID DIMENSION: 3 CM
RSV RNA RESP QL NAA+PROBE: NEGATIVE
RV PSP: 48 MMHG
RV PSP: 92 MMHG
SAO2 % BLD FROM PO2: 86 % (ref 60–85)
SAO2 % BLD FROM PO2: 87 % (ref 60–85)
SARS-COV-2 RNA RESP QL NAA+PROBE: NEGATIVE
SL CV LEFT ATRIUM LENGTH A2C: 5.2 CM
SL CV LV EF: 60
SL CV LV EF: 65
SL CV PED ECHO LEFT VENTRICLE DIASTOLIC VOLUME (MOD BIPLANE) 2D: 64 ML
SL CV PED ECHO LEFT VENTRICLE DIASTOLIC VOLUME (MOD BIPLANE) 2D: 66 ML
SL CV PED ECHO LEFT VENTRICLE SYSTOLIC VOLUME (MOD BIPLANE) 2D: 30 ML
SODIUM BLD-SCNC: 144 MMOL/L (ref 136–145)
SODIUM BLD-SCNC: 145 MMOL/L (ref 136–145)
SODIUM SERPL-SCNC: 136 MMOL/L (ref 136–145)
SODIUM SERPL-SCNC: 137 MMOL/L (ref 136–145)
SODIUM SERPL-SCNC: 138 MMOL/L (ref 136–145)
SODIUM SERPL-SCNC: 138 MMOL/L (ref 136–145)
SODIUM SERPL-SCNC: 139 MMOL/L (ref 136–145)
SODIUM SERPL-SCNC: 140 MMOL/L (ref 136–145)
SODIUM SERPL-SCNC: 142 MMOL/L (ref 136–145)
SODIUM SERPL-SCNC: 143 MMOL/L (ref 136–145)
SODIUM SERPL-SCNC: 143 MMOL/L (ref 136–145)
SODIUM SERPL-SCNC: 145 MMOL/L (ref 136–145)
SP GR UR STRIP.AUTO: 1.02 (ref 1–1.03)
SPECIMEN EXPIRATION DATE: NORMAL
SPECIMEN SOURCE: ABNORMAL
T WAVE AXIS: 102 DEGREES
T WAVE AXIS: 103 DEGREES
T WAVE AXIS: 49 DEGREES
T WAVE AXIS: 57 DEGREES
T WAVE AXIS: 61 DEGREES
T WAVE AXIS: 66 DEGREES
T WAVE AXIS: 79 DEGREES
T WAVE AXIS: 87 DEGREES
T WAVE AXIS: 93 DEGREES
TC PEEP: 36
TC PEEP: 36
TLOW: 0.5
TLOW: 0.5
TR MAX PG: 33 MMHG
TR MAX PG: 77 MMHG
TR PEAK VELOCITY: 2.9 M/S
TR PEAK VELOCITY: 4.4 M/S
TRICUSPID VALVE PEAK REGURGITATION VELOCITY: 2.87 M/S
TRICUSPID VALVE PEAK REGURGITATION VELOCITY: 4.76 M/S
TRIGL SERPL-MCNC: 180 MG/DL
UNIT DISPENSE STATUS: NORMAL
UNIT PRODUCT CODE: NORMAL
UNIT PRODUCT VOLUME: 300 ML
UNIT PRODUCT VOLUME: 350 ML
UNIT RH: NORMAL
UROBILINOGEN UR STRIP-ACNC: 3 MG/DL
VANCOMYCIN SERPL-MCNC: 25.5 UG/ML (ref 10–20)
VARIANT LYMPHS # BLD AUTO: 1 %
VENT AC: 14
VENT AC: 24
VENT APRV: 12
VENT APRV: 12
VENT TC: 100 %
VENT TC: 100 %
VENT- AC: AC
VENT- APRV: ABNORMAL
VENT- APRV: ABNORMAL
VENTRICULAR RATE: 104 BPM
VENTRICULAR RATE: 110 BPM
VENTRICULAR RATE: 113 BPM
VENTRICULAR RATE: 115 BPM
VENTRICULAR RATE: 121 BPM
VENTRICULAR RATE: 63 BPM
VENTRICULAR RATE: 71 BPM
VENTRICULAR RATE: 84 BPM
VENTRICULAR RATE: 85 BPM
VT SETTING VENT: 400 ML
WBC # BLD AUTO: 10.62 THOUSAND/UL (ref 4.31–10.16)
WBC # BLD AUTO: 10.66 THOUSAND/UL (ref 4.31–10.16)
WBC # BLD AUTO: 10.77 THOUSAND/UL (ref 4.31–10.16)
WBC # BLD AUTO: 14.59 THOUSAND/UL (ref 4.31–10.16)
WBC # BLD AUTO: 16.24 THOUSAND/UL (ref 4.31–10.16)
WBC # BLD AUTO: 16.28 THOUSAND/UL (ref 4.31–10.16)
WBC # BLD AUTO: 16.54 THOUSAND/UL (ref 4.31–10.16)
WBC # BLD AUTO: 17.27 THOUSAND/UL (ref 4.31–10.16)
WBC # BLD AUTO: 6.67 THOUSAND/UL (ref 4.31–10.16)
WBC # BLD AUTO: 8.7 THOUSAND/UL (ref 4.31–10.16)
WBC # BLD AUTO: 9.26 THOUSAND/UL (ref 4.31–10.16)
WBC # BLD AUTO: 9.29 THOUSAND/UL (ref 4.31–10.16)
WBC # BLD AUTO: 9.64 THOUSAND/UL (ref 4.31–10.16)
WBC # BLD AUTO: 9.91 THOUSAND/UL (ref 4.31–10.16)
WBC #/AREA URNS AUTO: ABNORMAL /HPF
Z-SCORE OF ASCENDING AORTA: 1.93
Z-SCORE OF INTERVENTRICULAR SEPTUM IN END DIASTOLE: 0.52
Z-SCORE OF INTERVENTRICULAR SEPTUM IN END DIASTOLE: 2.76
Z-SCORE OF LEFT VENTRICULAR DIMENSION IN END DIASTOLE: -2.69
Z-SCORE OF LEFT VENTRICULAR DIMENSION IN END DIASTOLE: -4.33
Z-SCORE OF LEFT VENTRICULAR DIMENSION IN END SYSTOLE: -0.93
Z-SCORE OF LEFT VENTRICULAR POSTERIOR WALL IN END DIASTOLE: 0.63
Z-SCORE OF LEFT VENTRICULAR POSTERIOR WALL IN END DIASTOLE: 2.87

## 2022-01-01 PROCEDURE — 72125 CT NECK SPINE W/O DYE: CPT

## 2022-01-01 PROCEDURE — 94640 AIRWAY INHALATION TREATMENT: CPT

## 2022-01-01 PROCEDURE — 80048 BASIC METABOLIC PNL TOTAL CA: CPT | Performed by: PHYSICIAN ASSISTANT

## 2022-01-01 PROCEDURE — 86920 COMPATIBILITY TEST SPIN: CPT

## 2022-01-01 PROCEDURE — NC001 PR NO CHARGE: Performed by: NURSE PRACTITIONER

## 2022-01-01 PROCEDURE — 72141 MRI NECK SPINE W/O DYE: CPT

## 2022-01-01 PROCEDURE — 85014 HEMATOCRIT: CPT | Performed by: PHYSICIAN ASSISTANT

## 2022-01-01 PROCEDURE — 80048 BASIC METABOLIC PNL TOTAL CA: CPT | Performed by: SURGERY

## 2022-01-01 PROCEDURE — 36226 PLACE CATH VERTEBRAL ART: CPT

## 2022-01-01 PROCEDURE — 30233N1 TRANSFUSION OF NONAUTOLOGOUS RED BLOOD CELLS INTO PERIPHERAL VEIN, PERCUTANEOUS APPROACH: ICD-10-PCS | Performed by: SURGERY

## 2022-01-01 PROCEDURE — 81001 URINALYSIS AUTO W/SCOPE: CPT | Performed by: PHYSICIAN ASSISTANT

## 2022-01-01 PROCEDURE — 85018 HEMOGLOBIN: CPT | Performed by: PHYSICIAN ASSISTANT

## 2022-01-01 PROCEDURE — 85027 COMPLETE CBC AUTOMATED: CPT | Performed by: PHYSICIAN ASSISTANT

## 2022-01-01 PROCEDURE — 93010 ELECTROCARDIOGRAM REPORT: CPT | Performed by: INTERNAL MEDICINE

## 2022-01-01 PROCEDURE — 84100 ASSAY OF PHOSPHORUS: CPT | Performed by: PHYSICIAN ASSISTANT

## 2022-01-01 PROCEDURE — 85027 COMPLETE CBC AUTOMATED: CPT | Performed by: SURGERY

## 2022-01-01 PROCEDURE — 87186 SC STD MICRODIL/AGAR DIL: CPT | Performed by: PHYSICIAN ASSISTANT

## 2022-01-01 PROCEDURE — 84132 ASSAY OF SERUM POTASSIUM: CPT

## 2022-01-01 PROCEDURE — 70496 CT ANGIOGRAPHY HEAD: CPT

## 2022-01-01 PROCEDURE — 87186 SC STD MICRODIL/AGAR DIL: CPT | Performed by: SURGERY

## 2022-01-01 PROCEDURE — 84295 ASSAY OF SERUM SODIUM: CPT

## 2022-01-01 PROCEDURE — B31NYZZ FLUOROSCOPY OF OTHER UPPER ARTERIES USING OTHER CONTRAST: ICD-10-PCS | Performed by: NEUROLOGICAL SURGERY

## 2022-01-01 PROCEDURE — 36225 PLACE CATH SUBCLAVIAN ART: CPT

## 2022-01-01 PROCEDURE — 94644 CONT INHLJ TX 1ST HOUR: CPT

## 2022-01-01 PROCEDURE — 36415 COLL VENOUS BLD VENIPUNCTURE: CPT | Performed by: EMERGENCY MEDICINE

## 2022-01-01 PROCEDURE — 87116 MYCOBACTERIA CULTURE: CPT | Performed by: SURGERY

## 2022-01-01 PROCEDURE — 99232 SBSQ HOSP IP/OBS MODERATE 35: CPT | Performed by: NEUROLOGICAL SURGERY

## 2022-01-01 PROCEDURE — B31GYZZ FLUOROSCOPY OF BILATERAL VERTEBRAL ARTERIES USING OTHER CONTRAST: ICD-10-PCS | Performed by: NEUROLOGICAL SURGERY

## 2022-01-01 PROCEDURE — 99291 CRITICAL CARE FIRST HOUR: CPT | Performed by: EMERGENCY MEDICINE

## 2022-01-01 PROCEDURE — C1894 INTRO/SHEATH, NON-LASER: HCPCS

## 2022-01-01 PROCEDURE — 85025 COMPLETE CBC W/AUTO DIFF WBC: CPT

## 2022-01-01 PROCEDURE — P9016 RBC LEUKOCYTES REDUCED: HCPCS

## 2022-01-01 PROCEDURE — 75774 ARTERY X-RAY EACH VESSEL: CPT | Performed by: NEUROLOGICAL SURGERY

## 2022-01-01 PROCEDURE — 93005 ELECTROCARDIOGRAM TRACING: CPT

## 2022-01-01 PROCEDURE — B319YZZ FLUOROSCOPY OF RIGHT EXTERNAL CAROTID ARTERY USING OTHER CONTRAST: ICD-10-PCS | Performed by: NEUROLOGICAL SURGERY

## 2022-01-01 PROCEDURE — 36223 PLACE CATH CAROTID/INOM ART: CPT

## 2022-01-01 PROCEDURE — 96374 THER/PROPH/DIAG INJ IV PUSH: CPT

## 2022-01-01 PROCEDURE — 82805 BLOOD GASES W/O2 SATURATION: CPT

## 2022-01-01 PROCEDURE — 71045 X-RAY EXAM CHEST 1 VIEW: CPT

## 2022-01-01 PROCEDURE — 83735 ASSAY OF MAGNESIUM: CPT | Performed by: PHYSICIAN ASSISTANT

## 2022-01-01 PROCEDURE — NC001 PR NO CHARGE: Performed by: SURGERY

## 2022-01-01 PROCEDURE — 93306 TTE W/DOPPLER COMPLETE: CPT | Performed by: INTERNAL MEDICINE

## 2022-01-01 PROCEDURE — 84145 PROCALCITONIN (PCT): CPT | Performed by: PHYSICIAN ASSISTANT

## 2022-01-01 PROCEDURE — 0B948ZZ DRAINAGE OF RIGHT UPPER LOBE BRONCHUS, VIA NATURAL OR ARTIFICIAL OPENING ENDOSCOPIC: ICD-10-PCS | Performed by: SURGERY

## 2022-01-01 PROCEDURE — 93306 TTE W/DOPPLER COMPLETE: CPT

## 2022-01-01 PROCEDURE — 94760 N-INVAS EAR/PLS OXIMETRY 1: CPT

## 2022-01-01 PROCEDURE — G1004 CDSM NDSC: HCPCS

## 2022-01-01 PROCEDURE — 80202 ASSAY OF VANCOMYCIN: CPT | Performed by: SURGERY

## 2022-01-01 PROCEDURE — 82948 REAGENT STRIP/BLOOD GLUCOSE: CPT

## 2022-01-01 PROCEDURE — B310YZZ FLUOROSCOPY OF THORACIC AORTA USING OTHER CONTRAST: ICD-10-PCS | Performed by: NEUROLOGICAL SURGERY

## 2022-01-01 PROCEDURE — 80048 BASIC METABOLIC PNL TOTAL CA: CPT | Performed by: NURSE PRACTITIONER

## 2022-01-01 PROCEDURE — 82805 BLOOD GASES W/O2 SATURATION: CPT | Performed by: SURGERY

## 2022-01-01 PROCEDURE — C1887 CATHETER, GUIDING: HCPCS

## 2022-01-01 PROCEDURE — 99291 CRITICAL CARE FIRST HOUR: CPT | Performed by: SURGERY

## 2022-01-01 PROCEDURE — 87106 FUNGI IDENTIFICATION YEAST: CPT | Performed by: SURGERY

## 2022-01-01 PROCEDURE — 99233 SBSQ HOSP IP/OBS HIGH 50: CPT | Performed by: NEUROLOGICAL SURGERY

## 2022-01-01 PROCEDURE — 94003 VENT MGMT INPAT SUBQ DAY: CPT

## 2022-01-01 PROCEDURE — 96365 THER/PROPH/DIAG IV INF INIT: CPT

## 2022-01-01 PROCEDURE — 99223 1ST HOSP IP/OBS HIGH 75: CPT | Performed by: INTERNAL MEDICINE

## 2022-01-01 PROCEDURE — 94645 CONT INHLJ TX EACH ADDL HOUR: CPT | Performed by: SOCIAL WORKER

## 2022-01-01 PROCEDURE — 06HY33Z INSERTION OF INFUSION DEVICE INTO LOWER VEIN, PERCUTANEOUS APPROACH: ICD-10-PCS

## 2022-01-01 PROCEDURE — 87081 CULTURE SCREEN ONLY: CPT | Performed by: PHYSICIAN ASSISTANT

## 2022-01-01 PROCEDURE — 80048 BASIC METABOLIC PNL TOTAL CA: CPT

## 2022-01-01 PROCEDURE — 85007 BL SMEAR W/DIFF WBC COUNT: CPT | Performed by: PHYSICIAN ASSISTANT

## 2022-01-01 PROCEDURE — 99446 NTRPROF PH1/NTRNET/EHR 5-10: CPT | Performed by: RADIOLOGY

## 2022-01-01 PROCEDURE — NC001 PR NO CHARGE: Performed by: NEUROLOGICAL SURGERY

## 2022-01-01 PROCEDURE — 96375 TX/PRO/DX INJ NEW DRUG ADDON: CPT

## 2022-01-01 PROCEDURE — 82805 BLOOD GASES W/O2 SATURATION: CPT | Performed by: NURSE PRACTITIONER

## 2022-01-01 PROCEDURE — 74177 CT ABD & PELVIS W/CONTRAST: CPT

## 2022-01-01 PROCEDURE — 85027 COMPLETE CBC AUTOMATED: CPT | Performed by: NURSE PRACTITIONER

## 2022-01-01 PROCEDURE — 31600 PLANNED TRACHEOSTOMY: CPT | Performed by: SURGERY

## 2022-01-01 PROCEDURE — 87077 CULTURE AEROBIC IDENTIFY: CPT | Performed by: PHYSICIAN ASSISTANT

## 2022-01-01 PROCEDURE — 83735 ASSAY OF MAGNESIUM: CPT | Performed by: SURGERY

## 2022-01-01 PROCEDURE — 93325 DOPPLER ECHO COLOR FLOW MAPG: CPT | Performed by: INTERNAL MEDICINE

## 2022-01-01 PROCEDURE — 94669 MECHANICAL CHEST WALL OSCILL: CPT

## 2022-01-01 PROCEDURE — 87206 SMEAR FLUORESCENT/ACID STAI: CPT | Performed by: SURGERY

## 2022-01-01 PROCEDURE — 83735 ASSAY OF MAGNESIUM: CPT

## 2022-01-01 PROCEDURE — 36222 PLACE CATH CAROTID/INOM ART: CPT

## 2022-01-01 PROCEDURE — 86900 BLOOD TYPING SEROLOGIC ABO: CPT | Performed by: PHYSICIAN ASSISTANT

## 2022-01-01 PROCEDURE — 76604 US EXAM CHEST: CPT | Performed by: SURGERY

## 2022-01-01 PROCEDURE — 85014 HEMATOCRIT: CPT

## 2022-01-01 PROCEDURE — 0BH18EZ INSERTION OF ENDOTRACHEAL AIRWAY INTO TRACHEA, VIA NATURAL OR ARTIFICIAL OPENING ENDOSCOPIC: ICD-10-PCS | Performed by: SURGERY

## 2022-01-01 PROCEDURE — 85027 COMPLETE CBC AUTOMATED: CPT

## 2022-01-01 PROCEDURE — 87077 CULTURE AEROBIC IDENTIFY: CPT | Performed by: SURGERY

## 2022-01-01 PROCEDURE — 99205 OFFICE O/P NEW HI 60 MIN: CPT | Performed by: EMERGENCY MEDICINE

## 2022-01-01 PROCEDURE — 0241U HB NFCT DS VIR RESP RNA 4 TRGT: CPT | Performed by: EMERGENCY MEDICINE

## 2022-01-01 PROCEDURE — 36221 PLACE CATH THORACIC AORTA: CPT

## 2022-01-01 PROCEDURE — 87070 CULTURE OTHR SPECIMN AEROBIC: CPT | Performed by: PHYSICIAN ASSISTANT

## 2022-01-01 PROCEDURE — 84145 PROCALCITONIN (PCT): CPT | Performed by: NURSE PRACTITIONER

## 2022-01-01 PROCEDURE — 82805 BLOOD GASES W/O2 SATURATION: CPT | Performed by: EMERGENCY MEDICINE

## 2022-01-01 PROCEDURE — 36218 PLACE CATHETER IN ARTERY: CPT | Performed by: NEUROLOGICAL SURGERY

## 2022-01-01 PROCEDURE — 82330 ASSAY OF CALCIUM: CPT | Performed by: PHYSICIAN ASSISTANT

## 2022-01-01 PROCEDURE — 82805 BLOOD GASES W/O2 SATURATION: CPT | Performed by: PHYSICIAN ASSISTANT

## 2022-01-01 PROCEDURE — 85007 BL SMEAR W/DIFF WBC COUNT: CPT

## 2022-01-01 PROCEDURE — 93308 TTE F-UP OR LMTD: CPT | Performed by: INTERNAL MEDICINE

## 2022-01-01 PROCEDURE — 84484 ASSAY OF TROPONIN QUANT: CPT | Performed by: EMERGENCY MEDICINE

## 2022-01-01 PROCEDURE — C1769 GUIDE WIRE: HCPCS

## 2022-01-01 PROCEDURE — 87070 CULTURE OTHR SPECIMN AEROBIC: CPT | Performed by: SURGERY

## 2022-01-01 PROCEDURE — 85025 COMPLETE CBC W/AUTO DIFF WBC: CPT | Performed by: EMERGENCY MEDICINE

## 2022-01-01 PROCEDURE — 82947 ASSAY GLUCOSE BLOOD QUANT: CPT

## 2022-01-01 PROCEDURE — 99285 EMERGENCY DEPT VISIT HI MDM: CPT | Performed by: EMERGENCY MEDICINE

## 2022-01-01 PROCEDURE — 83605 ASSAY OF LACTIC ACID: CPT | Performed by: PHYSICIAN ASSISTANT

## 2022-01-01 PROCEDURE — 4A133B1 MONITORING OF ARTERIAL PRESSURE, PERIPHERAL, PERCUTANEOUS APPROACH: ICD-10-PCS | Performed by: ANESTHESIOLOGY

## 2022-01-01 PROCEDURE — 84100 ASSAY OF PHOSPHORUS: CPT | Performed by: NURSE PRACTITIONER

## 2022-01-01 PROCEDURE — 94760 N-INVAS EAR/PLS OXIMETRY 1: CPT | Performed by: SOCIAL WORKER

## 2022-01-01 PROCEDURE — 93970 EXTREMITY STUDY: CPT | Performed by: SURGERY

## 2022-01-01 PROCEDURE — 87252 VIRUS INOCULATION TISSUE: CPT | Performed by: SURGERY

## 2022-01-01 PROCEDURE — 82330 ASSAY OF CALCIUM: CPT

## 2022-01-01 PROCEDURE — 87205 SMEAR GRAM STAIN: CPT | Performed by: PHYSICIAN ASSISTANT

## 2022-01-01 PROCEDURE — 94002 VENT MGMT INPAT INIT DAY: CPT

## 2022-01-01 PROCEDURE — 93308 TTE F-UP OR LMTD: CPT

## 2022-01-01 PROCEDURE — 84100 ASSAY OF PHOSPHORUS: CPT | Performed by: SURGERY

## 2022-01-01 PROCEDURE — 94645 CONT INHLJ TX EACH ADDL HOUR: CPT

## 2022-01-01 PROCEDURE — 86850 RBC ANTIBODY SCREEN: CPT | Performed by: PHYSICIAN ASSISTANT

## 2022-01-01 PROCEDURE — 0B978ZZ DRAINAGE OF LEFT MAIN BRONCHUS, VIA NATURAL OR ARTIFICIAL OPENING ENDOSCOPIC: ICD-10-PCS | Performed by: SURGERY

## 2022-01-01 PROCEDURE — 70498 CT ANGIOGRAPHY NECK: CPT

## 2022-01-01 PROCEDURE — 71275 CT ANGIOGRAPHY CHEST: CPT

## 2022-01-01 PROCEDURE — 36223 PLACE CATH CAROTID/INOM ART: CPT | Performed by: NEUROLOGICAL SURGERY

## 2022-01-01 PROCEDURE — 83735 ASSAY OF MAGNESIUM: CPT | Performed by: NURSE PRACTITIONER

## 2022-01-01 PROCEDURE — 84100 ASSAY OF PHOSPHORUS: CPT

## 2022-01-01 PROCEDURE — 82803 BLOOD GASES ANY COMBINATION: CPT

## 2022-01-01 PROCEDURE — 0B110F4 BYPASS TRACHEA TO CUTANEOUS WITH TRACHEOSTOMY DEVICE, OPEN APPROACH: ICD-10-PCS | Performed by: SURGERY

## 2022-01-01 PROCEDURE — C1760 CLOSURE DEV, VASC: HCPCS

## 2022-01-01 PROCEDURE — 03HY32Z INSERTION OF MONITORING DEVICE INTO UPPER ARTERY, PERCUTANEOUS APPROACH: ICD-10-PCS | Performed by: ANESTHESIOLOGY

## 2022-01-01 PROCEDURE — 85397 CLOTTING FUNCT ACTIVITY: CPT | Performed by: STUDENT IN AN ORGANIZED HEALTH CARE EDUCATION/TRAINING PROGRAM

## 2022-01-01 PROCEDURE — 87040 BLOOD CULTURE FOR BACTERIA: CPT | Performed by: PHYSICIAN ASSISTANT

## 2022-01-01 PROCEDURE — 85025 COMPLETE CBC W/AUTO DIFF WBC: CPT | Performed by: NURSE PRACTITIONER

## 2022-01-01 PROCEDURE — 87184 SC STD DISK METHOD PER PLATE: CPT | Performed by: PHYSICIAN ASSISTANT

## 2022-01-01 PROCEDURE — 99221 1ST HOSP IP/OBS SF/LOW 40: CPT | Performed by: NEUROLOGICAL SURGERY

## 2022-01-01 PROCEDURE — 93321 DOPPLER ECHO F-UP/LMTD STD: CPT | Performed by: INTERNAL MEDICINE

## 2022-01-01 PROCEDURE — 87102 FUNGUS ISOLATION CULTURE: CPT | Performed by: SURGERY

## 2022-01-01 PROCEDURE — 86901 BLOOD TYPING SEROLOGIC RH(D): CPT | Performed by: PHYSICIAN ASSISTANT

## 2022-01-01 PROCEDURE — 99292 CRITICAL CARE ADDL 30 MIN: CPT | Performed by: SURGERY

## 2022-01-01 PROCEDURE — 85025 COMPLETE CBC W/AUTO DIFF WBC: CPT | Performed by: SURGERY

## 2022-01-01 PROCEDURE — 31624 DX BRONCHOSCOPE/LAVAGE: CPT | Performed by: SURGERY

## 2022-01-01 PROCEDURE — 93308 TTE F-UP OR LMTD: CPT | Performed by: SURGERY

## 2022-01-01 PROCEDURE — 85730 THROMBOPLASTIN TIME PARTIAL: CPT | Performed by: EMERGENCY MEDICINE

## 2022-01-01 PROCEDURE — 71260 CT THORAX DX C+: CPT

## 2022-01-01 PROCEDURE — 36226 PLACE CATH VERTEBRAL ART: CPT | Performed by: NEUROLOGICAL SURGERY

## 2022-01-01 PROCEDURE — 0B968ZZ DRAINAGE OF RIGHT LOWER LOBE BRONCHUS, VIA NATURAL OR ARTIFICIAL OPENING ENDOSCOPIC: ICD-10-PCS | Performed by: SURGERY

## 2022-01-01 PROCEDURE — 4A133J1 MONITORING OF ARTERIAL PULSE, PERIPHERAL, PERCUTANEOUS APPROACH: ICD-10-PCS | Performed by: ANESTHESIOLOGY

## 2022-01-01 PROCEDURE — 84478 ASSAY OF TRIGLYCERIDES: CPT | Performed by: PHYSICIAN ASSISTANT

## 2022-01-01 PROCEDURE — 36556 INSERT NON-TUNNEL CV CATH: CPT | Performed by: SURGERY

## 2022-01-01 PROCEDURE — 83880 ASSAY OF NATRIURETIC PEPTIDE: CPT | Performed by: EMERGENCY MEDICINE

## 2022-01-01 PROCEDURE — 70450 CT HEAD/BRAIN W/O DYE: CPT

## 2022-01-01 PROCEDURE — 36227 PLACE CATH XTRNL CAROTID: CPT

## 2022-01-01 PROCEDURE — 36228 PLACE CATH INTRACRANIAL ART: CPT

## 2022-01-01 PROCEDURE — 99285 EMERGENCY DEPT VISIT HI MDM: CPT

## 2022-01-01 PROCEDURE — 36227 PLACE CATH XTRNL CAROTID: CPT | Performed by: NEUROLOGICAL SURGERY

## 2022-01-01 PROCEDURE — 31500 INSERT EMERGENCY AIRWAY: CPT | Performed by: SURGERY

## 2022-01-01 PROCEDURE — 82330 ASSAY OF CALCIUM: CPT | Performed by: NURSE PRACTITIONER

## 2022-01-01 PROCEDURE — 85576 BLOOD PLATELET AGGREGATION: CPT | Performed by: STUDENT IN AN ORGANIZED HEALTH CARE EDUCATION/TRAINING PROGRAM

## 2022-01-01 PROCEDURE — 0B988ZZ DRAINAGE OF LEFT UPPER LOBE BRONCHUS, VIA NATURAL OR ARTIFICIAL OPENING ENDOSCOPIC: ICD-10-PCS | Performed by: SURGERY

## 2022-01-01 PROCEDURE — B315YZZ FLUOROSCOPY OF BILATERAL COMMON CAROTID ARTERIES USING OTHER CONTRAST: ICD-10-PCS | Performed by: NEUROLOGICAL SURGERY

## 2022-01-01 PROCEDURE — 76705 ECHO EXAM OF ABDOMEN: CPT

## 2022-01-01 PROCEDURE — 93970 EXTREMITY STUDY: CPT

## 2022-01-01 PROCEDURE — 0B9B8ZZ DRAINAGE OF LEFT LOWER LOBE BRONCHUS, VIA NATURAL OR ARTIFICIAL OPENING ENDOSCOPIC: ICD-10-PCS | Performed by: SURGERY

## 2022-01-01 PROCEDURE — 87185 SC STD ENZYME DETCJ PER NZM: CPT | Performed by: PHYSICIAN ASSISTANT

## 2022-01-01 PROCEDURE — 0B938ZZ DRAINAGE OF RIGHT MAIN BRONCHUS, VIA NATURAL OR ARTIFICIAL OPENING ENDOSCOPIC: ICD-10-PCS | Performed by: SURGERY

## 2022-01-01 PROCEDURE — 93325 DOPPLER ECHO COLOR FLOW MAPG: CPT

## 2022-01-01 PROCEDURE — 85384 FIBRINOGEN ACTIVITY: CPT | Performed by: STUDENT IN AN ORGANIZED HEALTH CARE EDUCATION/TRAINING PROGRAM

## 2022-01-01 PROCEDURE — 93321 DOPPLER ECHO F-UP/LMTD STD: CPT

## 2022-01-01 PROCEDURE — 80053 COMPREHEN METABOLIC PANEL: CPT | Performed by: EMERGENCY MEDICINE

## 2022-01-01 PROCEDURE — 85610 PROTHROMBIN TIME: CPT | Performed by: EMERGENCY MEDICINE

## 2022-01-01 PROCEDURE — B311YZZ FLUOROSCOPY OF RIGHT BRACHIOCEPHALIC-SUBCLAVIAN ARTERY USING OTHER CONTRAST: ICD-10-PCS | Performed by: NEUROLOGICAL SURGERY

## 2022-01-01 PROCEDURE — 85347 COAGULATION TIME ACTIVATED: CPT | Performed by: STUDENT IN AN ORGANIZED HEALTH CARE EDUCATION/TRAINING PROGRAM

## 2022-01-01 PROCEDURE — 82805 BLOOD GASES W/O2 SATURATION: CPT | Performed by: STUDENT IN AN ORGANIZED HEALTH CARE EDUCATION/TRAINING PROGRAM

## 2022-01-01 PROCEDURE — 5A1955Z RESPIRATORY VENTILATION, GREATER THAN 96 CONSECUTIVE HOURS: ICD-10-PCS | Performed by: SURGERY

## 2022-01-01 RX ORDER — IPRATROPIUM BROMIDE AND ALBUTEROL SULFATE 2.5; .5 MG/3ML; MG/3ML
3 SOLUTION RESPIRATORY (INHALATION) ONCE
Status: COMPLETED | OUTPATIENT
Start: 2022-01-01 | End: 2022-01-01

## 2022-01-01 RX ORDER — MINERAL OIL AND PETROLATUM 150; 830 MG/G; MG/G
OINTMENT OPHTHALMIC
Status: DISCONTINUED | OUTPATIENT
Start: 2022-01-01 | End: 2022-01-01

## 2022-01-01 RX ORDER — FENTANYL CITRATE 50 UG/ML
100 INJECTION, SOLUTION INTRAMUSCULAR; INTRAVENOUS ONCE
Status: COMPLETED | OUTPATIENT
Start: 2022-01-01 | End: 2022-01-01

## 2022-01-01 RX ORDER — GLYCOPYRROLATE 0.2 MG/ML
0.1 INJECTION INTRAMUSCULAR; INTRAVENOUS
Status: DISCONTINUED | OUTPATIENT
Start: 2022-01-01 | End: 2022-01-01 | Stop reason: HOSPADM

## 2022-01-01 RX ORDER — HYDRALAZINE HYDROCHLORIDE 20 MG/ML
10 INJECTION INTRAMUSCULAR; INTRAVENOUS ONCE
Status: COMPLETED | OUTPATIENT
Start: 2022-01-01 | End: 2022-01-01

## 2022-01-01 RX ORDER — MIDAZOLAM HYDROCHLORIDE 2 MG/2ML
2 INJECTION, SOLUTION INTRAMUSCULAR; INTRAVENOUS ONCE
Status: COMPLETED | OUTPATIENT
Start: 2022-01-01 | End: 2022-01-01

## 2022-01-01 RX ORDER — PROPOFOL 10 MG/ML
5-50 INJECTION, EMULSION INTRAVENOUS
Status: DISCONTINUED | OUTPATIENT
Start: 2022-01-01 | End: 2022-01-01

## 2022-01-01 RX ORDER — LABETALOL 20 MG/4 ML (5 MG/ML) INTRAVENOUS SYRINGE
10 EVERY 4 HOURS PRN
Status: DISCONTINUED | OUTPATIENT
Start: 2022-01-01 | End: 2022-01-01

## 2022-01-01 RX ORDER — CHLORHEXIDINE GLUCONATE 0.12 MG/ML
15 RINSE ORAL ONCE
Status: COMPLETED | OUTPATIENT
Start: 2022-01-01 | End: 2022-01-01

## 2022-01-01 RX ORDER — LOSARTAN POTASSIUM 50 MG/1
50 TABLET ORAL DAILY
Status: DISCONTINUED | OUTPATIENT
Start: 2022-01-01 | End: 2022-01-01

## 2022-01-01 RX ORDER — ACETAMINOPHEN 160 MG/5ML
975 SUSPENSION, ORAL (FINAL DOSE FORM) ORAL EVERY 6 HOURS
Status: DISCONTINUED | OUTPATIENT
Start: 2022-01-01 | End: 2022-01-01

## 2022-01-01 RX ORDER — POTASSIUM CHLORIDE 20MEQ/15ML
40 LIQUID (ML) ORAL ONCE
Status: COMPLETED | OUTPATIENT
Start: 2022-01-01 | End: 2022-01-01

## 2022-01-01 RX ORDER — SODIUM CHLORIDE, SODIUM GLUCONATE, SODIUM ACETATE, POTASSIUM CHLORIDE, MAGNESIUM CHLORIDE, SODIUM PHOSPHATE, DIBASIC, AND POTASSIUM PHOSPHATE .53; .5; .37; .037; .03; .012; .00082 G/100ML; G/100ML; G/100ML; G/100ML; G/100ML; G/100ML; G/100ML
500 INJECTION, SOLUTION INTRAVENOUS ONCE
Status: COMPLETED | OUTPATIENT
Start: 2022-01-01 | End: 2022-01-01

## 2022-01-01 RX ORDER — AMOXICILLIN 250 MG
1 CAPSULE ORAL
Status: DISCONTINUED | OUTPATIENT
Start: 2022-01-01 | End: 2022-01-01

## 2022-01-01 RX ORDER — LIDOCAINE HYDROCHLORIDE 10 MG/ML
INJECTION, SOLUTION EPIDURAL; INFILTRATION; INTRACAUDAL; PERINEURAL
Status: COMPLETED
Start: 2022-01-01 | End: 2022-01-01

## 2022-01-01 RX ORDER — HALOPERIDOL 5 MG/ML
0.5 INJECTION INTRAMUSCULAR EVERY 2 HOUR PRN
Status: DISCONTINUED | OUTPATIENT
Start: 2022-01-01 | End: 2022-01-01 | Stop reason: HOSPADM

## 2022-01-01 RX ORDER — HYDROMORPHONE HCL/PF 1 MG/ML
0.5 SYRINGE (ML) INJECTION EVERY 4 HOURS PRN
Status: DISCONTINUED | OUTPATIENT
Start: 2022-01-01 | End: 2022-01-01

## 2022-01-01 RX ORDER — LIDOCAINE HYDROCHLORIDE 40 MG/ML
5 SOLUTION TOPICAL ONCE
Status: DISCONTINUED | OUTPATIENT
Start: 2022-01-01 | End: 2022-01-01

## 2022-01-01 RX ORDER — HEPARIN SODIUM 5000 [USP'U]/ML
5000 INJECTION, SOLUTION INTRAVENOUS; SUBCUTANEOUS EVERY 8 HOURS SCHEDULED
Status: DISCONTINUED | OUTPATIENT
Start: 2022-01-01 | End: 2022-01-01

## 2022-01-01 RX ORDER — POTASSIUM CHLORIDE 14.9 MG/ML
20 INJECTION INTRAVENOUS
Status: COMPLETED | OUTPATIENT
Start: 2022-01-01 | End: 2022-01-01

## 2022-01-01 RX ORDER — ACETAMINOPHEN 325 MG/1
975 TABLET ORAL EVERY 8 HOURS SCHEDULED
Status: DISCONTINUED | OUTPATIENT
Start: 2022-01-01 | End: 2022-01-01

## 2022-01-01 RX ORDER — DEXAMETHASONE SODIUM PHOSPHATE 4 MG/ML
8 INJECTION, SOLUTION INTRA-ARTICULAR; INTRALESIONAL; INTRAMUSCULAR; INTRAVENOUS; SOFT TISSUE ONCE
Status: COMPLETED | OUTPATIENT
Start: 2022-01-01 | End: 2022-01-01

## 2022-01-01 RX ORDER — FENTANYL CITRATE-0.9 % NACL/PF 10 MCG/ML
100 PLASTIC BAG, INJECTION (ML) INTRAVENOUS CONTINUOUS
Status: DISCONTINUED | OUTPATIENT
Start: 2022-01-01 | End: 2022-01-01

## 2022-01-01 RX ORDER — LORAZEPAM 2 MG/ML
1 INJECTION INTRAMUSCULAR
Status: DISCONTINUED | OUTPATIENT
Start: 2022-01-01 | End: 2022-01-01 | Stop reason: HOSPADM

## 2022-01-01 RX ORDER — ACETAMINOPHEN 160 MG/5ML
325 SUSPENSION, ORAL (FINAL DOSE FORM) ORAL ONCE
Status: COMPLETED | OUTPATIENT
Start: 2022-01-01 | End: 2022-01-01

## 2022-01-01 RX ORDER — SODIUM CHLORIDE, SODIUM LACTATE, POTASSIUM CHLORIDE, CALCIUM CHLORIDE 600; 310; 30; 20 MG/100ML; MG/100ML; MG/100ML; MG/100ML
125 INJECTION, SOLUTION INTRAVENOUS CONTINUOUS
Status: DISCONTINUED | OUTPATIENT
Start: 2022-01-01 | End: 2022-01-01

## 2022-01-01 RX ORDER — ACETAMINOPHEN 160 MG/5ML
650 SUSPENSION, ORAL (FINAL DOSE FORM) ORAL EVERY 6 HOURS PRN
Status: DISCONTINUED | OUTPATIENT
Start: 2022-01-01 | End: 2022-01-01

## 2022-01-01 RX ORDER — ALBUMIN, HUMAN INJ 5% 5 %
12.5 SOLUTION INTRAVENOUS ONCE
Status: COMPLETED | OUTPATIENT
Start: 2022-01-01 | End: 2022-01-01

## 2022-01-01 RX ORDER — POLYETHYLENE GLYCOL 3350 17 G/17G
17 POWDER, FOR SOLUTION ORAL DAILY PRN
Status: DISCONTINUED | OUTPATIENT
Start: 2022-01-01 | End: 2022-01-01

## 2022-01-01 RX ORDER — SODIUM CHLORIDE, SODIUM GLUCONATE, SODIUM ACETATE, POTASSIUM CHLORIDE, MAGNESIUM CHLORIDE, SODIUM PHOSPHATE, DIBASIC, AND POTASSIUM PHOSPHATE .53; .5; .37; .037; .03; .012; .00082 G/100ML; G/100ML; G/100ML; G/100ML; G/100ML; G/100ML; G/100ML
125 INJECTION, SOLUTION INTRAVENOUS CONTINUOUS
Status: DISCONTINUED | OUTPATIENT
Start: 2022-01-01 | End: 2022-01-01

## 2022-01-01 RX ORDER — METOPROLOL TARTRATE 5 MG/5ML
5 INJECTION INTRAVENOUS ONCE
Status: COMPLETED | OUTPATIENT
Start: 2022-01-01 | End: 2022-01-01

## 2022-01-01 RX ORDER — ROCURONIUM BROMIDE 10 MG/ML
INJECTION, SOLUTION INTRAVENOUS AS NEEDED
Status: DISCONTINUED | OUTPATIENT
Start: 2022-01-01 | End: 2022-01-01

## 2022-01-01 RX ORDER — DEXMEDETOMIDINE HYDROCHLORIDE 4 UG/ML
.1-1.4 INJECTION, SOLUTION INTRAVENOUS
Status: DISCONTINUED | OUTPATIENT
Start: 2022-01-01 | End: 2022-01-01 | Stop reason: HOSPADM

## 2022-01-01 RX ORDER — AMOXICILLIN 250 MG
1 CAPSULE ORAL 2 TIMES DAILY
Status: CANCELLED | OUTPATIENT
Start: 2022-01-01

## 2022-01-01 RX ORDER — DEXAMETHASONE SODIUM PHOSPHATE 4 MG/ML
8 INJECTION, SOLUTION INTRA-ARTICULAR; INTRALESIONAL; INTRAMUSCULAR; INTRAVENOUS; SOFT TISSUE ONCE AS NEEDED
Status: DISCONTINUED | OUTPATIENT
Start: 2022-01-01 | End: 2022-01-01

## 2022-01-01 RX ORDER — ONDANSETRON 2 MG/ML
INJECTION INTRAMUSCULAR; INTRAVENOUS AS NEEDED
Status: DISCONTINUED | OUTPATIENT
Start: 2022-01-01 | End: 2022-01-01

## 2022-01-01 RX ORDER — SODIUM CHLORIDE 9 MG/ML
INJECTION, SOLUTION INTRAVENOUS CONTINUOUS PRN
Status: DISCONTINUED | OUTPATIENT
Start: 2022-01-01 | End: 2022-01-01

## 2022-01-01 RX ORDER — FENTANYL CITRATE 50 UG/ML
50 INJECTION, SOLUTION INTRAMUSCULAR; INTRAVENOUS
Status: DISCONTINUED | OUTPATIENT
Start: 2022-01-01 | End: 2022-01-01

## 2022-01-01 RX ORDER — FUROSEMIDE 10 MG/ML
60 INJECTION INTRAMUSCULAR; INTRAVENOUS ONCE
Status: COMPLETED | OUTPATIENT
Start: 2022-01-01 | End: 2022-01-01

## 2022-01-01 RX ORDER — SODIUM CHLORIDE, SODIUM GLUCONATE, SODIUM ACETATE, POTASSIUM CHLORIDE, MAGNESIUM CHLORIDE, SODIUM PHOSPHATE, DIBASIC, AND POTASSIUM PHOSPHATE .53; .5; .37; .037; .03; .012; .00082 G/100ML; G/100ML; G/100ML; G/100ML; G/100ML; G/100ML; G/100ML
500 INJECTION, SOLUTION INTRAVENOUS ONCE
Status: DISCONTINUED | OUTPATIENT
Start: 2022-01-01 | End: 2022-01-01

## 2022-01-01 RX ORDER — LABETALOL 20 MG/4 ML (5 MG/ML) INTRAVENOUS SYRINGE
10 ONCE
Status: COMPLETED | OUTPATIENT
Start: 2022-01-01 | End: 2022-01-01

## 2022-01-01 RX ORDER — DEXAMETHASONE SODIUM PHOSPHATE 4 MG/ML
8 INJECTION, SOLUTION INTRA-ARTICULAR; INTRALESIONAL; INTRAMUSCULAR; INTRAVENOUS; SOFT TISSUE EVERY 8 HOURS SCHEDULED
Status: COMPLETED | OUTPATIENT
Start: 2022-01-01 | End: 2022-01-01

## 2022-01-01 RX ORDER — HYDROMORPHONE HCL/PF 1 MG/ML
0.5 SYRINGE (ML) INJECTION
Status: DISCONTINUED | OUTPATIENT
Start: 2022-01-01 | End: 2022-01-01 | Stop reason: HOSPADM

## 2022-01-01 RX ORDER — HEPARIN SODIUM 5000 [USP'U]/ML
5000 INJECTION, SOLUTION INTRAVENOUS; SUBCUTANEOUS EVERY 8 HOURS SCHEDULED
Status: COMPLETED | OUTPATIENT
Start: 2022-01-01 | End: 2022-01-01

## 2022-01-01 RX ORDER — GLYCOPYRROLATE 0.2 MG/ML
0.2 INJECTION INTRAMUSCULAR; INTRAVENOUS ONCE
Status: COMPLETED | OUTPATIENT
Start: 2022-01-01 | End: 2022-01-01

## 2022-01-01 RX ORDER — LOSARTAN POTASSIUM 25 MG/1
25 TABLET ORAL 2 TIMES DAILY
Status: DISCONTINUED | OUTPATIENT
Start: 2022-01-01 | End: 2022-01-01

## 2022-01-01 RX ORDER — LABETALOL 20 MG/4 ML (5 MG/ML) INTRAVENOUS SYRINGE
10 EVERY 6 HOURS PRN
Status: DISCONTINUED | OUTPATIENT
Start: 2022-01-01 | End: 2022-01-01

## 2022-01-01 RX ORDER — METOCLOPRAMIDE HYDROCHLORIDE 5 MG/ML
10 INJECTION INTRAMUSCULAR; INTRAVENOUS ONCE AS NEEDED
Status: DISCONTINUED | OUTPATIENT
Start: 2022-01-01 | End: 2022-01-01 | Stop reason: HOSPADM

## 2022-01-01 RX ORDER — DIPHENHYDRAMINE HYDROCHLORIDE 50 MG/ML
12.5 INJECTION INTRAMUSCULAR; INTRAVENOUS ONCE AS NEEDED
Status: DISCONTINUED | OUTPATIENT
Start: 2022-01-01 | End: 2022-01-01

## 2022-01-01 RX ORDER — CEFAZOLIN SODIUM 2 G/50ML
2000 SOLUTION INTRAVENOUS ONCE
Status: DISCONTINUED | OUTPATIENT
Start: 2022-01-01 | End: 2022-01-01

## 2022-01-01 RX ORDER — FUROSEMIDE 10 MG/ML
40 INJECTION INTRAMUSCULAR; INTRAVENOUS ONCE
Status: COMPLETED | OUTPATIENT
Start: 2022-01-01 | End: 2022-01-01

## 2022-01-01 RX ORDER — CHLORHEXIDINE GLUCONATE 0.12 MG/ML
15 RINSE ORAL EVERY 12 HOURS SCHEDULED
Status: DISCONTINUED | OUTPATIENT
Start: 2022-01-01 | End: 2022-01-01

## 2022-01-01 RX ORDER — SUCCINYLCHOLINE/SOD CL,ISO/PF 100 MG/5ML
100 SYRINGE (ML) INTRAVENOUS ONCE
Status: COMPLETED | OUTPATIENT
Start: 2022-01-01 | End: 2022-01-01

## 2022-01-01 RX ORDER — FENTANYL CITRATE 50 UG/ML
INJECTION, SOLUTION INTRAMUSCULAR; INTRAVENOUS
Status: COMPLETED
Start: 2022-01-01 | End: 2022-01-01

## 2022-01-01 RX ORDER — FENTANYL CITRATE 50 UG/ML
50 INJECTION, SOLUTION INTRAMUSCULAR; INTRAVENOUS ONCE
Status: COMPLETED | OUTPATIENT
Start: 2022-01-01 | End: 2022-01-01

## 2022-01-01 RX ORDER — LIDOCAINE HYDROCHLORIDE 10 MG/ML
10 INJECTION, SOLUTION EPIDURAL; INFILTRATION; INTRACAUDAL; PERINEURAL ONCE
Status: COMPLETED | OUTPATIENT
Start: 2022-01-01 | End: 2022-01-01

## 2022-01-01 RX ORDER — MORPHINE SULFATE 4 MG/ML
4 INJECTION, SOLUTION INTRAMUSCULAR; INTRAVENOUS ONCE
Status: COMPLETED | OUTPATIENT
Start: 2022-01-01 | End: 2022-01-01

## 2022-01-01 RX ORDER — SODIUM CHLORIDE, SODIUM LACTATE, POTASSIUM CHLORIDE, CALCIUM CHLORIDE 600; 310; 30; 20 MG/100ML; MG/100ML; MG/100ML; MG/100ML
INJECTION, SOLUTION INTRAVENOUS CONTINUOUS PRN
Status: DISCONTINUED | OUTPATIENT
Start: 2022-01-01 | End: 2022-01-01

## 2022-01-01 RX ORDER — MIDAZOLAM HYDROCHLORIDE 2 MG/2ML
4 INJECTION, SOLUTION INTRAMUSCULAR; INTRAVENOUS ONCE
Status: COMPLETED | OUTPATIENT
Start: 2022-01-01 | End: 2022-01-01

## 2022-01-01 RX ORDER — HEPARIN SODIUM 5000 [USP'U]/ML
5000 INJECTION, SOLUTION INTRAVENOUS; SUBCUTANEOUS EVERY 8 HOURS SCHEDULED
Status: DISCONTINUED | OUTPATIENT
Start: 2022-01-01 | End: 2022-01-01 | Stop reason: SDUPTHER

## 2022-01-01 RX ORDER — FENTANYL CITRATE/PF 50 MCG/ML
25 SYRINGE (ML) INJECTION
Status: DISCONTINUED | OUTPATIENT
Start: 2022-01-01 | End: 2022-01-01

## 2022-01-01 RX ORDER — MIDAZOLAM HYDROCHLORIDE 2 MG/2ML
INJECTION, SOLUTION INTRAMUSCULAR; INTRAVENOUS
Status: COMPLETED
Start: 2022-01-01 | End: 2022-01-01

## 2022-01-01 RX ORDER — ETOMIDATE 2 MG/ML
20 INJECTION INTRAVENOUS ONCE
Status: COMPLETED | OUTPATIENT
Start: 2022-01-01 | End: 2022-01-01

## 2022-01-01 RX ORDER — NICARDIPINE HYDROCHLORIDE 2.5 MG/ML
INJECTION INTRAVENOUS
Status: COMPLETED
Start: 2022-01-01 | End: 2022-01-01

## 2022-01-01 RX ORDER — HYDRALAZINE HYDROCHLORIDE 20 MG/ML
10 INJECTION INTRAMUSCULAR; INTRAVENOUS EVERY 6 HOURS PRN
Status: DISCONTINUED | OUTPATIENT
Start: 2022-01-01 | End: 2022-01-01

## 2022-01-01 RX ORDER — FENTANYL CITRATE-0.9 % NACL/PF 10 MCG/ML
100 PLASTIC BAG, INJECTION (ML) INTRAVENOUS CONTINUOUS
Status: DISCONTINUED | OUTPATIENT
Start: 2022-01-01 | End: 2022-01-01 | Stop reason: SDUPTHER

## 2022-01-01 RX ORDER — DEXTROSE, SODIUM CHLORIDE, AND POTASSIUM CHLORIDE 5; .45; .15 G/100ML; G/100ML; G/100ML
100 INJECTION INTRAVENOUS CONTINUOUS
Status: DISCONTINUED | OUTPATIENT
Start: 2022-01-01 | End: 2022-01-01

## 2022-01-01 RX ORDER — SODIUM CHLORIDE 9 MG/ML
75 INJECTION, SOLUTION INTRAVENOUS CONTINUOUS
Status: DISCONTINUED | OUTPATIENT
Start: 2022-01-01 | End: 2022-01-01

## 2022-01-01 RX ORDER — MAGNESIUM HYDROXIDE 1200 MG/15ML
LIQUID ORAL AS NEEDED
Status: DISCONTINUED | OUTPATIENT
Start: 2022-01-01 | End: 2022-01-01 | Stop reason: HOSPADM

## 2022-01-01 RX ORDER — LABETALOL 20 MG/4 ML (5 MG/ML) INTRAVENOUS SYRINGE
10 AS NEEDED
Status: COMPLETED | OUTPATIENT
Start: 2022-01-01 | End: 2022-01-01

## 2022-01-01 RX ORDER — ALBUMIN, HUMAN INJ 5% 5 %
SOLUTION INTRAVENOUS
Status: COMPLETED
Start: 2022-01-01 | End: 2022-01-01

## 2022-01-01 RX ORDER — HYDROMORPHONE HCL/PF 1 MG/ML
0.5 SYRINGE (ML) INJECTION
Status: DISCONTINUED | OUTPATIENT
Start: 2022-01-01 | End: 2022-01-01

## 2022-01-01 RX ORDER — ACETAMINOPHEN 160 MG/5ML
650 SUSPENSION, ORAL (FINAL DOSE FORM) ORAL EVERY 6 HOURS
Status: DISCONTINUED | OUTPATIENT
Start: 2022-01-01 | End: 2022-01-01

## 2022-01-01 RX ORDER — FUROSEMIDE 10 MG/ML
20 INJECTION INTRAMUSCULAR; INTRAVENOUS ONCE
Status: COMPLETED | OUTPATIENT
Start: 2022-01-01 | End: 2022-01-01

## 2022-01-01 RX ORDER — MAGNESIUM SULFATE HEPTAHYDRATE 40 MG/ML
2 INJECTION, SOLUTION INTRAVENOUS ONCE
Status: COMPLETED | OUTPATIENT
Start: 2022-01-01 | End: 2022-01-01

## 2022-01-01 RX ORDER — SODIUM CHLORIDE, SODIUM GLUCONATE, SODIUM ACETATE, POTASSIUM CHLORIDE, MAGNESIUM CHLORIDE, SODIUM PHOSPHATE, DIBASIC, AND POTASSIUM PHOSPHATE .53; .5; .37; .037; .03; .012; .00082 G/100ML; G/100ML; G/100ML; G/100ML; G/100ML; G/100ML; G/100ML
75 INJECTION, SOLUTION INTRAVENOUS CONTINUOUS
Status: DISCONTINUED | OUTPATIENT
Start: 2022-01-01 | End: 2022-01-01

## 2022-01-01 RX ORDER — LIDOCAINE WITH 8.4% SOD BICARB 0.9%(10ML)
SYRINGE (ML) INJECTION CODE/TRAUMA/SEDATION MEDICATION
Status: COMPLETED | OUTPATIENT
Start: 2022-01-01 | End: 2022-01-01

## 2022-01-01 RX ORDER — SODIUM CHLORIDE 9 MG/ML
125 INJECTION, SOLUTION INTRAVENOUS CONTINUOUS
Status: DISCONTINUED | OUTPATIENT
Start: 2022-01-01 | End: 2022-01-01

## 2022-01-01 RX ADMIN — CHLORHEXIDINE GLUCONATE 15 ML: 1.2 SOLUTION ORAL at 20:12

## 2022-01-01 RX ADMIN — SODIUM CHLORIDE 75 ML/HR: 9 INJECTION, SOLUTION INTRAVENOUS at 00:46

## 2022-01-01 RX ADMIN — ACETAMINOPHEN 650 MG: 650 SUSPENSION ORAL at 18:41

## 2022-01-01 RX ADMIN — HEPARIN SODIUM 5000 UNITS: 5000 INJECTION INTRAVENOUS; SUBCUTANEOUS at 13:53

## 2022-01-01 RX ADMIN — CEFEPIME HYDROCHLORIDE 2000 MG: 2 INJECTION, POWDER, FOR SOLUTION INTRAVENOUS at 11:31

## 2022-01-01 RX ADMIN — ACETAMINOPHEN 975 MG: 650 SUSPENSION ORAL at 06:23

## 2022-01-01 RX ADMIN — WHITE PETROLATUM 57.7 %-MINERAL OIL 31.9 % EYE OINTMENT: at 00:39

## 2022-01-01 RX ADMIN — FENTANYL CITRATE 50 MCG: 50 INJECTION INTRAMUSCULAR; INTRAVENOUS at 09:30

## 2022-01-01 RX ADMIN — CHLORHEXIDINE GLUCONATE 15 ML: 1.2 SOLUTION ORAL at 08:50

## 2022-01-01 RX ADMIN — LABETALOL HYDROCHLORIDE 10 MG: 5 INJECTION, SOLUTION INTRAVENOUS at 08:42

## 2022-01-01 RX ADMIN — PROPOFOL 40 MCG/KG/MIN: 10 INJECTION, EMULSION INTRAVENOUS at 19:20

## 2022-01-01 RX ADMIN — LABETALOL HYDROCHLORIDE 10 MG: 5 INJECTION, SOLUTION INTRAVENOUS at 00:49

## 2022-01-01 RX ADMIN — PROPOFOL 35 MCG/KG/MIN: 10 INJECTION, EMULSION INTRAVENOUS at 00:38

## 2022-01-01 RX ADMIN — DEXAMETHASONE SODIUM PHOSPHATE 8 MG: 4 INJECTION INTRA-ARTICULAR; INTRALESIONAL; INTRAMUSCULAR; INTRAVENOUS; SOFT TISSUE at 21:17

## 2022-01-01 RX ADMIN — DEXAMETHASONE SODIUM PHOSPHATE 8 MG: 4 INJECTION INTRA-ARTICULAR; INTRALESIONAL; INTRAMUSCULAR; INTRAVENOUS; SOFT TISSUE at 05:18

## 2022-01-01 RX ADMIN — CEFEPIME HYDROCHLORIDE 2000 MG: 2 INJECTION, POWDER, FOR SOLUTION INTRAVENOUS at 02:14

## 2022-01-01 RX ADMIN — POTASSIUM CHLORIDE 20 MEQ: 14.9 INJECTION, SOLUTION INTRAVENOUS at 12:03

## 2022-01-01 RX ADMIN — HEPARIN SODIUM 5000 UNITS: 5000 INJECTION INTRAVENOUS; SUBCUTANEOUS at 23:08

## 2022-01-01 RX ADMIN — VANCOMYCIN HYDROCHLORIDE 1500 MG: 10 INJECTION, POWDER, LYOPHILIZED, FOR SOLUTION INTRAVENOUS at 14:46

## 2022-01-01 RX ADMIN — SODIUM CHLORIDE, SODIUM LACTATE, POTASSIUM CHLORIDE, AND CALCIUM CHLORIDE 125 ML/HR: .6; .31; .03; .02 INJECTION, SOLUTION INTRAVENOUS at 00:27

## 2022-01-01 RX ADMIN — MIDAZOLAM 2 MG: 1 INJECTION INTRAMUSCULAR; INTRAVENOUS at 09:25

## 2022-01-01 RX ADMIN — HYDROMORPHONE HYDROCHLORIDE 1 MG/HR: 10 INJECTION, SOLUTION INTRAMUSCULAR; INTRAVENOUS; SUBCUTANEOUS at 00:39

## 2022-01-01 RX ADMIN — PROPOFOL 15 MCG/KG/MIN: 10 INJECTION, EMULSION INTRAVENOUS at 21:17

## 2022-01-01 RX ADMIN — CHLORHEXIDINE GLUCONATE 15 ML: 1.2 SOLUTION ORAL at 08:33

## 2022-01-01 RX ADMIN — PROPOFOL 40 MCG/KG/MIN: 10 INJECTION, EMULSION INTRAVENOUS at 22:21

## 2022-01-01 RX ADMIN — WHITE PETROLATUM 57.7 %-MINERAL OIL 31.9 % EYE OINTMENT: at 02:46

## 2022-01-01 RX ADMIN — ALBUMIN (HUMAN) 12.5 G: 12.5 INJECTION, SOLUTION INTRAVENOUS at 13:47

## 2022-01-01 RX ADMIN — CEFEPIME HYDROCHLORIDE 1000 MG: 1 INJECTION, POWDER, FOR SOLUTION INTRAMUSCULAR; INTRAVENOUS at 00:39

## 2022-01-01 RX ADMIN — CEFEPIME HYDROCHLORIDE 2000 MG: 2 INJECTION, POWDER, FOR SOLUTION INTRAVENOUS at 00:30

## 2022-01-01 RX ADMIN — CHLORHEXIDINE GLUCONATE 15 ML: 1.2 SOLUTION ORAL at 20:17

## 2022-01-01 RX ADMIN — FENTANYL CITRATE 50 MCG: 50 INJECTION INTRAMUSCULAR; INTRAVENOUS at 08:55

## 2022-01-01 RX ADMIN — FENTANYL CITRATE 100 MCG/HR: 50 INJECTION INTRAMUSCULAR; INTRAVENOUS at 20:17

## 2022-01-01 RX ADMIN — PROPOFOL 25 MCG/KG/MIN: 10 INJECTION, EMULSION INTRAVENOUS at 17:46

## 2022-01-01 RX ADMIN — IODIXANOL 100 ML: 320 INJECTION, SOLUTION INTRAVASCULAR at 14:43

## 2022-01-01 RX ADMIN — WHITE PETROLATUM 57.7 %-MINERAL OIL 31.9 % EYE OINTMENT: at 21:59

## 2022-01-01 RX ADMIN — HYDRALAZINE HYDROCHLORIDE 10 MG: 20 INJECTION, SOLUTION INTRAMUSCULAR; INTRAVENOUS at 15:19

## 2022-01-01 RX ADMIN — LIDOCAINE HYDROCHLORIDE 10 ML: 10 INJECTION, SOLUTION EPIDURAL; INFILTRATION; INTRACAUDAL; PERINEURAL at 10:43

## 2022-01-01 RX ADMIN — LABETALOL HYDROCHLORIDE 10 MG: 5 INJECTION, SOLUTION INTRAVENOUS at 23:06

## 2022-01-01 RX ADMIN — VANCOMYCIN HYDROCHLORIDE 1500 MG: 10 INJECTION, POWDER, LYOPHILIZED, FOR SOLUTION INTRAVENOUS at 01:08

## 2022-01-01 RX ADMIN — FUROSEMIDE 60 MG: 10 INJECTION, SOLUTION INTRAMUSCULAR; INTRAVENOUS at 15:20

## 2022-01-01 RX ADMIN — CEFTRIAXONE 1000 MG: 2 INJECTION, POWDER, FOR SOLUTION INTRAMUSCULAR; INTRAVENOUS at 09:30

## 2022-01-01 RX ADMIN — VANCOMYCIN HYDROCHLORIDE 1750 MG: 1 INJECTION, POWDER, LYOPHILIZED, FOR SOLUTION INTRAVENOUS at 15:51

## 2022-01-01 RX ADMIN — DEXTROSE, SODIUM CHLORIDE, AND POTASSIUM CHLORIDE 60 ML/HR: 5; .45; .15 INJECTION INTRAVENOUS at 00:31

## 2022-01-01 RX ADMIN — ALBUMIN (HUMAN) 12.5 G: 12.5 INJECTION, SOLUTION INTRAVENOUS at 23:13

## 2022-01-01 RX ADMIN — MIDAZOLAM 2 MG: 1 INJECTION INTRAMUSCULAR; INTRAVENOUS at 10:18

## 2022-01-01 RX ADMIN — FENTANYL CITRATE 100 MCG/HR: 50 INJECTION INTRAMUSCULAR; INTRAVENOUS at 07:52

## 2022-01-01 RX ADMIN — FENTANYL CITRATE 100 MCG/HR: 50 INJECTION INTRAMUSCULAR; INTRAVENOUS at 21:53

## 2022-01-01 RX ADMIN — HYDROMORPHONE HYDROCHLORIDE 0.5 MG: 1 INJECTION, SOLUTION INTRAMUSCULAR; INTRAVENOUS; SUBCUTANEOUS at 08:15

## 2022-01-01 RX ADMIN — PROPOFOL 40 MCG/KG/MIN: 10 INJECTION, EMULSION INTRAVENOUS at 00:21

## 2022-01-01 RX ADMIN — PHENYLEPHRINE HYDROCHLORIDE 20 MCG/MIN: 10 INJECTION INTRAVENOUS at 02:40

## 2022-01-01 RX ADMIN — FENTANYL CITRATE 50 MCG: 50 INJECTION INTRAMUSCULAR; INTRAVENOUS at 22:14

## 2022-01-01 RX ADMIN — PROPOFOL 20 MCG/KG/MIN: 10 INJECTION, EMULSION INTRAVENOUS at 18:04

## 2022-01-01 RX ADMIN — WHITE PETROLATUM 57.7 %-MINERAL OIL 31.9 % EYE OINTMENT: at 21:16

## 2022-01-01 RX ADMIN — SODIUM CHLORIDE, SODIUM LACTATE, POTASSIUM CHLORIDE, AND CALCIUM CHLORIDE 1000 ML: .6; .31; .03; .02 INJECTION, SOLUTION INTRAVENOUS at 21:17

## 2022-01-01 RX ADMIN — NOREPINEPHRINE BITARTRATE 20 MCG/MIN: 1 INJECTION, SOLUTION, CONCENTRATE INTRAVENOUS at 12:26

## 2022-01-01 RX ADMIN — PROPOFOL 35 MCG/KG/MIN: 10 INJECTION, EMULSION INTRAVENOUS at 06:16

## 2022-01-01 RX ADMIN — HEPARIN SODIUM 5000 UNITS: 5000 INJECTION INTRAVENOUS; SUBCUTANEOUS at 21:40

## 2022-01-01 RX ADMIN — FENTANYL CITRATE 100 MCG/HR: 50 INJECTION INTRAMUSCULAR; INTRAVENOUS at 17:05

## 2022-01-01 RX ADMIN — FENTANYL CITRATE 100 MCG/HR: 50 INJECTION INTRAMUSCULAR; INTRAVENOUS at 03:50

## 2022-01-01 RX ADMIN — PROPOFOL 40 MCG/KG/MIN: 10 INJECTION, EMULSION INTRAVENOUS at 11:34

## 2022-01-01 RX ADMIN — PROPOFOL 40 MCG/KG/MIN: 10 INJECTION, EMULSION INTRAVENOUS at 01:11

## 2022-01-01 RX ADMIN — PROPOFOL 40 MCG/KG/MIN: 10 INJECTION, EMULSION INTRAVENOUS at 20:10

## 2022-01-01 RX ADMIN — FENTANYL CITRATE 50 MCG: 50 INJECTION INTRAMUSCULAR; INTRAVENOUS at 08:29

## 2022-01-01 RX ADMIN — FENTANYL CITRATE 50 MCG: 50 INJECTION INTRAMUSCULAR; INTRAVENOUS at 18:15

## 2022-01-01 RX ADMIN — CHLORHEXIDINE GLUCONATE 15 ML: 1.2 SOLUTION ORAL at 09:34

## 2022-01-01 RX ADMIN — PROPOFOL 40 MCG/KG/MIN: 10 INJECTION, EMULSION INTRAVENOUS at 23:49

## 2022-01-01 RX ADMIN — DEXAMETHASONE SODIUM PHOSPHATE 8 MG: 4 INJECTION INTRA-ARTICULAR; INTRALESIONAL; INTRAMUSCULAR; INTRAVENOUS; SOFT TISSUE at 14:03

## 2022-01-01 RX ADMIN — CHLORHEXIDINE GLUCONATE 15 ML: 1.2 SOLUTION ORAL at 09:04

## 2022-01-01 RX ADMIN — CHLORHEXIDINE GLUCONATE 15 ML: 1.2 SOLUTION ORAL at 08:29

## 2022-01-01 RX ADMIN — HEPARIN SODIUM 5000 UNITS: 5000 INJECTION INTRAVENOUS; SUBCUTANEOUS at 06:22

## 2022-01-01 RX ADMIN — MORPHINE SULFATE 4 MG: 4 INJECTION INTRAVENOUS at 16:50

## 2022-01-01 RX ADMIN — HYDROCORTISONE SODIUM SUCCINATE 50 MG: 100 INJECTION, POWDER, FOR SOLUTION INTRAMUSCULAR; INTRAVENOUS at 12:30

## 2022-01-01 RX ADMIN — FENTANYL CITRATE 50 MCG: 50 INJECTION INTRAMUSCULAR; INTRAVENOUS at 02:58

## 2022-01-01 RX ADMIN — DEXAMETHASONE SODIUM PHOSPHATE 8 MG: 4 INJECTION INTRA-ARTICULAR; INTRALESIONAL; INTRAMUSCULAR; INTRAVENOUS; SOFT TISSUE at 18:26

## 2022-01-01 RX ADMIN — CEFEPIME HYDROCHLORIDE 1000 MG: 1 INJECTION, POWDER, FOR SOLUTION INTRAMUSCULAR; INTRAVENOUS at 12:38

## 2022-01-01 RX ADMIN — VANCOMYCIN HYDROCHLORIDE 1000 MG: 10 INJECTION, POWDER, LYOPHILIZED, FOR SOLUTION INTRAVENOUS at 01:40

## 2022-01-01 RX ADMIN — Medication 100 MG: at 18:00

## 2022-01-01 RX ADMIN — EPOPROSTENOL 50 NG/KG/MIN: 1.5 INJECTION, POWDER, LYOPHILIZED, FOR SOLUTION INTRAVENOUS at 18:29

## 2022-01-01 RX ADMIN — SODIUM CHLORIDE, SODIUM GLUCONATE, SODIUM ACETATE, POTASSIUM CHLORIDE, MAGNESIUM CHLORIDE, SODIUM PHOSPHATE, DIBASIC, AND POTASSIUM PHOSPHATE 125 ML/HR: .53; .5; .37; .037; .03; .012; .00082 INJECTION, SOLUTION INTRAVENOUS at 06:24

## 2022-01-01 RX ADMIN — CEFEPIME HYDROCHLORIDE 2000 MG: 2 INJECTION, POWDER, FOR SOLUTION INTRAVENOUS at 01:01

## 2022-01-01 RX ADMIN — CHLORHEXIDINE GLUCONATE 15 ML: 1.2 SOLUTION ORAL at 22:00

## 2022-01-01 RX ADMIN — HYDRALAZINE HYDROCHLORIDE 10 MG: 20 INJECTION, SOLUTION INTRAMUSCULAR; INTRAVENOUS at 16:11

## 2022-01-01 RX ADMIN — SODIUM CHLORIDE, SODIUM GLUCONATE, SODIUM ACETATE, POTASSIUM CHLORIDE, MAGNESIUM CHLORIDE, SODIUM PHOSPHATE, DIBASIC, AND POTASSIUM PHOSPHATE 75 ML/HR: .53; .5; .37; .037; .03; .012; .00082 INJECTION, SOLUTION INTRAVENOUS at 07:28

## 2022-01-01 RX ADMIN — PROPOFOL 40 MCG/KG/MIN: 10 INJECTION, EMULSION INTRAVENOUS at 12:43

## 2022-01-01 RX ADMIN — CHLORHEXIDINE GLUCONATE 15 ML: 1.2 SOLUTION ORAL at 21:00

## 2022-01-01 RX ADMIN — LABETALOL HYDROCHLORIDE 10 MG: 5 INJECTION, SOLUTION INTRAVENOUS at 20:17

## 2022-01-01 RX ADMIN — FENTANYL CITRATE 50 MCG: 50 INJECTION INTRAMUSCULAR; INTRAVENOUS at 19:00

## 2022-01-01 RX ADMIN — ETOMIDATE 20 MG: 20 INJECTION, SOLUTION INTRAVENOUS at 17:58

## 2022-01-01 RX ADMIN — HEPARIN SODIUM 5000 UNITS: 5000 INJECTION INTRAVENOUS; SUBCUTANEOUS at 22:00

## 2022-01-01 RX ADMIN — ONDANSETRON 4 MG: 2 INJECTION INTRAMUSCULAR; INTRAVENOUS at 21:33

## 2022-01-01 RX ADMIN — FENTANYL CITRATE 100 MCG/HR: 50 INJECTION INTRAMUSCULAR; INTRAVENOUS at 16:17

## 2022-01-01 RX ADMIN — FENTANYL CITRATE 75 MCG/HR: 50 INJECTION INTRAMUSCULAR; INTRAVENOUS at 23:06

## 2022-01-01 RX ADMIN — FENTANYL CITRATE 50 MCG: 50 INJECTION INTRAMUSCULAR; INTRAVENOUS at 20:20

## 2022-01-01 RX ADMIN — IODIXANOL 120 ML: 320 INJECTION, SOLUTION INTRAVASCULAR at 22:01

## 2022-01-01 RX ADMIN — PROPOFOL 40 MCG/KG/MIN: 10 INJECTION, EMULSION INTRAVENOUS at 09:33

## 2022-01-01 RX ADMIN — HEPARIN SODIUM 5000 UNITS: 5000 INJECTION INTRAVENOUS; SUBCUTANEOUS at 21:18

## 2022-01-01 RX ADMIN — ACETAMINOPHEN 975 MG: 325 TABLET ORAL at 21:40

## 2022-01-01 RX ADMIN — FENTANYL CITRATE 50 MCG: 50 INJECTION INTRAMUSCULAR; INTRAVENOUS at 00:06

## 2022-01-01 RX ADMIN — SODIUM CHLORIDE, SODIUM GLUCONATE, SODIUM ACETATE, POTASSIUM CHLORIDE, MAGNESIUM CHLORIDE, SODIUM PHOSPHATE, DIBASIC, AND POTASSIUM PHOSPHATE 125 ML/HR: .53; .5; .37; .037; .03; .012; .00082 INJECTION, SOLUTION INTRAVENOUS at 10:01

## 2022-01-01 RX ADMIN — ROCURONIUM BROMIDE 50 MG: 50 INJECTION, SOLUTION INTRAVENOUS at 10:45

## 2022-01-01 RX ADMIN — FAMOTIDINE 20 MG: 10 INJECTION INTRAVENOUS at 08:51

## 2022-01-01 RX ADMIN — CEFEPIME HYDROCHLORIDE 2000 MG: 2 INJECTION, POWDER, FOR SOLUTION INTRAVENOUS at 14:50

## 2022-01-01 RX ADMIN — NOREPINEPHRINE BITARTRATE 24 MCG/MIN: 1 INJECTION, SOLUTION, CONCENTRATE INTRAVENOUS at 05:25

## 2022-01-01 RX ADMIN — SODIUM CHLORIDE: 0.9 INJECTION, SOLUTION INTRAVENOUS at 20:48

## 2022-01-01 RX ADMIN — DEXMEDETOMIDINE HYDROCHLORIDE 1.1 MCG/KG/HR: 400 INJECTION INTRAVENOUS at 22:29

## 2022-01-01 RX ADMIN — CHLORHEXIDINE GLUCONATE 15 ML: 1.2 SOLUTION ORAL at 08:35

## 2022-01-01 RX ADMIN — FENTANYL CITRATE 100 MCG: 50 INJECTION INTRAMUSCULAR; INTRAVENOUS at 09:20

## 2022-01-01 RX ADMIN — DEXMEDETOMIDINE HYDROCHLORIDE 1.1 MCG/KG/HR: 400 INJECTION INTRAVENOUS at 02:58

## 2022-01-01 RX ADMIN — HYDRALAZINE HYDROCHLORIDE 10 MG: 20 INJECTION, SOLUTION INTRAMUSCULAR; INTRAVENOUS at 08:45

## 2022-01-01 RX ADMIN — CHLORHEXIDINE GLUCONATE 15 ML: 1.2 SOLUTION ORAL at 09:54

## 2022-01-01 RX ADMIN — PROPOFOL 40 MCG/KG/MIN: 10 INJECTION, EMULSION INTRAVENOUS at 15:58

## 2022-01-01 RX ADMIN — SODIUM CHLORIDE, SODIUM GLUCONATE, SODIUM ACETATE, POTASSIUM CHLORIDE, MAGNESIUM CHLORIDE, SODIUM PHOSPHATE, DIBASIC, AND POTASSIUM PHOSPHATE 500 ML: .53; .5; .37; .037; .03; .012; .00082 INJECTION, SOLUTION INTRAVENOUS at 05:38

## 2022-01-01 RX ADMIN — NOREPINEPHRINE BITARTRATE 4 MCG/MIN: 1 INJECTION, SOLUTION, CONCENTRATE INTRAVENOUS at 17:26

## 2022-01-01 RX ADMIN — LABETALOL HYDROCHLORIDE 10 MG: 5 INJECTION, SOLUTION INTRAVENOUS at 08:22

## 2022-01-01 RX ADMIN — FENTANYL CITRATE 100 MCG/HR: 50 INJECTION INTRAMUSCULAR; INTRAVENOUS at 11:50

## 2022-01-01 RX ADMIN — LOSARTAN POTASSIUM 50 MG: 50 TABLET, FILM COATED ORAL at 12:18

## 2022-01-01 RX ADMIN — PROPOFOL 25 MCG/KG/MIN: 10 INJECTION, EMULSION INTRAVENOUS at 05:54

## 2022-01-01 RX ADMIN — SODIUM CHLORIDE, SODIUM LACTATE, POTASSIUM CHLORIDE, AND CALCIUM CHLORIDE 125 ML/HR: .6; .31; .03; .02 INJECTION, SOLUTION INTRAVENOUS at 21:16

## 2022-01-01 RX ADMIN — CHLORHEXIDINE GLUCONATE 15 ML: 1.2 SOLUTION ORAL at 21:40

## 2022-01-01 RX ADMIN — FENTANYL CITRATE 50 MCG: 50 INJECTION INTRAMUSCULAR; INTRAVENOUS at 23:09

## 2022-01-01 RX ADMIN — FUROSEMIDE 40 MG: 10 INJECTION, SOLUTION INTRAMUSCULAR; INTRAVENOUS at 11:07

## 2022-01-01 RX ADMIN — HYDRALAZINE HYDROCHLORIDE 10 MG: 20 INJECTION, SOLUTION INTRAMUSCULAR; INTRAVENOUS at 12:06

## 2022-01-01 RX ADMIN — VASOPRESSIN 0.04 UNITS/MIN: 20 INJECTION INTRAVENOUS at 05:00

## 2022-01-01 RX ADMIN — LABETALOL HYDROCHLORIDE 10 MG: 5 INJECTION, SOLUTION INTRAVENOUS at 15:37

## 2022-01-01 RX ADMIN — PROPOFOL 40 MCG/KG/MIN: 10 INJECTION, EMULSION INTRAVENOUS at 07:49

## 2022-01-01 RX ADMIN — PHENYLEPHRINE HYDROCHLORIDE 70 MCG/MIN: 10 INJECTION INTRAVENOUS at 05:12

## 2022-01-01 RX ADMIN — PHENYLEPHRINE HYDROCHLORIDE 10 MCG/MIN: 10 INJECTION INTRAVENOUS at 20:47

## 2022-01-01 RX ADMIN — CHLORHEXIDINE GLUCONATE 15 ML: 1.2 SOLUTION ORAL at 00:26

## 2022-01-01 RX ADMIN — FENTANYL CITRATE 100 MCG: 50 INJECTION INTRAMUSCULAR; INTRAVENOUS at 10:17

## 2022-01-01 RX ADMIN — FENTANYL CITRATE 100 MCG: 50 INJECTION INTRAMUSCULAR; INTRAVENOUS at 10:18

## 2022-01-01 RX ADMIN — PROPOFOL 30 MCG/KG/MIN: 10 INJECTION, EMULSION INTRAVENOUS at 02:08

## 2022-01-01 RX ADMIN — LORAZEPAM 1 MG: 2 INJECTION INTRAMUSCULAR; INTRAVENOUS at 16:18

## 2022-01-01 RX ADMIN — ACETAMINOPHEN 975 MG: 325 TABLET ORAL at 05:38

## 2022-01-01 RX ADMIN — NOREPINEPHRINE BITARTRATE 10 MCG/MIN: 1 INJECTION, SOLUTION, CONCENTRATE INTRAVENOUS at 23:27

## 2022-01-01 RX ADMIN — ACETAMINOPHEN 975 MG: 650 SUSPENSION ORAL at 05:54

## 2022-01-01 RX ADMIN — HEPARIN SODIUM 5000 UNITS: 5000 INJECTION INTRAVENOUS; SUBCUTANEOUS at 15:20

## 2022-01-01 RX ADMIN — DEXMEDETOMIDINE HYDROCHLORIDE 0.4 MCG/KG/HR: 400 INJECTION INTRAVENOUS at 03:17

## 2022-01-01 RX ADMIN — CEFEPIME HYDROCHLORIDE 1000 MG: 1 INJECTION, POWDER, FOR SOLUTION INTRAMUSCULAR; INTRAVENOUS at 13:02

## 2022-01-01 RX ADMIN — NOREPINEPHRINE BITARTRATE 30 MCG/MIN: 1 INJECTION, SOLUTION, CONCENTRATE INTRAVENOUS at 21:45

## 2022-01-01 RX ADMIN — FUROSEMIDE 20 MG: 10 INJECTION, SOLUTION INTRAVENOUS at 09:49

## 2022-01-01 RX ADMIN — VASOPRESSIN 0.04 UNITS/MIN: 20 INJECTION INTRAVENOUS at 14:44

## 2022-01-01 RX ADMIN — ACETAMINOPHEN 650 MG: 650 SUSPENSION ORAL at 11:42

## 2022-01-01 RX ADMIN — HYDROMORPHONE HYDROCHLORIDE 1 MG/HR: 10 INJECTION, SOLUTION INTRAMUSCULAR; INTRAVENOUS; SUBCUTANEOUS at 03:48

## 2022-01-01 RX ADMIN — PROPOFOL 45 MCG/KG/MIN: 10 INJECTION, EMULSION INTRAVENOUS at 00:26

## 2022-01-01 RX ADMIN — LABETALOL HYDROCHLORIDE 10 MG: 5 INJECTION, SOLUTION INTRAVENOUS at 21:42

## 2022-01-01 RX ADMIN — LABETALOL HYDROCHLORIDE 10 MG: 5 INJECTION, SOLUTION INTRAVENOUS at 22:33

## 2022-01-01 RX ADMIN — FENTANYL CITRATE 50 MCG: 50 INJECTION INTRAMUSCULAR; INTRAVENOUS at 01:00

## 2022-01-01 RX ADMIN — ACETAMINOPHEN 650 MG: 650 SUSPENSION ORAL at 01:12

## 2022-01-01 RX ADMIN — MIDAZOLAM HYDROCHLORIDE 2 MG: 2 INJECTION, SOLUTION INTRAMUSCULAR; INTRAVENOUS at 09:25

## 2022-01-01 RX ADMIN — HEPARIN SODIUM 5000 UNITS: 5000 INJECTION INTRAVENOUS; SUBCUTANEOUS at 16:28

## 2022-01-01 RX ADMIN — EPOPROSTENOL 50 NG/KG/MIN: 1.5 INJECTION, POWDER, LYOPHILIZED, FOR SOLUTION INTRAVENOUS at 12:06

## 2022-01-01 RX ADMIN — PROPOFOL 25 MCG/KG/MIN: 10 INJECTION, EMULSION INTRAVENOUS at 11:06

## 2022-01-01 RX ADMIN — ROCURONIUM BROMIDE 30 MG: 50 INJECTION, SOLUTION INTRAVENOUS at 21:46

## 2022-01-01 RX ADMIN — HEPARIN SODIUM 5000 UNITS: 5000 INJECTION INTRAVENOUS; SUBCUTANEOUS at 05:38

## 2022-01-01 RX ADMIN — ACETAMINOPHEN 975 MG: 650 SUSPENSION ORAL at 17:04

## 2022-01-01 RX ADMIN — NICARDIPINE HYDROCHLORIDE 25 MG: 2.5 INJECTION, SOLUTION INTRAVENOUS at 12:35

## 2022-01-01 RX ADMIN — SODIUM CHLORIDE, SODIUM LACTATE, POTASSIUM CHLORIDE, AND CALCIUM CHLORIDE 125 ML/HR: .6; .31; .03; .02 INJECTION, SOLUTION INTRAVENOUS at 05:53

## 2022-01-01 RX ADMIN — ROCURONIUM BROMIDE 20 MG: 50 INJECTION, SOLUTION INTRAVENOUS at 20:53

## 2022-01-01 RX ADMIN — PROPOFOL 20 MCG/KG/MIN: 10 INJECTION, EMULSION INTRAVENOUS at 00:07

## 2022-01-01 RX ADMIN — METOPROLOL TARTRATE 5 MG: 1 INJECTION, SOLUTION INTRAVENOUS at 18:31

## 2022-01-01 RX ADMIN — ACETAMINOPHEN 975 MG: 650 SUSPENSION ORAL at 00:13

## 2022-01-01 RX ADMIN — DEXMEDETOMIDINE HYDROCHLORIDE 0.4 MCG/KG/HR: 400 INJECTION INTRAVENOUS at 14:45

## 2022-01-01 RX ADMIN — PROPOFOL 15 MCG/KG/MIN: 10 INJECTION, EMULSION INTRAVENOUS at 14:20

## 2022-01-01 RX ADMIN — DEXAMETHASONE SODIUM PHOSPHATE 8 MG: 4 INJECTION INTRA-ARTICULAR; INTRALESIONAL; INTRAMUSCULAR; INTRAVENOUS; SOFT TISSUE at 15:05

## 2022-01-01 RX ADMIN — FENTANYL CITRATE 75 MCG/HR: 50 INJECTION INTRAMUSCULAR; INTRAVENOUS at 11:13

## 2022-01-01 RX ADMIN — PROPOFOL 25 MCG/KG/MIN: 10 INJECTION, EMULSION INTRAVENOUS at 18:23

## 2022-01-01 RX ADMIN — CEFEPIME HYDROCHLORIDE 2000 MG: 2 INJECTION, POWDER, FOR SOLUTION INTRAVENOUS at 13:52

## 2022-01-01 RX ADMIN — CEFEPIME HYDROCHLORIDE 2000 MG: 2 INJECTION, POWDER, FOR SOLUTION INTRAVENOUS at 01:57

## 2022-01-01 RX ADMIN — ALBUMIN (HUMAN) 12.5 G: 12.5 INJECTION, SOLUTION INTRAVENOUS at 17:57

## 2022-01-01 RX ADMIN — FENTANYL CITRATE 100 MCG/HR: 50 INJECTION INTRAMUSCULAR; INTRAVENOUS at 05:54

## 2022-01-01 RX ADMIN — HYDROCORTISONE SODIUM SUCCINATE 100 MG: 100 INJECTION, POWDER, FOR SOLUTION INTRAMUSCULAR; INTRAVENOUS at 08:50

## 2022-01-01 RX ADMIN — CHLORHEXIDINE GLUCONATE 15 ML: 1.2 SOLUTION ORAL at 20:33

## 2022-01-01 RX ADMIN — POTASSIUM CHLORIDE 20 MEQ: 14.9 INJECTION, SOLUTION INTRAVENOUS at 10:40

## 2022-01-01 RX ADMIN — ACETAMINOPHEN 975 MG: 650 SUSPENSION ORAL at 17:59

## 2022-01-01 RX ADMIN — FENTANYL CITRATE 50 MCG: 50 INJECTION INTRAMUSCULAR; INTRAVENOUS at 17:34

## 2022-01-01 RX ADMIN — SODIUM CHLORIDE, SODIUM GLUCONATE, SODIUM ACETATE, POTASSIUM CHLORIDE, MAGNESIUM CHLORIDE, SODIUM PHOSPHATE, DIBASIC, AND POTASSIUM PHOSPHATE 125 ML/HR: .53; .5; .37; .037; .03; .012; .00082 INJECTION, SOLUTION INTRAVENOUS at 15:01

## 2022-01-01 RX ADMIN — DEXAMETHASONE SODIUM PHOSPHATE 8 MG: 4 INJECTION INTRA-ARTICULAR; INTRALESIONAL; INTRAMUSCULAR; INTRAVENOUS; SOFT TISSUE at 05:48

## 2022-01-01 RX ADMIN — GLYCOPYRROLATE 0.2 MG: 0.2 INJECTION INTRAMUSCULAR; INTRAVENOUS at 16:18

## 2022-01-01 RX ADMIN — IPRATROPIUM BROMIDE AND ALBUTEROL SULFATE 3 ML: 2.5; .5 SOLUTION RESPIRATORY (INHALATION) at 16:50

## 2022-01-01 RX ADMIN — VASOPRESSIN 0.04 UNITS/MIN: 20 INJECTION INTRAVENOUS at 21:57

## 2022-01-01 RX ADMIN — CHLORHEXIDINE GLUCONATE 15 ML: 1.2 SOLUTION ORAL at 20:20

## 2022-01-01 RX ADMIN — FENTANYL CITRATE 100 MCG/HR: 50 INJECTION INTRAMUSCULAR; INTRAVENOUS at 23:41

## 2022-01-01 RX ADMIN — MAGNESIUM SULFATE IN WATER 2 G: 40 INJECTION, SOLUTION INTRAVENOUS at 17:14

## 2022-01-01 RX ADMIN — Medication 10 ML: at 21:01

## 2022-01-01 RX ADMIN — FENTANYL CITRATE 75 MCG/HR: 50 INJECTION INTRAMUSCULAR; INTRAVENOUS at 12:41

## 2022-01-01 RX ADMIN — EPOPROSTENOL 50 NG/KG/MIN: 1.5 INJECTION, POWDER, LYOPHILIZED, FOR SOLUTION INTRAVENOUS at 02:53

## 2022-01-01 RX ADMIN — DEXAMETHASONE SODIUM PHOSPHATE 8 MG: 4 INJECTION INTRA-ARTICULAR; INTRALESIONAL; INTRAMUSCULAR; INTRAVENOUS; SOFT TISSUE at 22:00

## 2022-01-01 RX ADMIN — PROPOFOL 20 MCG/KG/MIN: 10 INJECTION, EMULSION INTRAVENOUS at 08:33

## 2022-01-01 RX ADMIN — FENTANYL CITRATE 100 MCG: 50 INJECTION INTRAMUSCULAR; INTRAVENOUS at 11:57

## 2022-01-01 RX ADMIN — PROPOFOL 25 MCG/KG/MIN: 10 INJECTION, EMULSION INTRAVENOUS at 18:11

## 2022-01-01 RX ADMIN — FENTANYL CITRATE 100 MCG/HR: 50 INJECTION INTRAMUSCULAR; INTRAVENOUS at 01:11

## 2022-01-01 RX ADMIN — ACETAMINOPHEN 325 MG: 650 SUSPENSION ORAL at 14:14

## 2022-01-01 RX ADMIN — CHLORHEXIDINE GLUCONATE 15 ML: 1.2 SOLUTION ORAL at 21:17

## 2022-01-01 RX ADMIN — FENTANYL CITRATE 75 MCG/HR: 50 INJECTION INTRAMUSCULAR; INTRAVENOUS at 08:56

## 2022-01-01 RX ADMIN — WHITE PETROLATUM 57.7 %-MINERAL OIL 31.9 % EYE OINTMENT: at 06:23

## 2022-01-01 RX ADMIN — ACETAMINOPHEN 975 MG: 650 SUSPENSION ORAL at 11:32

## 2022-01-01 RX ADMIN — HEPARIN SODIUM 5000 UNITS: 5000 INJECTION INTRAVENOUS; SUBCUTANEOUS at 14:57

## 2022-01-01 RX ADMIN — ACETAMINOPHEN 975 MG: 325 TABLET ORAL at 15:19

## 2022-01-01 RX ADMIN — DEXAMETHASONE SODIUM PHOSPHATE 8 MG: 4 INJECTION INTRA-ARTICULAR; INTRALESIONAL; INTRAMUSCULAR; INTRAVENOUS; SOFT TISSUE at 00:00

## 2022-01-01 RX ADMIN — ACETAMINOPHEN 650 MG: 650 SUSPENSION ORAL at 12:30

## 2022-01-01 RX ADMIN — DEXMEDETOMIDINE HYDROCHLORIDE 0.9 MCG/KG/HR: 400 INJECTION INTRAVENOUS at 08:12

## 2022-01-01 RX ADMIN — ACETAMINOPHEN 650 MG: 650 SUSPENSION ORAL at 06:08

## 2022-01-01 RX ADMIN — DEXMEDETOMIDINE HYDROCHLORIDE 0.4 MCG/KG/HR: 400 INJECTION INTRAVENOUS at 14:33

## 2022-01-01 RX ADMIN — WHITE PETROLATUM 57.7 %-MINERAL OIL 31.9 % EYE OINTMENT: at 05:00

## 2022-01-01 RX ADMIN — POTASSIUM CHLORIDE 20 MEQ: 14.9 INJECTION, SOLUTION INTRAVENOUS at 08:35

## 2022-01-01 RX ADMIN — ACETAMINOPHEN 975 MG: 650 SUSPENSION ORAL at 23:29

## 2022-01-01 RX ADMIN — DEXMEDETOMIDINE HYDROCHLORIDE 0.7 MCG/KG/HR: 400 INJECTION INTRAVENOUS at 16:18

## 2022-01-01 RX ADMIN — NICARDIPINE HYDROCHLORIDE 5 MG/HR: 25 INJECTION, SOLUTION INTRAVENOUS at 12:35

## 2022-01-01 RX ADMIN — DEXMEDETOMIDINE HYDROCHLORIDE 0.8 MCG/KG/HR: 400 INJECTION INTRAVENOUS at 05:54

## 2022-01-01 RX ADMIN — SODIUM CHLORIDE, SODIUM LACTATE, POTASSIUM CHLORIDE, AND CALCIUM CHLORIDE: .6; .31; .03; .02 INJECTION, SOLUTION INTRAVENOUS at 10:44

## 2022-01-01 RX ADMIN — PROPOFOL 15 MCG/KG/MIN: 10 INJECTION, EMULSION INTRAVENOUS at 14:12

## 2022-01-01 RX ADMIN — DEXMEDETOMIDINE HYDROCHLORIDE 0.2 MCG/KG/HR: 400 INJECTION INTRAVENOUS at 21:22

## 2022-01-01 RX ADMIN — POTASSIUM CHLORIDE 40 MEQ: 20 SOLUTION ORAL at 20:33

## 2022-01-01 RX ADMIN — PROPOFOL 40 MCG/KG/MIN: 10 INJECTION, EMULSION INTRAVENOUS at 16:23

## 2022-01-01 RX ADMIN — SODIUM CHLORIDE, SODIUM GLUCONATE, SODIUM ACETATE, POTASSIUM CHLORIDE, MAGNESIUM CHLORIDE, SODIUM PHOSPHATE, DIBASIC, AND POTASSIUM PHOSPHATE 125 ML/HR: .53; .5; .37; .037; .03; .012; .00082 INJECTION, SOLUTION INTRAVENOUS at 15:54

## 2022-01-01 RX ADMIN — HYDRALAZINE HYDROCHLORIDE 10 MG: 20 INJECTION, SOLUTION INTRAMUSCULAR; INTRAVENOUS at 00:07

## 2022-01-01 RX ADMIN — HYDROMORPHONE HYDROCHLORIDE 0.5 MG: 1 INJECTION, SOLUTION INTRAMUSCULAR; INTRAVENOUS; SUBCUTANEOUS at 03:59

## 2022-01-01 RX ADMIN — CHLORHEXIDINE GLUCONATE 15 ML: 1.2 SOLUTION ORAL at 07:53

## 2022-01-01 RX ADMIN — FENTANYL CITRATE 50 MCG: 50 INJECTION, SOLUTION INTRAMUSCULAR; INTRAVENOUS at 18:15

## 2022-01-01 RX ADMIN — CHLORHEXIDINE GLUCONATE 15 ML: 1.2 SOLUTION ORAL at 08:56

## 2022-01-01 RX ADMIN — ACETAMINOPHEN 975 MG: 650 SUSPENSION ORAL at 00:30

## 2022-01-01 RX ADMIN — DEXMEDETOMIDINE HYDROCHLORIDE 1 MCG/KG/HR: 400 INJECTION INTRAVENOUS at 01:01

## 2022-01-01 RX ADMIN — FENTANYL CITRATE 100 MCG/HR: 50 INJECTION INTRAMUSCULAR; INTRAVENOUS at 18:12

## 2022-01-01 RX ADMIN — IOHEXOL 85 ML: 350 INJECTION, SOLUTION INTRAVENOUS at 18:50

## 2022-01-01 RX ADMIN — ACETAMINOPHEN 975 MG: 650 SUSPENSION ORAL at 15:01

## 2022-01-01 RX ADMIN — HYDRALAZINE HYDROCHLORIDE 10 MG: 20 INJECTION, SOLUTION INTRAMUSCULAR; INTRAVENOUS at 08:33

## 2022-01-01 RX ADMIN — FENTANYL CITRATE 75 MCG/HR: 50 INJECTION INTRAMUSCULAR; INTRAVENOUS at 06:16

## 2022-01-01 RX ADMIN — DEXAMETHASONE SODIUM PHOSPHATE 8 MG: 4 INJECTION INTRA-ARTICULAR; INTRALESIONAL; INTRAMUSCULAR; INTRAVENOUS; SOFT TISSUE at 05:57

## 2022-01-01 RX ADMIN — CHLORHEXIDINE GLUCONATE 15 ML: 1.2 SOLUTION ORAL at 09:29

## 2022-01-01 RX ADMIN — IOHEXOL 85 ML: 350 INJECTION, SOLUTION INTRAVENOUS at 14:27

## 2022-01-01 RX ADMIN — LABETALOL HYDROCHLORIDE 10 MG: 5 INJECTION, SOLUTION INTRAVENOUS at 14:03

## 2022-01-01 RX ADMIN — LABETALOL HYDROCHLORIDE 10 MG: 5 INJECTION, SOLUTION INTRAVENOUS at 22:13

## 2022-01-01 RX ADMIN — CEFTRIAXONE 1000 MG: 2 INJECTION, POWDER, FOR SOLUTION INTRAMUSCULAR; INTRAVENOUS at 09:56

## 2022-01-01 RX ADMIN — HEPARIN SODIUM 5000 UNITS: 5000 INJECTION INTRAVENOUS; SUBCUTANEOUS at 06:15

## 2022-01-01 RX ADMIN — ACETAMINOPHEN 650 MG: 650 SUSPENSION ORAL at 13:24

## 2022-01-01 RX ADMIN — FENTANYL CITRATE 100 MCG/HR: 50 INJECTION INTRAMUSCULAR; INTRAVENOUS at 19:04

## 2022-02-26 NOTE — PROCEDURES
Intubation    Date/Time: 2/26/2022 6:14 PM  Performed by: Polly Lynch  Authorized by: Polly Lynch     Patient location:  ED  Other Assisting Provider: Yes (comment) Winter Chavez    Consent:     Consent obtained:  Emergent situation    Consent given by:  Patient  [place] protocol:     Radiology Images displayed and confirmed  If images not available, report reviewed: yes      Required blood products, implants, devices, and special equipment available: yes      Immediately prior to procedure, a time out was called: yes      Patient identity confirmed:  Verbally with patient, arm band and hospital-assigned identification number  Pre-procedure details:     Patient status:  Awake    Pretreatment medications:  Etomidate    Paralytics:  Succinylcholine  Indications:     Indications for intubation: respiratory distress and other (comment)      Indications for intubation comment:  Stridor with impending airway collapse  Procedure details:     Preoxygenation:  Nonrebreather mask    CPR in progress: no      Intubation method:  Oral    Oral intubation technique:  Glidescope    Laryngoscope blade: Mac 3    Tube size (mm):  6 0    Tube type:  Cuffed    Number of attempts:  2    Ventilation between attempts: no      Cricoid pressure: yes      Tube visualized through cords: yes    Placement assessment:     ETT to teeth:  25    Tube secured with:  ETT pittman    Breath sounds:  Equal    Placement verification: CXR verification      CXR findings:  ETT in proper place  Post-procedure details:     Patient tolerance of procedure: Tolerated well, no immediate complications  Comments:      First attempt with 7 0 ETT was unsuccessful 2/2 to subglottic resistance  Second attempt with a 6 0 ETT was successful

## 2022-02-26 NOTE — ED NOTES
Patient states she has soreness on her neck and back due to the fall, states her pain is 8/10 will make Dr Constance Gonzalez, ONRMA  02/26/22 9806

## 2022-02-26 NOTE — EMTALA/ACUTE CARE TRANSFER
[facility] EMERGENCY DEPARTMENT  Aleksander Mckeon  [place] [address]  Dept: [phone]      EMTALA TRANSFER CONSENT    NAME Hayden Mcdermott                                         1945                              MRN [id number]    I have been informed of my rights regarding examination, treatment, and transfer   by Dr Malinda Chun MD    Benefits: Specialized equipment and/or services available at the receiving facility (Include comment)________________________    Risks: Potential for delay in receiving treatment      Transfer Request   I acknowledge that my medical condition has been evaluated and explained to me by the emergency department physician or other qualified medical person and/or my attending physician who has recommended and offered to me further medical examination and treatment  I understand the Hospital's obligation with respect to the treatment and stabilization of my emergency medical condition  I nevertheless request to be transferred  I release the Hospital, the doctor, and any other persons caring for me from all responsibility or liability for any injury or ill effects that may result from my transfer and agree to accept all responsibility for the consequences of my choice to transfer, rather than receive stabilizing treatment at the Hospital  I understand that because the transfer is my request, my insurance may not provide reimbursement for the services  The Hospital will assist and direct me and my family in how to make arrangements for transfer, but the hospital is not liable for any fees charged by the transport service  In spite of this understanding, I refuse to consent to further medical examination and treatment which has been offered to me, and request transfer to  [facility] Name, [address]   I authorize the performance of emergency medical procedures and treatments upon me in both transit and upon arrival at the receiving facility  Additionally, I authorize the release of any and all medical records to the receiving facility and request they be transported with me, if possible  I authorize the performance of emergency medical procedures and treatments upon me in both transit and upon arrival at the receiving facility  Additionally, I authorize the release of any and all medical records to the receiving facility and request they be transported with me, if possible  I understand that the safest mode of transportation during a medical emergency is an ambulance and that the Hospital advocates the use of this mode of transport  Risks of traveling to the receiving facility by car, including absence of medical control, life sustaining equipment, such as oxygen, and medical personnel has been explained to me and I fully understand them  (FADY CORRECT BOX BELOW)  [  ]  I consent to the stated transfer and to be transported by ambulance/helicopter  [  ]  I consent to the stated transfer, but refuse transportation by ambulance and accept full responsibility for my transportation by car  I understand the risks of non-ambulance transfers and I exonerate the Hospital and its staff from any deterioration in my condition that results from this refusal     X___________________________________________    DATE  22  TIME________  Signature of patient or legally responsible individual signing on patient behalf           RELATIONSHIP TO PATIENT_________________________          Provider Certification    NAME Brennon Paez                                         1945                              MRN [id number]    A medical screening exam was performed on the above named patient  Based on the examination:    Condition Necessitating Transfer The primary encounter diagnosis was Head injury   Diagnoses of Closed fracture of multiple cervical vertebrae without spinal cord injury ([facility] [address] ), Closed fracture of nasal bone, initial encounter, and Cephalhematoma were also pertinent to this visit  Patient Condition: The patient has been stabilized such that within reasonable medical probability, no material deterioration of the patient condition or the condition of the unborn child(fish) is likely to result from the transfer    Reason for Transfer: Level of Care needed not available at this facility    Transfer Requirements: [address]   · Space available and qualified personnel available for treatment as acknowledged by    · Agreed to accept transfer and to provide appropriate medical treatment as acknowledged by       Dr Krystin Albarran  · Appropriate medical records of the examination and treatment of the patient are provided at the time of transfer   [address], [address] _______  · Transfer will be performed by qualified personnel from    and appropriate transfer equipment as required, including the use of necessary and appropriate life support measures  Provider Certification: I have examined the patient and explained the following risks and benefits of being transferred/refusing transfer to the patient/family:  General risk, such as traffic hazards, adverse weather conditions, rough terrain or turbulence, possible failure of equipment (including vehicle or aircraft), or consequences of actions of persons outside the control of the transport personnel      Based on these reasonable risks and benefits to the patient and/or the unborn child(fish), and based upon the information available at the time of the patients examination, I certify that the medical benefits reasonably to be expected from the provision of appropriate medical treatments at another medical facility outweigh the increasing risks, if any, to the individuals medical condition, and in the case of labor to the unborn child, from effecting the transfer      X____________________________________________ DATE 02/26/22 TIME_______      ORIGINAL - SEND TO MEDICAL RECORDS   COPY - SEND WITH PATIENT DURING TRANSFER

## 2022-02-26 NOTE — ED NOTES
Pt belongings, Daril Marlena, pants, sweater   Jacket Boots, Pt Baird left with neighbor  Purse with Money,  (3)wallets, cellphone &      Patricia Corral  02/26/22 [address]  02/26/22 4519

## 2022-02-26 NOTE — ED NOTES
Repositioned patient, noted inspiratory wheezing on auscultation and patient c/o of chest pain    Dr Anca Main made aware, awaiting orders to be placed     Estephania Sanders RN  02/26/22 [id number]

## 2022-02-26 NOTE — ED PROVIDER NOTES
History  Chief Complaint   Patient presents with    Fall     States she tripped on a floor mat and fell with head striking wash machine ( dented washer)   Does not think LOC, hematoma forehead, pain neck  Collar applied   Head Injury w/unknown LOC     Occured at [employer] [facility]     Patient states she was walking in appliance store today approximate hour prior to arrival, when she tripped on a mat and fell forward striking her head against a washing machine  Patient does not think she lost consciousness  She struck her forehead and hyper extended her neck causing pain to the back of the neck  She avulsed a cosmetic fingernail in the process  Denies any other injury  She has no loss of consciousness, nausea vomiting, visual changes  Patient was able to drive herself here          Prior to Admission Medications   Prescriptions Last Dose Informant Patient Reported? Taking?   hydrochlorothiazide (HYDRODIURIL) 25 mg tablet   No No   Sig: Take 1 tablet (25 mg total) by mouth daily   losartan (COZAAR) 25 mg tablet   No No   Sig: Take 2 tablets (50 mg total) by mouth daily   metoprolol succinate (TOPROL-XL) 50 mg 24 hr tablet   No No   Sig: Take 1 tablet (50 mg total) by mouth daily      Facility-Administered Medications: None       Past Medical History:   Diagnosis Date    Hypertension        Past Surgical History:   Procedure Laterality Date    NO PAST SURGERIES      SD COLONOSCOPY FLX DX W/COLLJ SPEC WHEN PFRMD N/A 2/2/2017    Procedure: COLONOSCOPY;  Surgeon: South Khalil MD;  Location: [facility]; Service: Gastroenterology       Family History   Family history unknown: Yes     I have reviewed and agree with the history as documented  E-Cigarette/Vaping    E-Cigarette Use Never User      E-Cigarette/Vaping Substances     Social History     Tobacco Use    Smoking status: Former Smoker    Smokeless tobacco: Never Used   Vaping Use    Vaping Use: Never used   Substance Use Topics    Alcohol use:  No  Drug use: No       Review of Systems   Constitutional: Negative for chills and fever  HENT: Negative for congestion, sore throat and trouble swallowing  Eyes: Negative for visual disturbance  Respiratory: Negative for cough and shortness of breath  Cardiovascular: Negative for chest pain  Gastrointestinal: Negative for nausea and vomiting  Genitourinary: Negative for dysuria  Musculoskeletal: Positive for arthralgias, myalgias and neck pain  Skin: Negative for rash  Cephalhematoma   Neurological: Positive for headaches  Negative for dizziness, seizures, syncope and speech difficulty  Hematological: Does not bruise/bleed easily  Psychiatric/Behavioral: Negative for confusion  All other systems reviewed and are negative  Physical Exam  Physical Exam  Vitals and nursing note reviewed  Constitutional:       Appearance: Normal appearance  HENT:      Head:      Comments: Frontal cephalhematoma     Right Ear: External ear normal       Left Ear: External ear normal       Nose: Nose normal       Mouth/Throat:      Mouth: Mucous membranes are moist    Eyes:      Extraocular Movements: Extraocular movements intact  Conjunctiva/sclera: Conjunctivae normal    Neck:      Comments: Patient placed in C-collar on arrival  Cardiovascular:      Rate and Rhythm: Normal rate and regular rhythm  Pulses: Normal pulses  Pulmonary:      Effort: Pulmonary effort is normal       Breath sounds: Normal breath sounds  Abdominal:      Palpations: Abdomen is soft  Tenderness: There is no abdominal tenderness  Musculoskeletal:         General: Normal range of motion  Cervical back: Tenderness present  Skin:     General: Skin is warm and dry  Capillary Refill: Capillary refill takes less than 2 seconds  Neurological:      General: No focal deficit present  Mental Status: She is alert and oriented to person, place, and time        Cranial Nerves: No cranial nerve deficit  Motor: No weakness     Psychiatric:         Mood and Affect: Mood normal          Vital Signs  ED Triage Vitals   Temperature Pulse Respirations Blood Pressure SpO2   02/26/22 1401 02/26/22 1401 02/26/22 1401 02/26/22 1401 02/26/22 1401   (!) 96 9 °F (36 1 °C) 85 18 (!) 226/99 98 %      Temp Source Heart Rate Source Patient Position - Orthostatic VS BP Location FiO2 (%)   02/26/22 1401 02/26/22 1401 02/26/22 1401 02/26/22 1401 --   Tympanic Monitor Sitting Left arm       Pain Score       02/26/22 1356       6           Vitals:    02/26/22 1401 02/26/22 1439 02/26/22 1547 02/26/22 1627   BP: (!) 226/99 (!) 212/95 (!) 211/109 (!) 195/108   Pulse: 85   103   Patient Position - Orthostatic VS: Sitting   Lying         Visual Acuity  Visual Acuity      Most Recent Value   L Pupil Size (mm) 3   R Pupil Size (mm) 3          ED Medications  Medications   ipratropium-albuterol (DUO-NEB) 0 5-2 5 mg/3 mL inhalation solution 3 mL (3 mL Nebulization Given 2/26/22 1650)   morphine (PF) 4 mg/mL injection 4 mg (4 mg Intravenous Given 2/26/22 1650)       Diagnostic Studies  Results Reviewed     Procedure Component Value Units Date/Time    Comprehensive metabolic panel [[id number]]  (Abnormal) Collected: 02/26/22 1531    Lab Status: Final result Specimen: Blood from Arm, Left Updated: 02/26/22 1627     Sodium 138 mmol/L      Potassium 3 4 mmol/L      Chloride 106 mmol/L      CO2 27 mmol/L      ANION GAP 5 mmol/L      BUN 31 mg/dL      Creatinine 1 00 mg/dL      Glucose 104 mg/dL      Calcium 8 6 mg/dL      Corrected Calcium 10 0 mg/dL      AST 32 U/L      ALT 63 U/L      Alkaline Phosphatase 98 U/L      Total Protein 11 6 g/dL      Albumin 2 2 g/dL      Total Bilirubin 0 43 mg/dL      eGFR 54 ml/min/1 73sq m     Narrative:      Dolores guidelines for Chronic Kidney Disease (CKD):     Stage 1 with normal or high GFR (GFR > 90 mL/min/1 73 square meters)    Stage 2 Mild CKD (GFR = 60-89 mL/min/1 73 square meters)    Stage 3A Moderate CKD (GFR = 45-59 mL/min/1 73 square meters)    Stage 3B Moderate CKD (GFR = 30-44 mL/min/1 73 square meters)    Stage 4 Severe CKD (GFR = 15-29 mL/min/1 73 square meters)    Stage 5 End Stage CKD (GFR <15 mL/min/1 73 square meters)  Note: GFR calculation is accurate only with a steady state creatinine    Protime-INR [[id number]]  (Normal) Collected: 02/26/22 1545    Lab Status: Final result Specimen: Blood from Arm, Right Updated: 02/26/22 1602     Protime 14 2 seconds      INR 1 12    APTT [[id number]]  (Normal) Collected: 02/26/22 1545    Lab Status: Final result Specimen: Blood from Arm, Right Updated: 02/26/22 1602     PTT 28 seconds     CBC and differential [[id number]]  (Abnormal) Collected: 02/26/22 1531    Lab Status: Final result Specimen: Blood from Arm, Left Updated: 02/26/22 1542     WBC 6 67 Thousand/uL      RBC 2 83 Million/uL      Hemoglobin 9 2 g/dL      Hematocrit 29 5 %       fL      MCH 32 5 pg      MCHC 31 2 g/dL      RDW 13 3 %      MPV 11 9 fL      Platelets 346 Thousands/uL      nRBC 0 /100 WBCs      Neutrophils Relative 65 %      Immat GRANS % 1 %      Lymphocytes Relative 26 %      Monocytes Relative 6 %      Eosinophils Relative 1 %      Basophils Relative 1 %      Neutrophils Absolute 4 32 Thousands/µL      Immature Grans Absolute 0 05 Thousand/uL      Lymphocytes Absolute 1 74 Thousands/µL      Monocytes Absolute 0 42 Thousand/µL      Eosinophils Absolute 0 08 Thousand/µL      Basophils Absolute 0 06 Thousands/µL                  CT head without contrast   Final Result by Katerin Zabala MD (02/26 1451)      No acute intracranial abnormality  Left nasal bone depressed comminuted fractures  Soft tissue swelling over the left infraorbital, nasal and supraorbital regions  The study was marked in [facility] for immediate notification              Workstation performed: [id number]         CT cervical spine without contrast   Final Result by Noa Cuellar MD (02/26 1521)      Extensive multilevel degenerative disc disease as above  Nondisplaced fractures in the lamina bilaterally and spinous process at C5 and C6  Displaced ossific focus anterior to the C6-7 disc space suggesting avulsed displaced anterior osteophyte  Large soft tissue abnormality anterior to the C6 and C7 likely represent prevertebral hematoma  I personally discussed this study with Ferdm Madison on 2/26/2022 at 3:21 PM                       Workstation performed: [id number]                    Procedures  ECG 12 Lead Documentation Only    Date/Time: 2/26/2022 3:29 PM  Performed by: Maral Cordova MD  Authorized by: Maral Cordova MD     Indications / Diagnosis:  Trauma  ECG reviewed by me, the ED Provider: yes    Patient location:  ED  Interpretation:     Interpretation: abnormal    Rate:     ECG rate:  97    ECG rate assessment: normal    Rhythm:     Rhythm: sinus rhythm    Ectopy:     Ectopy: PAC    QRS:     QRS axis:  Normal    QRS intervals:  Normal  Conduction:     Conduction: normal    ST segments:     ST segments:  Normal  T waves:     T waves: normal    Other findings:     Other findings: poor R wave progression               ED Course                               SBIRT 20yo+      Most Recent Value   SBIRT (22 yo +)    In order to provide better care to our patients, we are screening all of our patients for alcohol and drug use  Would it be okay to ask you these screening questions? No Filed at: 02/26/2022 1439                    [facility]  Number of Diagnoses or Management Options  Cephalhematoma  Closed fracture of multiple cervical vertebrae without spinal cord injury ([address] )  Closed fracture of nasal bone, initial encounter  Head injury  Diagnosis management comments: Frontal injury with supple or hematoma, extension injury of the neck  Will CT scan      CT findings discussed with radiologist and with patient  Will transfer to trauma      Disposition  Final diagnoses:   Head injury   Closed fracture of multiple cervical vertebrae without spinal cord injury ([facility] [address] )   Closed fracture of nasal bone, initial encounter   Cephalhematoma     Time reflects when diagnosis was documented in both MDM as applicable and the Disposition within this note     Time User Action Codes Description Comment    2/26/2022  3:27 PM Yuliya Cousin Add [S09 90XA] Head injury     2/26/2022  3:28 PM Yuliya Cousin Add [facility]  9XXA] Closed fracture of multiple cervical vertebrae without spinal cord injury ([facility] [address] )     2/26/2022  3:28 PM Yuliya Cousin Add [S02  2XXA] Closed fracture of nasal bone, initial encounter     2/26/2022  3:28 PM Yuliya Cousin Add [P12 0] Cephalhematoma       ED Disposition     ED Disposition Condition Date/Time Comment    Transfer to Another Facility-In Network  XYR Feb 26, 2022  3:27 PM Shellie Hernández should be transferred out to [facility]        MD Documentation      Most Recent Value   Patient Condition The patient has been stabilized such that within reasonable medical probability, no material deterioration of the patient condition or the condition of the unborn child(fish) is likely to result from the transfer   Reason for Transfer Level of Care needed not available at this facility   Benefits of Transfer Specialized equipment and/or services available at the receiving facility (Include comment)________________________   Risks of Transfer Potential for delay in receiving treatment   Accepting Physician Dr Yi Yoon Name, Reed Ca   Provider Certification General risk, such as traffic hazards, adverse weather conditions, rough terrain or turbulence, possible failure of equipment (including vehicle or aircraft), or consequences of actions of persons outside the control of the transport personnel      RN Documentation      Most Recent Value   Accepting Facility [facility], [facility]   Bed Assignment One [facility]   Report Given to Som Au RN   Medications Reviewed with Next Provider of Service No   Transport Mode Ambulance   Level of Care Advanced life support   Copies of Medical Records Sent Other (comment)  [[place]:  Medical necessity, face sheet]   Transfer Date 02/26/22   Transfer Time 1645  [to be picked up by [facility] at 4:45 pm]      Follow-up Information    None         Patient's Medications   Discharge Prescriptions    No medications on file       No discharge procedures on file      PDMP Review     None          ED Provider  Electronically Signed by           Hector Delaney MD  02/26/22 4065

## 2022-02-26 NOTE — ED NOTES
Car keys given to neighbor  Anu Pete patient called, and rest of her  belongings in the narrative sent with Elvin tucker to [facility]  Report given to [facility] in [facility]       Amberly Morrell RN  02/26/22 1748

## 2022-02-26 NOTE — RESPIRATORY THERAPY NOTE
RT Protocol Note  Justine Boogie 64 y o  female MRN: [id number]  Unit/Bed#: ED 22 Encounter: [id number]    Assessment    Active Problems:    * No active hospital problems  *      Home Pulmonary Medications:  NA       Past Medical History:   Diagnosis Date    Hypertension      Social History     Socioeconomic History    Marital status:      Spouse name: None    Number of children: None    Years of education: None    Highest education level: None   Occupational History    None   Tobacco Use    Smoking status: Former Smoker    Smokeless tobacco: Never Used   Vaping Use    Vaping Use: Never used   Substance and Sexual Activity    Alcohol use: No    Drug use: No    Sexual activity: None   Other Topics Concern    None   Social History Narrative    Daily caffeine consumption 2-3 servings a day     Social Determinants of Health     Financial Resource Strain: Not on file   Food Insecurity: Not on file   Transportation Needs: Not on file   Physical Activity: Not on file   Stress: Not on file   Social Connections: Not on file   Intimate Partner Violence: Not on file   Housing Stability: Not on file       Subjective         Objective    Physical Exam:        Vitals:  Blood pressure 163/95, pulse 101, resp  rate 18, SpO2 100 %  Imaging and other studies: I have personally reviewed pertinent reports  Plan             Resp Comments: Called to pts bedside for intubation  Pt was intubated by ED PA w/o incident  Pt was intubated w/ a 6 0 ett @ 22 @ the teeth  Pt is tollerating well @ this time

## 2022-02-27 PROBLEM — S10.93XA HEMATOMA OF NECK: Status: ACTIVE | Noted: 2022-01-01

## 2022-02-27 PROBLEM — S12.9XXA CERVICAL SPINE FRACTURE (HCC): Status: ACTIVE | Noted: 2022-01-01

## 2022-02-27 NOTE — PROGRESS NOTES
Daily Progress Note - [facility] [place],[address] 64 y o  female MRN: [id number]  Unit/Bed#: ICU 09 Encounter: [id number]        ----------------------------------------------------------------------------------------  HPI/24hr events: Rosina Slaughter is a 64 y o  female with h/o hypertension who presents after she fell  She presented to [facility] ED by driving herself  CT scan revealed "Nondisplaced fractures in the lamina bilaterally and spinous process at C5 and C6   Displaced ossific focus anterior to the C6-7 disc space suggesting avulsed displaced anterior osteophyte  Large soft tissue abnormality anterior to the C6 and C7 likely represent prevertebral hematoma " She was intubated in ED [facility] for airway protection  CTA showed active arterial extravasation within hematoma anterior to the C6-7 disc space narrowing the trachea  Angiogram was performed by neurosurgery, which revealed no extravasation     ---------------------------------------------------------------------------------------  SUBJECTIVE  Intubated    Review of Systems   Unable to perform ROS: Intubated     Review of systems was unable to be performed secondary to Intubation  ---------------------------------------------------------------------------------------  Assessment and Plan:    Neuro:   · Diagnosis: Sedation  · Currently on [email or web address], propofol@15  ? Plan: Continue sedation, consider decreasing fentanyl     CV:   · Diagnosis: h/o hypertension  ? Plan: Will hold home meds  Beta-blocker PRN        Pulm:  · Diagnosis: Acute respiratory failure due to cervical prevertebral hematoma  ? Intubated at ED 2/26  ? Currently on ACVC 14/400/50%/PEEP6  ? Plan:   ? Continue ventilator management           GI:   · Diagnosis: No active issues  ? Plan: continue IV pepcid        :   · Diagnosis: No active problems  · UOP 35ml/hr  ?  Plan: Strict I/O        F/E/N:   · Plan: [email or web address], replete electrolytes if needed, NPO        Heme/Onc:   ? Diagnosis: Large soft tissue abnormality anterior to the C6 and C7 represent prevertebral hematoma  ? Plan: Hb6 8 will transfuse 1 unit of pRBC  hold chemical DVT prophylaxis         Endo:   ? Diagnosis: no active problems        ID:   · Diagnosis: No active problems  ? Plan: Monitor fever curve     MSK/Skin:   · Diagnosis: Nondisplaced fractures in the lamina bilaterally and spinous process at C5 and C6   Displaced ossific focus anterior to the C6-7 disc space suggesting avulsed displaced anterior osteophyte  Large soft tissue abnormality anterior to the C6 and C7 represent prevertebral hematoma with active extravasation  ? Plan: Neurosurgery contacted immediately  ? Angiogram 2/26 - no active bleeding  ? F/u MRI  ? Continue neck collar  ? Appreciate recommendation per neurosurgery  Diagnosis: Left nasal bone depressed comminuted fractures                    consider OMFS consult  Patient appropriate for transfer out of the ICU today?: No  Disposition: Continue Critical Care   Code Status: Level 1 - Full Code  ---------------------------------------------------------------------------------------  ICU CORE MEASURES    Prophylaxis   VTE Pharmacologic Prophylaxis: Pharmacologic VTE Prophylaxis contraindicated due to bleeding  VTE Mechanical Prophylaxis: sequential compression device  Stress Ulcer Prophylaxis: Famotidine IV     ABCDE Protocol (if indicated)  Plan to perform spontaneous awakening trial today? No  Plan to perform spontaneous breathing trial today? No  Obvious barriers to extubation?  No  CAM-ICU: Negative    Invasive Devices Review  Invasive Devices  Report    Peripheral Intravenous Line            Peripheral IV Right Forearm -- days    Peripheral IV 02/26/22 Proximal;Right;Ventral (anterior) Forearm <1 day          Arterial Line            Arterial Line 02/26/22 Left Radial <1 day          Drain            Urethral Catheter Temperature probe 16 Fr  <1 day          Airway            ETT  Cuffed 6 mm -- days Can any invasive devices be discontinued today? No  ---------------------------------------------------------------------------------------  OBJECTIVE    Vitals   Vitals:    22 2230 22 2245 22 2300 22 2315   BP: 159/88 151/82 154/84    BP Location:       Pulse: 72 78 76 76   Resp: 14 14 14 14   Temp:   (!) 97 °F (36 1 °C)    SpO2: 100% 100% 100% 100%     Temp (24hrs), Av 9 °F (36 1 °C), Min:96 5 °F (35 8 °C), Max:97 °F (36 1 °C)  Current: Temperature: (!) 97 °F (36 1 °C)  HR: 78  BP: 166/68  RR: 12  SpO2: 100    Respiratory:  SpO2: SpO2: 100 %  Nasal Cannula O2 Flow Rate (L/min): 2 L/min    Invasive/non-invasive ventilation settings   Respiratory  Report   Lab Data (Last 4 hours)    None         O2/Vent Data (Last 4 hours)       2216           Vent Mode AC/VC       Resp Rate (BPM) (BPM) 14       Vt (mL) (mL) 400       FIO2 (%) (%) 100       PEEP (cmH2O) (cmH2O) 6       MV 6 28                   Physical Exam  Vitals and nursing note reviewed  Constitutional:       Appearance: She is normal weight  Comments: Intubated   HENT:      Head:      Comments: Left face abrasion     Right Ear: External ear normal       Left Ear: External ear normal       Nose: Nose normal       Mouth/Throat:      Mouth: Mucous membranes are moist       Pharynx: Oropharynx is clear  Eyes:      Extraocular Movements: Extraocular movements intact  Conjunctiva/sclera: Conjunctivae normal       Pupils: Pupils are equal, round, and reactive to light  Neck:      Comments: In neck collar  Cardiovascular:      Rate and Rhythm: Normal rate and regular rhythm  Pulses: Normal pulses  Heart sounds: Normal heart sounds  Pulmonary:      Effort: Pulmonary effort is normal       Breath sounds: Normal breath sounds  Abdominal:      General: Abdomen is flat  Bowel sounds are normal       Palpations: Abdomen is soft  Musculoskeletal:         General: Normal range of motion     Skin: General: Skin is warm and dry  Capillary Refill: Capillary refill takes less than 2 seconds  Neurological:      General: No focal deficit present  Mental Status: She is oriented to person, place, and time  Sensory: Sensation is intact  Motor: Motor function is intact  Comments: sedated   Psychiatric:         Mood and Affect: Mood normal       Comments: sedated             Laboratory and Diagnostics:  Results from last 7 days   Lab Units 02/26/22  1531   WBC Thousand/uL 6 67   HEMOGLOBIN g/dL 9 2*   HEMATOCRIT % 29 5*   PLATELETS Thousands/uL 173   NEUTROS PCT % 65   MONOS PCT % 6     Results from last 7 days   Lab Units 02/26/22  1531   SODIUM mmol/L 138   POTASSIUM mmol/L 3 4*   CHLORIDE mmol/L 106   CO2 mmol/L 27   ANION GAP mmol/L 5   BUN mg/dL 31*   CREATININE mg/dL 1 00   CALCIUM mg/dL 8 6   GLUCOSE RANDOM mg/dL 104   ALT U/L 63   AST U/L 32   ALK PHOS U/L 98   ALBUMIN g/dL 2 2*   TOTAL BILIRUBIN mg/dL 0 43          Results from last 7 days   Lab Units 02/26/22  1545   INR  1 12   PTT seconds 28              ABG:    VBG:          Micro        EKG: NSR      Intake and Output  I/O       02/25 0701  02/26 0700 02/26 0701  02/27 0700    I  V   500    Total Intake  500    Urine  825    Total Output  825    Net  -325              UOP: 35 ml/hr     Height and Weights         There is no height or weight on file to calculate BMI  Weight (last 2 days)     None            Nutrition       Diet Orders   (From admission, onward)             Start     Ordered    02/26/22 1942  Diet NPO  Diet effective now        References:    Nutrtion Support Algorithm Enteral vs  Parenteral   Question Answer Comment   Diet Type NPO    RD to adjust diet per protocol?  No        02/26/22 1949                  Active Medications  Scheduled Meds:  Current Facility-Administered Medications   Medication Dose Route Frequency Provider Last Rate    chlorhexidine  15 mL Mouth/Throat Q12H [address] MD Keisha Dozier dexamethasone  8 mg Intravenous Q8H [address] Aviva Leach,       dexamethasone  8 mg Intravenous Once PRN Hector Sluder, CRNA      diphenhydrAMINE  12 5 mg Intravenous Once PRN Hector Sluder, CRNA      fentaNYL  25 mcg Intravenous Q3 min PRN Sunset Sluder, CRNA      fentaNYL  100 mcg/hr Intravenous Continuous Jenn Dimas  mcg/hr (02/26/22 2214)    fentanyl citrate (PF)  50 mcg Intravenous Q1H PRN Darrall Bibi      lactated ringers  125 mL/hr Intravenous Continuous Flip Mills MD      metoclopramide  10 mg Intravenous Once PRN Sunset Sluder, CRNA      propofol  5-50 mcg/kg/min Intravenous Titrated Jenn Dimas MD 40 mcg/kg/min (02/26/22 2221)     Continuous Infusions:  fentaNYL, 100 mcg/hr, Last Rate: 100 mcg/hr (02/26/22 2214)  lactated ringers, 125 mL/hr  propofol, 5-50 mcg/kg/min, Last Rate: 40 mcg/kg/min (02/26/22 2221)      PRN Meds:   dexamethasone, 8 mg, Once PRN  diphenhydrAMINE, 12 5 mg, Once PRN  fentaNYL, 25 mcg, Q3 min PRN  fentanyl citrate (PF), 50 mcg, Q1H PRN  metoclopramide, 10 mg, Once PRN        Allergies   No Known Allergies  ---------------------------------------------------------------------------------------  Advance Directive and Living Will:      Power of :    POLST:    ---------------------------------------------------------------------------------------  Care Time Delivered:   Upon my evaluation, this patient had a high probability of imminent or life-threatening deterioration due to respiratory failure, which required my direct attention, intervention, and personal management  I have personally provided 30 minutes (2:00 to 2:30) of critical care time, exclusive of procedures, teaching, family meetings, and any prior time recorded by providers other than myself  Flip Mills MD      Portions of the record may have been created with voice recognition software    Occasional wrong word or "sound a like" substitutions may have occurred due to the inherent limitations of voice recognition software    Read the chart carefully and recognize, using context, where substitutions have occurred

## 2022-02-27 NOTE — RESPIRATORY THERAPY NOTE
RT Ventilator Management Note  Lucho Mensah 64 y o  female MRN: [id number]  Unit/Bed#: ED 22 Encounter: [id number]       02/26/22 1917   Respiratory Assessment   Assessment Type Assess only   General Appearance Sedated   Respiratory Pattern Labored   Chest Assessment Chest expansion symmetrical   Bilateral Breath Sounds Clear   Resp Comments Pt intubated shortly after arrival from [facility] ED with prior shift's RT present  Pt immediately sedated and ventilated and taken to CT  ET tube advanced 3 cm to it's current location at end of prior shift  Pt fell, hit her head and insp stridor heard just prior to being intubated  1st routine vent check post transport back from CT to ED 22  Awaiting ultimate transfer to 6 th floor ICU  Vent alarms on and functional   BS's equal and clear  All weight taken off of the ET tube vent adaptor  Airway cart brought into ED 22 from trauma bay just in case other alternative airway ever needed  Pt sedated  Instructions clearly given to this RT and all others to guard the airway  Will continue to monitor per policy     O2 Device  vent   Vent Information   Vent ID [id number]   Vent type     Vent Mode AC/VC   $ Pulse Oximetry Spot Check Charge Completed   AC/VC Settings   Resp Rate (BPM) 14 BPM   Vt (mL) 400 mL   FIO2 (%) 100 %   PEEP (cmH2O) 6 cmH2O   Flow Pattern (LPM) 45 L/min   Trigger Sensitivity Flow (lpm) 3 %   Humidification Heater   Heater Temperature (Set) 96 3 °F (35 7 °C)   AC/VC Actuals   Resp Rate (BPM) 14 BPM   VT (mL) 466   MV 6 52   MAP (cmH2O) 12 cmH2O   Peak Pressure (cmH2O) 38 cmH2O   I/E Ratio (Obs) 1:3 5   Heater Temperature (Obs) 95 °F (35 °C)   AC/VC Alarms   High Peak Pressure (cmH2O) 50   High Resp Rate (BPM) 40 BPM   High MV (L/min) 20 L/min   Low MV (L/min) 3 L/min   Vt High (mL) 1000 mL   Vt Low (mL) 225 mL   AC/VC Apnea Settings   Resp Rate (BPM) 14 BPM   VT (mL) 450 mL   FIO2 (%) 100 %   Apnea Time (s) 20 S   Apnea Flow (L/min) 45 L/min Maintenance   Alarm (pink) cable attached No   Resuscitation bag with peep valve at bedside Yes   Water bag changed No   Circuit changed No   ETT  Cuffed 6 mm   No placement date or time found  Preoxygenated: Yes  Technique: Direct laryngoscopy  Type: Cuffed  Tube Size: 6 mm  Location: Oral  Insertion attempts: 3  Placement Verification: End tidal CO2;Fiberoptic visualization  Secured at (cm): 25  Comments:    Secured at (cm) 25   Measured from Teeth   Secured Location Right   Secured by Commercial tube pittman   Site Condition Cool;Dry       Daily Screen    No data found in the last 10 encounters  Physical Exam:   Assessment Type: Assess only  General Appearance: Sedated  Respiratory Pattern: Labored  Chest Assessment: Chest expansion symmetrical  Bilateral Breath Sounds: Clear  O2 Device:  vent      Resp Comments: Pt intubated shortly after arrival from [facility] ED with prior shift's RT present  Pt immediately sedated and ventilated and taken to CT  ET tube advanced 3 cm to it's current location at end of prior shift  Pt fell, hit her head and insp stridor heard just prior to being intubated  1st routine vent check post transport back from CT to ED 22  Awaiting ultimate transfer to 6 th floor ICU  Vent alarms on and functional   BS's equal and clear  All weight taken off of the ET tube vent adaptor  Airway cart brought into ED 22 from trauma bay just in case other alternative airway ever needed  Pt sedated  Instructions clearly given to this RT and all others to guard the airway  Will continue to monitor per policy

## 2022-02-27 NOTE — TREATMENT PLAN
On-Call Telephone Note    Contacted by Dr Shazia Osei with trauma at [facility] AND [facility]  Richardine Carrel 64 y o  female MRN: [id number]  Unit/Bed#: ED 22 Encounter: [id number]    Per provider report, patient presents as a transfer from [facility] post trip on floor mat with head strike against washing machine  Unfortunately patient developed anterior neck swelling and stridor requiring emergent intubation  Imaging significant for C5-6 fracture with anterior widening concerning for anterior ligament injury along with active extravasation  Available past medical history,social history, surgical history, medication list, drug allergies and review of systems were reviewed  /86 (BP Location: Left arm)   Pulse 91   Resp 14   LMP  (LMP Unknown)   SpO2 100%      Clinical exam per provider report, patient was able to walk herself into the emergency room and state events leading to her arrival   During time in emergency room she began complaining of increasing neck pain, inspiratory wheezing, chest pain with eventual anterior neck swelling and stridor resulting in emergent intubation    Imaging personally reviewed  · CTA neck with and without contrast 02/26/2022: There is an enlarging, large prevertebral hematoma within the cervical spine tracking inferiorly into the upper mediastinum  This is displacing the trachea anteriorly and resulting in moderate tracheal narrowing at the level of the tip of the endotracheal tube  There is brisk active arterial extravasation of contrast noted within the prevertebral hematoma immediately anterior to the C6-7 disc space, corresponding to a site of active bleeding  There is now widening of the disc space of C6-7 anteriorly, not present on the initial CT scan performed approximately 4 hours ago consistent with anterior longitudinal ligament disruption  Small avulsion fracture suspected  Stable posterior element fractures involving C5 and C6  No subluxation    · CT spine cervical wo contrast 2/26/2022:  Extensive multilevel degenerative disc disease as above  Nondisplaced fractures in the lamina bilaterally and spinous process at C5 and C6  Displaced ossific focus anterior to the C6-7 disc space suggesting avulsed displaced anterior osteophyte  Large soft tissue abnormality anterior to the C6 and C7 likely represent prevertebral hematoma  Assessment and Plan  1  Patient found to have nondisplaced fractures of bilateral lamina and spinous processes at C5-6 as well as displaced ossific focus anterior to the C6-7 disc space suggesting a balled displaced anterior osteophyte with worsening anterior widening at C6-7 concerning for anterior ligament injury  2  She is also noted to have active arterial extravasation within the prevertebral hematoma immediately anterior to the C6-7 disc space causing tracheal narrowing requiring emergent intubation   3  Stat consult to IR for treatment of active extravasation/hematoma management   4  After stabilization of extravasation patient will need to undergo MRI cervical spine without contrast for evaluation of anterior ligament injury   5  In the meantime she is to remain in [employer] collar at all times   6  Strict spinal precautions  7  Defer mobilization at this time  8  Full consultation to follow  9  Please call with questions or concerns, plan discussed with primary team as well as Neurosurgery attending    All questions answered  Provider is in agreement with the course of action  A total of 15 minutes was spent discussing the presentation, physical exam and formulating a management plan with the provider

## 2022-02-27 NOTE — RESPIRATORY THERAPY NOTE
RT Ventilator Management Note  Beckie Dan 64 y o  female MRN: [id number]  Unit/Bed#: ED 22 Encounter: [id number]        Daily Screen    No data found in the last 10 encounters  Physical Exam:   Assessment Type: Assess only  General Appearance: Sedated  Respiratory Pattern: Labored  Chest Assessment: Chest expansion symmetrical  Bilateral Breath Sounds: Clear  O2 Device:  vent      Resp Comments: Pt intubated shortly after arrival from [place] ED with prior shift's RT present  Pt immediately sedated and ventilated and taken to CT  ET tube advanced 3 cm to it's current location at end of prior shift  Pt fell, hit her head and insp stridor heard just prior to being intubated  1st routine vent check post transport back from CT to ED 22  Awaiting ultimate transfer to 6 th floor ICU  Vent alarms on and functional   BS's equal and clear  All weight taken off of the ET tube vent adaptor  Airway cart brought into ED 22 from trauma bay just in case other alternative airway ever needed  Pt sedated  Instructions clearly given to this RT and all others to guard the airway  Will continue to monitor per policy

## 2022-02-27 NOTE — RESPIRATORY THERAPY NOTE
RT Ventilator Management Note  Suhas Ureña 64 y o  female MRN: [id number]  Unit/Bed#: ICU 09 Encounter: [id number]      Daily Screen       2/27/2022 0726 2/27/2022  0730          Patient safety screen outcome[de-identified] Failed Failed      Not Ready for Weaning due to[de-identified] Underline problem not resolved Underline problem not resolved              Physical Exam:   Respiratory Pattern: (P) Assisted  Chest Assessment: (P) Chest expansion symmetrical  Bilateral Breath Sounds: (P) Clear      Resp Comments: pt transported from MRI to ICU on transport vent and is placed on [id number] ventilator at this time

## 2022-02-27 NOTE — PROGRESS NOTES
Called for neck hematoma with active extrav on cta  Concern for airway impingement distal to ET tube  Emergent consent implied for angiogram and possible embolization given no family present

## 2022-02-27 NOTE — PLAN OF CARE
Problem: MOBILITY - ADULT  Goal: Maintain or return to baseline ADL function  Description: INTERVENTIONS:  -  Assess patient's ability to carry out ADLs; assess patient's baseline for ADL function and identify physical deficits which impact ability to perform ADLs (bathing, care of mouth/teeth, toileting, grooming, dressing, etc )  - Assess/evaluate cause of self-care deficits   - Assess range of motion  - Assess patient's mobility; develop plan if impaired  - Assess patient's need for assistive devices and provide as appropriate  - Encourage maximum independence but intervene and supervise when necessary  - Involve family in performance of ADLs  - Assess for home care needs following discharge   - Consider OT consult to assist with ADL evaluation and planning for discharge  - Provide patient education as appropriate  Outcome: Progressing  Goal: Maintains/Returns to pre admission functional level  Description: INTERVENTIONS:  - Perform BMAT or MOVE assessment daily    - Set and communicate daily mobility goal to care team and patient/family/caregiver  - Collaborate with rehabilitation services on mobility goals if consulted  - Perform Range of Motion multiple times a day  - Reposition patient every 2 hours    - Record patient progress and toleration of activity level   Outcome: Progressing     Problem: Prexisting or High Potential for Compromised Skin Integrity  Goal: Skin integrity is maintained or improved  Description: INTERVENTIONS:  - Identify patients at risk for skin breakdown  - Assess and monitor skin integrity  - Assess and monitor nutrition and hydration status  - Monitor labs   - Assess for incontinence   - Turn and reposition patient  - Assist with mobility/ambulation  - Relieve pressure over bony prominences  - Avoid friction and shearing  - Provide appropriate hygiene as needed including keeping skin clean and dry  - Evaluate need for skin moisturizer/barrier cream  - Collaborate with interdisciplinary team   - Patient/family teaching  - Consider wound care consult   Outcome: Progressing     Problem: PAIN - ADULT  Goal: Verbalizes/displays adequate comfort level or baseline comfort level  Description: Interventions:  - Encourage patient to monitor pain and request assistance  - Assess pain using appropriate pain scale  - Administer analgesics based on type and severity of pain and evaluate response  - Implement non-pharmacological measures as appropriate and evaluate response  - Consider cultural and social influences on pain and pain management  - Notify physician/advanced practitioner if interventions unsuccessful or patient reports new pain  Outcome: Progressing     Problem: INFECTION - ADULT  Goal: Absence or prevention of progression during hospitalization  Description: INTERVENTIONS:  - Assess and monitor for signs and symptoms of infection  - Monitor lab/diagnostic results  - Monitor all insertion sites, i e  indwelling lines, tubes, and drains  - Monitor endotracheal if appropriate and nasal secretions for changes in amount and color  - [place] appropriate cooling/warming therapies per order  - Administer medications as ordered  - Instruct and encourage patient and family to use good hand hygiene technique  - Identify and instruct in appropriate isolation precautions for identified infection/condition  Outcome: Progressing  Goal: Absence of fever/infection during neutropenic period  Description: INTERVENTIONS:  - Monitor WBC    Outcome: Progressing     Problem: SAFETY ADULT  Goal: Maintain or return to baseline ADL function  Description: INTERVENTIONS:  -  Assess patient's ability to carry out ADLs; assess patient's baseline for ADL function and identify physical deficits which impact ability to perform ADLs (bathing, care of mouth/teeth, toileting, grooming, dressing, etc )  - Assess/evaluate cause of self-care deficits   - Assess range of motion  - Assess patient's mobility; develop plan if impaired  - Assess patient's need for assistive devices and provide as appropriate  - Encourage maximum independence but intervene and supervise when necessary  - Involve family in performance of ADLs  - Assess for home care needs following discharge   - Consider OT consult to assist with ADL evaluation and planning for discharge  - Provide patient education as appropriate  Outcome: Progressing  Goal: Maintains/Returns to pre admission functional level  Description: INTERVENTIONS:  - Perform BMAT or MOVE assessment daily    - Set and communicate daily mobility goal to care team and patient/family/caregiver     - Record patient progress and toleration of activity level   Outcome: Progressing  Goal: Patient will remain free of falls  Description: INTERVENTIONS:  -  Assess patient's ability to carry out ADLs; assess patient's baseline for ADL function and identify physical deficits which impact ability to perform ADLs (bathing, care of mouth/teeth, toileting, grooming, dressing, etc )  - Assess/evaluate cause of self-care deficits   - Assess range of motion  - Assess patient's mobility; develop plan if impaired  - Assess patient's need for assistive devices and provide as appropriate  - Encourage maximum independence but intervene and supervise when necessary  - Involve family in performance of ADLs  - Assess for home care needs following discharge   - Consider OT consult to assist with ADL evaluation and planning for discharge  - Provide patient education as appropriate  Outcome: Progressing     Problem: DISCHARGE PLANNING  Goal: Discharge to home or other facility with appropriate resources  Description: INTERVENTIONS:  - Identify barriers to discharge w/patient and caregiver  - Arrange for needed discharge resources and transportation as appropriate  - Identify discharge learning needs (meds, wound care, etc )  - Arrange for interpretive services to assist at discharge as needed  - Refer to Case Management Department for coordinating discharge planning if the patient needs post-hospital services based on physician/advanced practitioner order or complex needs related to functional status, cognitive ability, or social support system  Outcome: Progressing     Problem: Knowledge Deficit  Goal: Patient/family/caregiver demonstrates understanding of disease process, treatment plan, medications, and discharge instructions  Description: Complete learning assessment and assess knowledge base    Interventions:  - Provide teaching at level of understanding  - Provide teaching via preferred learning methods  Outcome: Progressing

## 2022-02-27 NOTE — OP NOTE
OPERATIVE REPORT  PATIENT NAME: Suhas Ureña     :  1945  MRN: [id number]   Pt Location: Interventional radiology    SURGERY DATE: 22     Preop Diagnosis:  1  Cervical fracture  2  Prevertebral hematoma with airway compression and extravasation on CTA    Postop Diagnosis  1  Cervical fracture  2  Prevertebral hematoma with airway compression and extravasation on CTA    Procedure:  Right Common Carotid Arteriogram  Right Vertebral Artery Arteriogram  Right Subclavian Artery Arteriogram  Right Thyrocervical and costocervical ateriograms  Left Common Carotid Arteriogram  Left External Carotid Arteriogram  Left Vertebral Artery Arteriogram  Left Thyrocervical and costocervical ateriograms  Aorta archogram  Limited femoral angiorgram    Surgeon:   Clyde De La Vega MD    Specimen(s):  None    Estimated Blood Loss:   None    Drains:  None    Anesthesia Type:   Monitored Anesthesia Care     Complications:  None    Operative Indications:  Suhas Ureña  is a very pleasant 64 y o  female who presented after a fall with airway compromise due to prevertebral hematoma with extravasation on CTA  No family was reachable so emergency consent was used for angiogram and possible embolization of injured vessel  Procedure Details: The patient was brought into the operating room and moved to the OR table in supine fashion  The right femoral artery was prepped and draped in the usual sterile fashion  General anesthesia care was induced  A surgical time-out was performed  10 cc of 1% lidocaine was infiltrated along the right femoral region  A 6 Pashto shuttle sheath was then placed  Next a 5 Western Shonda chang catheter was advanced  Right vertebral artery was catheterized  Cervical AP and lateral images were obtained  The catheter was then advanced into the right thyrocervical trunk  Cervical AP and lateral images were obtained  The catheter was then withdrawn into the right subclavian artery   Cervical AP and lateral images were obtained  The right common carotid artery was then catheterized  AP lateral and oblique images of the right cervical carotid were obtained  The catheter was then withdrawn into the aortic arch and advanced into the left common carotid artery  AP lateral and oblique images of the left cervical carotid were obtained  The catheter was then advanced into the left external carotid artery  Cervical AP and lateral images were obtained  The catheter was then advanced into the left vertebral artery  Cervical AP and lateral images were obtained  The catheter was then advanced into the left thyrocervical trunk  Cervical AP and lateral images were obtained  The catheter was then advanced into the left costocervical trunk  Cervical AP and lateral images were obtained  The catheter was then withdrawn into the left subclavian artery  Cervical AP and lateral images were obtained  The catheter was then exchanged for a pig tail catheter  AP and lateral aortic archograms were preformed  The catheter was then withdrawn from the body  A limited femoral arteriogram was performed  A Mynx device was used for hemostasis  There was appropriate hemostasis  The patient was then transferred to PACU intubated in stable but critical condition  All sponge and needle counts were correct  INTERPRETATION OF ANGIOGRAPHIC FINDINGS:   1  The Right vertebral artery has antegrade flow  There is tortuosity, but no evidence of dissection  No evidence of active extravisation into the cervical hematoma  2  The right thyrocervical trunk has antegrade flow  There is small thyroidal blush, but no evidence of active extravisation into the cervical hematoma  3  The right subclavian artery has antegrade flow  There is no evidence of active extravisation into the cervical hematoma  4  The right common carotid artery has antegrade flow  There is no evidence of carotid stenosis   There is no evidence of active extravisation into the cervical hematoma  5  The left common carotid artery has antegrade flow  There is no evidence of carotid stenosis  There is no evidence of active extravisation into the cervical hematoma  6  The left external carotid artery has antegrade flow  There is no evidence of active extravisation into the cervical hematoma  7  The left vertebral artery has antegrade flow  There is no evidence of active extravisation into the cervical hematoma  8  The left thyrocervical trunkhas antegrade flow  There is robust thyroidal blush, however, the contrast rapidly washes out  There is no evidence of stasis or active extravisation into the cervical hematoma  9  The left costocervical trunk has antegrade flow  There is no evidence of active extravisation into the cervical hematoma  10  The left subclavian artery has antegrade flow  There is no evidence of active extravisation into the cervical hematoma  11  The aortic archogram visualizes all major vessels off the arch without evidence of cervical extravasation  Impression:  No active hemorrhage identified from all imaged cervical vessels       Patient Disposition:  PACU     SIGNATURE: Keturah Albarran MD  DATE: 02/26/22   TIME: 10:25 PM

## 2022-02-27 NOTE — RESPIRATORY THERAPY NOTE
RT Ventilator Management Note  Mae Bhatt 64 y o  female MRN: [id number]  Unit/Bed#: [facility] Encounter: [id number]       02/26/22 2216   Respiratory Assessment   Assessment Type Assess only   General Appearance Sedated;Unresponsive   Respiratory Pattern Assisted   Chest Assessment Chest expansion symmetrical   Bilateral Breath Sounds Clear   R Breath Sounds Clear   L Breath Sounds Clear   Resp Comments Transported pt from IR to PACU bay 5  Reattached to  vent with same vent settings as used in the ED and as written  O2 Device  vent   Vent Information   Vent ID [id number]   Vent type     Vent Mode AC/VC   $ Pulse Oximetry Spot Check Charge Completed   SpO2 100 %   AC/VC Settings   Resp Rate (BPM) 14 BPM   Vt (mL) 400 mL   FIO2 (%) 100 %   PEEP (cmH2O) 6 cmH2O   Flow Pattern (LPM) 45 L/min   Trigger Sensitivity Flow (lpm) 3 %   Humidification Heater   Heater Temperature (Set) 97 2 °F (36 2 °C)   AC/VC Actuals   Resp Rate (BPM) 14 BPM   VT (mL) 444   MV 6 28   MAP (cmH2O) 9 7 cmH2O   Peak Pressure (cmH2O) 24 cmH2O   I/E Ratio (Obs) 1:3 5   Heater Temperature (Obs) 97 5 °F (36 4 °C)   AC/VC Alarms   High Peak Pressure (cmH2O) 50   High Resp Rate (BPM) 40 BPM   High MV (L/min) 20 L/min   Low MV (L/min) 3 L/min   Vt High (mL) 1000 mL  (1000)   Vt Low (mL) 225 mL   AC/VC Apnea Settings   Resp Rate (BPM) 14 BPM   VT (mL) 450 mL   FIO2 (%) 100 %   Apnea Time (s) 20 S   Apnea Flow (L/min) 456 L/min   Maintenance   Alarm (pink) cable attached No   Resuscitation bag with peep valve at bedside Yes   Water bag changed No   Circuit changed No   ETT  Cuffed 6 mm   No placement date or time found  Preoxygenated: Yes  Technique: Direct laryngoscopy  Type: Cuffed  Tube Size: 6 mm  Location: Oral  Insertion attempts: 3  Placement Verification: End tidal CO2;Fiberoptic visualization  Secured at (cm): 25  Comments:       Secured at (cm) 25   Measured from Teeth   Secured Location Right   Secured by Commercial tube [place]   Site Condition Cool       Daily Screen    No data found in the last 10 encounters  Physical Exam:   Assessment Type: Assess only  General Appearance: Sedated,Unresponsive  Respiratory Pattern: Assisted  Chest Assessment: Chest expansion symmetrical  Bilateral Breath Sounds: Clear  R Breath Sounds: Clear  L Breath Sounds: Clear  O2 Device:  vent      Resp Comments: Transported pt from IR to PACU bay 5  Reattached to  vent with same vent settings as used in the ED and as written

## 2022-02-27 NOTE — RESPIRATORY THERAPY NOTE
RT Ventilator Management Note  Grove Dus 64 y o  female MRN: [id number]  Unit/Bed#: ICU 09 Encounter: [id number]      Daily Screen       2/27/2022 0726 2/27/2022 0730          Patient safety screen outcome[de-identified] Failed Failed      Not Ready for Weaning due to[de-identified] Underline problem not resolved Underline problem not resolved              Physical Exam:   Respiratory Pattern: Assisted  Chest Assessment: Chest expansion symmetrical  Bilateral Breath Sounds: Clear      Resp Comments: (P)

## 2022-02-27 NOTE — ANESTHESIA POSTPROCEDURE EVALUATION
Post-Op Assessment Note    CV Status:  Stable  Pain scale: unable to specify  Post-procedure mental status: sedated  Hydration Status:  Stable   PONV Controlled:  None   Airway Patency:  Patent  Airway: intubated      Post Op Vitals Reviewed: Yes      Staff: CRNA         No complications documented      BP   138/78   Temp   97   Pulse  75   Resp   11   SpO2   100%

## 2022-02-27 NOTE — H&P
H&P Exam - Trauma   Argelia Schulz 64 y o  female MRN: [id number]  Unit/Bed#: ED 22 Encounter: [id number]    Trauma Assessment and Plan:    64year old female status post fall with cervical extension injury resulting in left nasal bone fracture, C5 and C6 laminal and spinous process fractures, anterior longitudinal ligament tear along cervical spine, displaced ossific focus anterior to the C6-C7 disc space suggesting avulsed displaced anterior osteophyte, and prevertebral hematoma within the cervical spine tracking inferiorly into the upper mediastinum resulting in airway compromise  Patient intubated and sent to IR for potential embolization of active bleeding around cervical spine       Assessment/Plan   Trauma Alert: Other Transfer evaluation   Model of Arrival: Ambulance  Trauma Team: Attending Lionel Dodson , Residents Jeff Duffy, Fellow [address] [place] [address] and  [employer]   Consultants: Neurosurgery: Neurointerventional  - STAT consult; returned call/text yes immediately ;      Trauma Active Problems:   Cervical spine fractures, expanding prevertebral hematoma into the upper mediastinum with airway compromise  Nasal bone fracture     Trauma Plan:   Intubate patient due to audible stridor on exam   Trauma labs  Sedation with Propofol and fentanyl   STAT CTA of the neck   C Collar   CXR   NeuroIR consult     Chief Complaint: Neck pain     History of Present Illness   HPI:  Argelia Schulz is a 64 y o  female who presents with anterior neck swelling and audible stridor after a fall at a store while looking at washing machines  She reports she was walking and tripped, causing her to fall and hit her head on a washing machine  She does not remember if she lost consciousness  She was unable to provide a thorough history due to her being short of breath  Mechanism:Fall    Review of Systems   Constitutional: Negative for activity change, appetite change and chills  HENT: Negative for congestion and drooling  Eyes: Negative  Respiratory: Positive for cough, chest tightness, shortness of breath and stridor  Negative for apnea and choking  Cardiovascular: Positive for chest pain  Negative for palpitations and leg swelling  Gastrointestinal: Negative for abdominal distention, abdominal pain, constipation and nausea  Musculoskeletal: Negative for joint swelling  Skin: Negative for color change, pallor and rash  Neurological: Positive for headaches  Negative for dizziness, seizures, facial asymmetry, light-headedness and numbness  Psychiatric/Behavioral: Negative for agitation, behavioral problems and confusion  Historical Information       Past Medical History:   Past Medical History:   Diagnosis Date    Hypertension    , Past Surgical History:   Past Surgical History:   Procedure Laterality Date    NO PAST SURGERIES      ID COLONOSCOPY FLX DX W/COLLJ SPEC WHEN PFRMD N/A 2/2/2017    Procedure: COLONOSCOPY;  Surgeon: Mayra Perez MD;  Location: [facility]; Service: Gastroenterology       Meds/Allergies   (Not in a hospital admission)      No Known Allergies    PHYSICAL EXAM    Objective   Vitals:   First set: Pulse: 104 (02/26/22 1744)  Respirations: 16 (02/26/22 1744)  Blood Pressure: (!) 229/117 (02/26/22 1744)    Primary Survey:   (A) Airway: Intact with stridor and muffled voice   (B) Breathing: Spontaneous  (C) Circulation: Pulses:   pedal  2/4 and radial  2/4  (D) Disabliity:  GCS Total:  15  (E) Expose:  Not completed     Secondary Survey: (Click on Physical Exam tab above)  Physical Exam  Constitutional:       General: She is in acute distress  Appearance: Normal appearance  She is not ill-appearing or toxic-appearing  HENT:      Head: Normocephalic  Comments: Forehead hematoma superior to nasal bridge      Right Ear: Ear canal normal       Left Ear: Ear canal normal    Eyes:      Extraocular Movements: Extraocular movements intact  Pupils: Pupils are equal, round, and reactive to light  Cardiovascular:      Rate and Rhythm: Tachycardia present  Pulses: Normal pulses  Heart sounds: Normal heart sounds  Pulmonary:      Effort: Respiratory distress present  Breath sounds: Stridor present  Abdominal:      General: There is no distension  Palpations: Abdomen is soft  Tenderness: There is no abdominal tenderness  There is no guarding  Musculoskeletal:      Cervical back: Tenderness present  Skin:     General: Skin is warm  Capillary Refill: Capillary refill takes less than 2 seconds  Neurological:      General: No focal deficit present  Mental Status: She is alert and oriented to person, place, and time  Psychiatric:         Mood and Affect: Mood normal          Behavior: Behavior normal          Invasive Devices  Report    Peripheral Intravenous Line            Peripheral IV Right Forearm -- days    Peripheral IV 02/26/22 Proximal;Right;Ventral (anterior) Forearm <1 day          Airway            ETT  Cuffed 6 mm -- days                Lab Results:   BMP/CMP:   Lab Results   Component Value Date    SODIUM 138 02/26/2022    K 3 4 (L) 02/26/2022     02/26/2022    CO2 27 02/26/2022    BUN 31 (H) 02/26/2022    CREATININE 1 00 02/26/2022    CALCIUM 8 6 02/26/2022    AST 32 02/26/2022    ALT 63 02/26/2022    ALKPHOS 98 02/26/2022    EGFR 54 02/26/2022    and CBC:   Lab Results   Component Value Date    WBC 6 67 02/26/2022    HGB 9 2 (L) 02/26/2022    HCT 29 5 (L) 02/26/2022     (H) 02/26/2022     02/26/2022    MCH 32 5 02/26/2022    MCHC 31 2 (L) 02/26/2022    RDW 13 3 02/26/2022    MPV 11 9 02/26/2022    NRBC 0 02/26/2022     Imaging/EKG Studies: positive for acute findings: Prevertebral  hematoma in the cervical neck with upper mediastinum extension  Cervical spine fractures with anterior longitudinal ligament rupture in cervical area    CTA of neck:    There is an enlarging, large prevertebral hematoma within the cervical spine tracking inferiorly into the upper mediastinum  This is displacing the trachea anteriorly and resulting in moderate tracheal narrowing at the level of the tip of the   endotracheal tube   Cecily Flax is brisk active arterial extravasation of contrast noted within the prevertebral hematoma immediately anterior to the C6-7 disc space, corresponding to a site of active bleeding  There is now widening of the disc space of C6-7 anteriorly, not present on the initial CT scan performed approximately 4 hours ago consistent with anterior longitudinal ligament disruption  Small avulsion fracture suspected  Stable posterior element   fractures involving C5 and C6  No subluxation  Other Studies:     Code Status: No Order  Advance Directive and Living Will:      Power of :    POLST:      Counseling / Coordination of Care  Total floor / unit time spent today 60 minutes  This involved direct patient contact where I performed a full history and physical, reviewed previous records, and reviewed laboratory and other diagnostic studies  Total Critical Care time spent 60 minutes excluding procedures, teaching and family updates

## 2022-02-27 NOTE — CONSULTS
Neurosurgery Consult Note  Hira Rodriguez 64 y o  female MRN: [id number]    Assessment:   Patent is a 67-year-old female with h/o HTN who tripped and fell while shopping at [address], hitting her head on a washing machine, no report of LOC, who p/w neck pain then acute respiratory decline requiring emergent intubation around 6PM on 2/26  CTH negative for intracranial hemorrhage, no skull fracture; left nasal bone fractures (mildly displaced), large scalp hematoma anterior to frontal bone extending into L>R maxillary regions  CT c-spine around 3PM showed non-displaced fractures in bilateral lamina and spinous processes of C5 and C6 with small avulsed anterior osteophyte at C6/7, C4-6 vertebral bodies are auto-fused  CTA around 7PM showed actively enlarging large prevertebral (anterior to cervical spine) hematoma extending into upper mediastinum displacing trachea, brisk active arterial extravasation of contrast in prevertebral hematoma anterior to C6/7 level, and new development of widened disc space at C6/7 that was not present on the initial CT, likely c/w ALL disruption, unchanged minimal posterior element fractures at C5/6  Taken to angiogram by Dr Leron Kehr, which demonstrated no active hemorrhage identified from all imaged cervical vessels      Plan:  -Neurologic exam intact (while intubated and sedation held briefly)  -Maintain c-spine precautions and rigid c-collar  -MRI c-spine when able/hemodynamically stable, will need to assess ALL and PLL   -Further investigation of bleeding source and treatment of anemia per primary team  -Any surgical intervention, from both anterior and posterior approaches, will be challenging given recent expanding prevertebral hematoma; we will continue to monitor neurologic exams and conservative management for now especially given that she is neurologically intact and denying neck pain  -Trauma primary       HPI  See above    History:  Past Medical History: Diagnosis Date    Hypertension      Past Surgical History:   Procedure Laterality Date    NO PAST SURGERIES      UT COLONOSCOPY FLX DX W/COLLJ SPEC WHEN PFRMD N/A 2/2/2017    Procedure: COLONOSCOPY;  Surgeon: Roxana Moss MD;  Location: [facility];   Service: Gastroenterology       Current medications:    Current Facility-Administered Medications:     chlorhexidine (PERIDEX) 0 12 % oral rinse 15 mL, 15 mL, Mouth/Throat, Q12H [address], Betty Frey MD, 15 mL at 02/27/22 0026    dexamethasone (DECADRON) injection 8 mg, 8 mg, Intravenous, Q8H [address], Aviva Leach DO, 8 mg at 02/27/22 0000    dexamethasone (DECADRON) injection 8 mg, 8 mg, Intravenous, Once PRN, [place] CRNA    diphenhydrAMINE (BENADRYL) injection 12 5 mg, 12 5 mg, Intravenous, Once PRN, [place] CRNA    fentaNYL (SUBLIMAZE) injection 25 mcg, 25 mcg, Intravenous, Q3 min PRN, [place] CRNA    fentaNYL 1000 mcg in sodium chloride 0 9% 100mL infusion, 100 mcg/hr, Intravenous, Continuous, Fer Saldaña MD, Last Rate: 10 mL/hr at 02/27/22 0350, 100 mcg/hr at 02/27/22 0350    fentanyl citrate (PF) 100 MCG/2ML 50 mcg, 50 mcg, Intravenous, Q1H PRN, Swati Killer, 50 mcg at 02/26/22 1900    Labetalol HCl (NORMODYNE) injection 10 mg, 10 mg, Intravenous, Q4H PRN, Betty Frey MD, 10 mg at 02/27/22 0049    lactated ringers infusion, 125 mL/hr, Intravenous, Continuous, Betty Frey MD, Last Rate: 125 mL/hr at 02/27/22 0027, 125 mL/hr at 02/27/22 0027    metoclopramide (REGLAN) injection 10 mg, 10 mg, Intravenous, Once PRN, [place] CRNA    propofol (DIPRIVAN) 1000 mg in 100 mL infusion (premix), 5-50 mcg/kg/min, Intravenous, Titrated, Fer Saldaña MD, Last Rate: 13 1 mL/hr at 02/27/22 0508, 30 mcg/kg/min at 02/27/22 0508    sodium chloride 0 9 % infusion, 125 mL/hr, Intravenous, Continuous, Chris Head MD    Allergies:  No Known Allergies     Lab Results   Component Value Date    WBC 9 64 02/27/2022    HGB 6 8 (LL) 02/27/2022    HCT 21 2 (L) 02/27/2022     (H) 02/27/2022     (L) 02/27/2022     Lab Results   Component Value Date    SODIUM 137 02/27/2022    K 3 4 (L) 02/27/2022     (H) 02/27/2022    CO2 24 02/27/2022    BUN 29 (H) 02/27/2022    CREATININE 1 04 02/27/2022    GLUC 163 (H) 02/27/2022    CALCIUM 8 8 02/27/2022       Review of systems:  General ROS: negative for chills, fatigue, fever, night sweats, or unintentional weight changes  Respiratory ROS: no cough, shortness of breath, or wheezing  Cardiovascular ROS: no chest pain or dyspnea on exertion  Gastrointestinal ROS: no abdominal pain, change in bowel habits, or black or bloody stools  Genito-Urinary ROS: no dysuria, trouble voiding, or hematuria  Musculoskeletal ROS: negative  Neurological ROS: negative except for symptoms outlined in HPI      Neurologic exam:  Intubated, sedation held briefly  Maintains eyes open  PERRL  Nods appropriately to name, place, and year (shakes head "no" when asked if having any neck pain or chest pain)  Follows commands briskly in all extremities  5/5 in all extremities   Sensation intact throughout   Rigid collar in place   Groin site CDI  2+ bilateral DP pulses     Lab Results: All relevant preoperative labs reviewed  Imaging: All relevant preoperative imaging reviewed  EKG, Pathology, and Other Studies:  All relevant preoperative studies reviewed    Tayo Gonzalez MD

## 2022-02-27 NOTE — H&P
H&P Exam - 1 [facility],[address] 64 y o  female MRN: [id number]  Unit/Bed#: ED 22 Encounter: [id number]      -------------------------------------------------------------------------------------------------------------  Chief Complaint: Neck pain, respiratory distress    History of Present Illness   HX and PE limited by: intubation  Suhas Ureña is a 64 y o  female with h/o hypertension who presents after she fell  She tripped in appliance store and she tripped on a mat and fell forward striking her head against a washing mashine and hyper extended her neck  No LOC  She complained neck pain, No other injury  She presented to [facility] ED by driving herself  CT scan revealed "Nondisplaced fractures in the lamina bilaterally and spinous process at C5 and C6  Displaced ossific focus anterior to the C6-7 disc space suggesting avulsed displaced anterior osteophyte  Large soft tissue abnormality anterior to the C6 and C7 likely represent prevertebral hematoma " She was transferred to [address] [place] [address] for further management but she started having respiratory distress  She was intubated in ED [facility] for airway protection  CTA showed active arterial extravasation within hematoma anterior to the C6-7 disc space narrowing the trachea    History obtained from chart review  -------------------------------------------------------------------------------------------------------------  Assessment and Plan:    Neuro:    Diagnosis: Sedation   Currently on [email or web address], [email or web address]  o Plan: Continue sedation    CV:    Diagnosis: h/o hypertension  o Plan: Will hold home meds   Will restart beta-blocker if she's hypertensive      Pulm:   Diagnosis: Acute respiratory failure due to cervical prevertebral hematoma  o Intubated at ED 2/26  o Currently on ACVC 14/400/100%/PEEP6  o Plan: Wean FiO2 if able  o Continue ventilator management        GI:    Diagnosis: No active issues  o Plan: will start pepcid anticipating intubation >48hrs      :    Diagnosis: No active problems  o Plan: Strict I/O      F/E/N:    Plan: [email or web address], replete electrolytes if needed, NPO      Heme/Onc:    Diagnosis: No active problems  o Plan: hold chemical DVT prophylaxis due to active bleeding        Endo:   o Diagnosis: no active problems      ID:    Diagnosis: No active problems  o Plan: Monitor fever curve    MSK/Skin:    Diagnosis: Nondisplaced fractures in the lamina bilaterally and spinous process at C5 and C6  Displaced ossific focus anterior to the C6-7 disc space suggesting avulsed displaced anterior osteophyte  Large soft tissue abnormality anterior to the C6 and C7 represent prevertebral hematoma with active extravasation  o Plan: Neurosurgery contacted immediately  The plan was to perform angioembolization for active bleeding  o Continue neck collar  o Appreciate recommendation per neurosurgery  Diagnosis: Left nasal bone depressed comminuted fractures                    consider OMFS consult    Disposition: Admit to Critical Care   Code Status: Level 1 - Full Code  --------------------------------------------------------------------------------------------------------------  Review of Systems   Unable to perform ROS: Intubated       Review of systems was unable to be performed secondary to Intubation    Physical Exam  Vitals and nursing note reviewed  Constitutional:       Comments: Intubated   HENT:      Head:      Comments: abrasion     Nose: Nose normal       Mouth/Throat:      Mouth: Mucous membranes are moist       Pharynx: Oropharynx is clear  Eyes:      Extraocular Movements: Extraocular movements intact  Conjunctiva/sclera: Conjunctivae normal       Pupils: Pupils are equal, round, and reactive to light  Cardiovascular:      Rate and Rhythm: Normal rate and regular rhythm  Pulses: Normal pulses  Heart sounds: Normal heart sounds     Pulmonary:      Effort: Pulmonary effort is normal       Breath sounds: Normal breath sounds  Abdominal:      General: Abdomen is flat  Bowel sounds are normal       Palpations: Abdomen is soft  Musculoskeletal:         General: Normal range of motion  Cervical back: Neck supple  Skin:     General: Skin is warm and dry  Capillary Refill: Capillary refill takes less than 2 seconds  Neurological:      Comments: Intubated   Psychiatric:      Comments: Intubated       --------------------------------------------------------------------------------------------------------------  Vitals:   Vitals:    02/26/22 1906 02/26/22 1915 02/26/22 1930 02/26/22 1945   BP:  151/86 168/90 (!) 179/93   BP Location:  Left arm     Pulse: 94 91 90 90   Resp: 14 14 14 14   SpO2: 100% 100% 100% 100%     Temp  Min: 96 5 °F (35 8 °C)  Max: 96 9 °F (36 1 °C)        There is no height or weight on file to calculate BMI  N/A    Laboratory and Diagnostics:  Results from last 7 days   Lab Units 02/26/22  1531   WBC Thousand/uL 6 67   HEMOGLOBIN g/dL 9 2*   HEMATOCRIT % 29 5*   PLATELETS Thousands/uL 173   NEUTROS PCT % 65   MONOS PCT % 6     Results from last 7 days   Lab Units 02/26/22  1531   SODIUM mmol/L 138   POTASSIUM mmol/L 3 4*   CHLORIDE mmol/L 106   CO2 mmol/L 27   ANION GAP mmol/L 5   BUN mg/dL 31*   CREATININE mg/dL 1 00   CALCIUM mg/dL 8 6   GLUCOSE RANDOM mg/dL 104   ALT U/L 63   AST U/L 32   ALK PHOS U/L 98   ALBUMIN g/dL 2 2*   TOTAL BILIRUBIN mg/dL 0 43          Results from last 7 days   Lab Units 02/26/22  1545   INR  1 12   PTT seconds 28              ABG:    VBG:          Micro:        EKG: NSR  Imaging: I have personally reviewed pertinent reports  Historical Information   Past Medical History:   Diagnosis Date    Hypertension      Past Surgical History:   Procedure Laterality Date    NO PAST SURGERIES      OR COLONOSCOPY FLX DX W/COLLJ SPEC WHEN PFRMD N/A 2/2/2017    Procedure: COLONOSCOPY;  Surgeon: Wali Hollis MD;  [facility];   Service: Gastroenterology Social History   Social History     Substance and Sexual Activity   Alcohol Use No     Social History     Substance and Sexual Activity   Drug Use No     Social History     Tobacco Use   Smoking Status Former Smoker   Smokeless Tobacco Never Used     Family History:   Family History   Family history unknown: Yes     I have reviewed this patient's family history and commented on sigificant items within the HPI      Medications:  Current Facility-Administered Medications   Medication Dose Route Frequency    chlorhexidine (PERIDEX) 0 12 % oral rinse 15 mL  15 mL Mouth/Throat Q12H [address]    dexamethasone (DECADRON) injection 8 mg  8 mg Intravenous Q8H [address]    fentaNYL 1000 mcg in sodium chloride 0 9% 100mL infusion  100 mcg/hr Intravenous Continuous    fentanyl citrate (PF) 100 MCG/2ML 50 mcg  50 mcg Intravenous Q1H PRN    lactated ringers infusion  125 mL/hr Intravenous Continuous    propofol (DIPRIVAN) 1000 mg in 100 mL infusion (premix)  5-50 mcg/kg/min Intravenous Titrated     Home medications:  Prior to Admission Medications   Prescriptions Last Dose Informant Patient Reported?  Taking?   hydrochlorothiazide (HYDRODIURIL) 25 mg tablet   No No   Sig: Take 1 tablet (25 mg total) by mouth daily   losartan (COZAAR) 25 mg tablet   No No   Sig: Take 2 tablets (50 mg total) by mouth daily   metoprolol succinate (TOPROL-XL) 50 mg 24 hr tablet   No No   Sig: Take 1 tablet (50 mg total) by mouth daily      Facility-Administered Medications: None     Allergies:  No Known Allergies  ------------------------------------------------------------------------------------------------------------  Advance Directive and Living Will:      Power of :    POLST:    ------------------------------------------------------------------------------------------------------------  Anticipated Length of Stay is > 2 midnights    Care Time Delivered:     Upon my evaluation, this patient had a high probability of imminent or life-threatening deterioration due to respiratory failure, which required my direct attention, intervention, and personal management  I have personally provided 30 minutes (20:00 to 20:30) of critical care time, exclusive of procedures, teaching, family meetings, and any prior time recorded by providers other than myself  Mello Leyva MD        Portions of the record may have been created with voice recognition software  Occasional wrong word or "sound a like" substitutions may have occurred due to the inherent limitations of voice recognition software    Read the chart carefully and recognize, using context, where substitutions have occurred

## 2022-02-27 NOTE — CASE MANAGEMENT
Case Management Assessment & Discharge Planning Note    Patient name Perfecto Dus  Location ICU 09/ICU 09 MRN [id number]  : 1945 Date 2022       Current Admission Date: 2022  Current Admission Diagnosis:Cervical spine fracture [facility])   Patient Active Problem List    Diagnosis Date Noted    Cervical spine fracture ([address] ) 2022    Hematoma of neck 2022    BMI 29 0-29 9,adult 2020    Medicare annual wellness visit, initial 2019    Essential hypertension 2016      LOS (days): 1  Geometric Mean LOS (GMLOS) (days):   Days to GMLOS:     OBJECTIVE:    Risk of Unplanned Readmission Score: 13         Current admission status: Inpatient       Preferred Pharmacy:   [facility]/pharmacy #[id number]- [place] - [facility] [address] [address]  Phone: [phone] Fax: [phone]    Primary Care Provider: Eva Bell MD    Primary Insurance: [address] [facility] [address] [facility] [address]  Secondary Insurance:     ASSESSMENT:  [address] [place] [address] Representative - Son   Primary Phone: [phone] (Mobile)               Advance Directives  Does patient have a [address]?: Yes  Does patient have Advance Directives?: Yes  Advance Directives: Living will,Power of  for health care  Primary Contact: Lainey Barger      Patient Information  Admitted from[de-identified] Home  Mental Status: Alert  During Assessment patient was accompanied by: Not accompanied during assessment  Assessment information provided by[de-identified] Son  Primary Caregiver: Self  Support Systems: Self,Family members  Home entry access options   Select all that apply : Stairs  Number of steps to enter home : 3  Do the steps have railings?: Yes  Type of Current Residence: 2 story home  Upon entering residence, is there a bedroom on the main floor (no further steps)?: No  A bedroom is located on the following floor levels of residence (select all that apply):: 2nd Floor  Upon entering residence, is there a bathroom on the main floor (no further steps)?: Yes  Number of steps to 2nd floor from main floor: One Flight  In the last 12 months, was there a time when you were not able to pay the mortgage or rent on time?: No  In the last 12 months, how many places have you lived?: 1  In the last 12 months, was there a time when you did not have a steady place to sleep or slept in a shelter (including now)?: No  Homeless/housing insecurity resource given?: N/A  Living Arrangements: Lives Alone  Is patient a ?: No    Activities of Daily Living Prior to Admission  Functional Status: Independent  Completes ADLs independently?: Yes  Ambulates independently?: Yes  Does patient use assisted devices?: No  Does patient currently own DME?: No  Does patient have a history of Outpatient Therapy (PT/OT)?: No  Does the patient have a history of Short-Term Rehab?: No  Does patient have a history of HHC?: No  Does patient currently have [facility]?: No         Patient Information Continued  Income Source: Employed  Does patient have prescription coverage?: Yes  Within the past 12 months, you worried that your food would run out before you got the money to buy more : Never true  Within the past 12 months, the food you bought just didnt last and you didnt have money to get more : Never true  Food insecurity resource given?: N/A  Does patient receive dialysis treatments?: No  Does patient have a history of substance abuse?: No  Does patient have a history of Mental Health Diagnosis?: No    PHQ 2/9 Screening   Reviewed PHQ 2/9 Depression Screening Score?: No    Means of Transportation  Means of Transport to Appts[de-identified] Drives Self  In the past 12 months, has lack of transportation kept you from medical appointments or from getting medications?: No  In the past 12 months, has lack of transportation kept you from meetings, work, or from getting things needed for daily living?: No  Was application for public transport provided?: N/A        DISCHARGE DETAILS:    Discharge planning discussed with[de-identified] DIL  Freedom of Choice: Yes  Comments - Freedom of Choice: Discussed FOC  CM contacted family/caregiver?: Yes    Contacts  Patient Contacts: Cindy Flores  Relationship to Patient[de-identified] Family  Reason/Outcome: Continuity of [address]         Is the patient interested in [place] [address] at discharge?: No    DME Referral Provided  Referral made for DME?: No     CM reviewed d/c planning process including the following: identifying help at home, patient preference for d/c planning needs, Discharge Lounge, [facility] Meds to Bed program, availability of treatment team to discuss questions or concerns patient and/or family may have regarding understanding medications and recognizing signs and symptoms once discharged  CM also encouraged patient to follow up with all recommended appointments after discharge  Patient advised of importance for patient and family to participate in managing patients medical well being  Patient has had 2 Pfizer covid vaccine doses    Patient is retired from [employer], 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

## 2022-02-27 NOTE — UTILIZATION REVIEW
Initial Clinical Review    Admission: Date/Time/Statement:   Admission Orders (From admission, onward)     Ordered        02/26/22 1949  Inpatient Admission  Once                      Orders Placed This Encounter   Procedures    Inpatient Admission     Standing Status:   Standing     Number of Occurrences:   1     Order Specific Question:   Level of Care     Answer:   Critical Care [15]     Order Specific Question:   Estimated length of stay     Answer:   More than 2 Midnights     Order Specific Question:   Certification     Answer:   I certify that inpatient services are medically necessary for this patient for a duration of greater than two midnights  See H&P and MD Progress Notes for additional information about the patient's course of treatment  ED Arrival Information     Expected Arrival Acuity    2/26/2022 2/26/2022 17:25 Immediate         Means of arrival Escorted by Service Admission type    Ambulance [place]  Care/ICU Emergency         Arrival complaint    C5/C6 f[facility]        Chief Complaint   Patient presents with    Fall     fell forward into washing maching, fracture of c5 and c6  transferred from [facility]       Initial Presentation: 63 yo fem transferred from [address] [place] [address] ED by EMS to [facility] ED for trauma evaluation, admitted as inpatient to ICU due to C spine fx with expanding prevertebral hematoma into upper mediastinum w/airway compromise  Presented initially to [facility] after tripped and fell while out shopping, hit head on washing machine, sustaining C5/C6 fx, nasal bone fx, and C6-C7 disc space displacement with prevertebral hematoma which is compromising her airway  Decision made to transfer to the level 1 trauma center for neurosurgery evaluation and higher level of care   Upon arrival to [facility], [address] with stridor and muffled voice, GCS 15, was in acute distress, forehead hematoma superior to nasal bridge, tachycardic, cervical tenderness  Emergently intubated, sent to IR for possible embolization of bleeding around the c spine  Neurosurgery stat consulted  Per neurosurgery: Imaging significant for C5-6 fracture with anterior widening concerning for anterior ligament injury along with active extravasation  While in the ED, c/o increasing neck pain, insp wheezing, chest pain, and then anterior neck swelling with stridor  Has nondisplaced fx bilateral lamina and spinous processes at C5-6 as well as displaced ossific focus anterior to the C6-7 disc space suggesting a balled displaced anterior osteophyte with worsening anterior widening at C6-7 concerning for anterior ligament injury  Has active arterial extravasation with hematoma immediately anterior to C6-7 disc space  Will need MRI c spine once bleeding is stabilized  Aspen collar at all times, no mobilization  2/26 IR report:  Procedure:  Right Common Carotid Arteriogram  Right Vertebral Artery Arteriogram  Right Subclavian Artery Arteriogram  Right Thyrocervical and costocervical ateriograms  Left Common Carotid Arteriogram  Left External Carotid Arteriogram  Left Vertebral Artery Arteriogram  Left Thyrocervical and costocervical ateriograms  Aorta archogram  Limited femoral angiorgram    Anesthesia Type: Monitored Anesthesia Care   Impression:No active hemorrhage identified from all imaged cervical vessels  Date: 2/27   Day 2: remains sedated, intubated, vent settings ACVC 14/400/50%/PEEP6; IVF In progress, has urine output, hgb dropped to 6 8, transfusing 1u prbc's, large anterior neck hematoma visible, remains in aspen collar, when able, consult OMFS for depressed comminuted nasal fx  Neurosurgery following: bleeding source must be further investigated; surgical intervention from both anterior and posterior approaches will be challenging due to expanding hematoma   Keep in aspen collar    Per OMFS: no surgical intervention needed for nasal fx       Temperature Pulse Respirations Blood Pressure SpO2   02/26/22 2214 02/26/22 1744 02/26/22 1744 02/26/22 1744 02/26/22 1744   (!) 97 °F (36 1 °C) 104 16 (!) 229/117 97 %      Temp Source Heart Rate Source Patient Position - Orthostatic VS BP Location FiO2 (%)   02/27/22 0007 02/26/22 1915 02/26/22 1915 02/26/22 1915 02/26/22 2214   Bladder Monitor Lying Left arm 100      Pain Score       02/26/22 1744       9          Wt Readings from Last 1 Encounters:   02/27/22 73 kg (160 lb 15 oz)     Additional Vital Signs:   02/27/22 2300 -- 56 13 -- -- -- -- 100 % 50 -- -- -- -- --   02/27/22 2200 -- 56 7 Abnormal  -- -- -- -- 100 % 50 -- -- -- -- --   02/27/22 2100 -- 60 13 -- -- -- -- 100 % 50 -- -- -- -- --   02/27/22 2000 98 3 °F (36 8 °C) 60 9 Abnormal  -- -- -- -- 100 % 50 -- -- Ventilator -- --   02/27/22 1920 -- -- -- -- -- -- -- 100 % -- -- -- -- -- --   02/27/22 1900 -- 62 4 Abnormal  -- -- -- -- 99 % 50 -- -- -- -- --   02/27/22 1800 -- 64 4 Abnormal  -- -- -- -- 99 % -- -- -- -- -- --   02/27/22 1700 -- 64 13 116/59 78 -- -- 100 % -- -- -- -- -- --   02/27/22 1500 99 3 °F (37 4 °C) 60 13 93/46 Abnormal  64 94/34 52 mmHg 100 % -- -- -- -- -- --   02/27/22 1400 99 7 °F (37 6 °C) 60 13 -- -- 108/36 58 mmHg 100 % -- -- -- -- -- --   02/27/22 1300 99 7 °F (37 6 °C) 64 14 105/51 68 106/38 60 mmHg 100 % -- -- --          Date/Time Temp Pulse Resp BP MAP (mmHg) Arterial Line BP MAP SpO2 FiO2 (%) Calculated FIO2 (%) - Nasal Cannula Nasal Cannula O2 Flow Rate (L/min) O2 Device Cardiac (WDL) Patient Position - Orthostatic VS   02/27/22 1200 99 7 °F (37 6 °C) 66 13 -- -- 118/42 66 mmHg 100 % -- -- -- -- -- --   02/27/22 1100 99 3 °F (37 4 °C) 68 13 104/52 66 102/38 56 mmHg 100 % -- -- -- -- -- --   02/27/22 1000 99 3 °F (37 4 °C) 68 13 -- -- 106/38 58 mmHg 100 % -- -- -- -- -- --   02/27/22 0945 99 3 °F (37 4 °C) 66 14 -- -- 110/40 60 mmHg 100 % -- -- -- -- -- --   02/27/22 0930 99 3 °F (37 4 °C) 68 14 -- -- 114/40 62 mmHg 100 % -- -- -- -- -- --   02/27/22 0900 99 7 °F (37 6 °C) 70 14 112/53 71 108/40 60 mmHg 100 % -- -- -- -- -- --   02/27/22 0830 99 7 °F (37 6 °C) 68 13 -- -- 100/36 54 mmHg 100 % -- -- -- -- -- --   02/27/22 0815 99 7 °F (37 6 °C) 68 14 103/54 -- 106/36 56 mmHg 100 % -- -- -- -- -- --   02/27/22 0800 99 7 °F (37 6 °C) 70 13 -- -- 112/44 64 mmHg 100 % -- -- -- -- -- --   02/27/22 0745 99 3 °F (37 4 °C) 70 11 Abnormal  -- -- 126/48 72 mmHg 100 % -- -- -- -- -- --   02/27/22 0734 99 5 °F (37 5 °C) 72 18 133/53 -- 136/52 78 mmHg 100 % -- -- -- -- -- --   02/27/22 0730 99 3 °F (37 4 °C) 74 15 -- -- 144/56 82 mmHg 100 % -- -- -- -- -- --   02/27/22 0700 99 3 °F (37 4 °C) 72 7 Abnormal  139/67 88 152/60 90 mmHg 100 % -- -- -- -- -- --   02/27/22 0630 99 °F (37 2 °C) 78 12 -- -- 166/68 104 mmHg 100 % -- -- -- -- -- --   02/27/22 0600 99 °F (37 2 °C) 72 10 Abnormal  -- -- 112/46 66 mmHg 100 % -- -- -- -- -- --   02/27/22 0530 99 °F (37 2 °C) 72 7 Abnormal  -- -- 124/52 76 mmHg 100 % -- -- -- -- -- --   02/27/22 0500 98 6 °F (37 °C) 74 14 120/59 76 136/58 86 mmHg 100 % -- -- -- Ventilator -- --   02/27/22 0430 98 6 °F (37 °C) 74 1 Abnormal  -- -- 132/56 80 mmHg 100 % -- -- -- -- -- --   02/27/22 0400 98 6 °F (37 °C) 76 14 -- -- 154/66 96 mmHg 100 % -- -- -- Ventilator -- --   02/27/22 0330 98 6 °F (37 °C) 78 14 -- -- 152/64 92 mmHg 100 % -- -- -- -- -- --   02/27/22 0302 -- -- -- -- -- -- -- 100 % -- -- -- -- -- --   02/27/22 0300 98 2 °F (36 8 °C) 72 14 141/73 93 160/66 98 mmHg 100 % -- -- -- -- -- --   02/27/22 0230 98 2 °F (36 8 °C) 70 14 -- -- 170/70 106 mmHg 100 % -- -- -- -- -- --   02/27/22 0215 97 9 °F (36 6 °C) 68 14 -- -- 170/70 106 mmHg 100 % -- -- -- -- -- --   02/27/22 0200 97 9 °F (36 6 °C) 64 14 -- -- 172/70 106 mmHg 100 % -- -- -- -- -- --   02/27/22 0145 97 9 °F (36 6 °C) 64 9 Abnormal  -- -- 172/70 108 mmHg 100 % -- -- -- -- -- --   02/27/22 0130 97 5 °F (36 4 °C) 62 4 Abnormal  -- -- 178/74 114 mmHg 100 % -- -- -- -- -- --   02/27/22 0115 97 5 °F (36 4 °C) 64 14 -- -- 176/70 108 mmHg 100 % -- -- -- -- -- --   02/27/22 0100 97 2 °F (36 2 °C) Abnormal  66 14 150/80 105 170/72 106 mmHg 100 % -- -- -- -- -- --   02/27/22 0045 97 2 °F (36 2 °C) Abnormal  66 13 -- -- 190/80 122 mmHg 100 % -- -- -- -- -- --   02/27/22 0030 97 2 °F (36 2 °C) Abnormal  68 13 -- -- 184/80 120 mmHg 100 % -- -- -- -- -- --   02/27/22 0015 96 8 °F (36 °C) Abnormal  70 15 -- -- 196/86 130 mmHg 100 % -- -- -- -- -- --   02/27/22 0007 96 8 °F (36 °C) Abnormal  70 14 154/81 105 180/76 114 mmHg 100 % 100 -- -- Ventilator -- Lying   02/26/22 2345 -- 77 16 -- -- -- -- 100 % -- -- -- -- -- --   02/26/22 2315 -- 76 14 -- -- 182/78 118 mmHg 100 % -- -- -- -- -- --   02/26/22 2300 97 °F (36 1 °C) Abnormal  76 14 154/84 110 176/76 112 mmHg 100 % 100  -- -- Ventilator -- --   02/26/22 2245 -- 78 14 151/82 116 180/78 116 mmHg 100 % -- -- -- -- -- --   02/26/22 2230 -- 72 14 159/88 120 184/78 118 mmHg 100 % -- -- -- -- -- --   02/26/22 2216 -- -- -- -- -- -- -- 100 % -- -- -- -- -- --   02/26/22 2214 97 °F (36 1 °C) Abnormal  75 14 138/78 -- -- -- 100 % 100  -- -- Ventilator X --   02/26/22 2030 -- 85 14 161/83 114 -- -- 100 % -- -- -- -- -- --   02/26/22 2000 -- 89 14 176/97 Abnormal  131 -- -- 100 % -- -- -- -- -- --   02/26/22 1945 -- 90 14 179/93 Abnormal  129 -- -- 100 % -- -- -- -- -- --   02/26/22 1930 -- 90 14 168/90 121 -- -- 100 % -- -- -- -- -- --   02/26/22 1915 -- 91 14 151/86 112 -- -- 100 % -- -- -- Ventilator -- Lying   02/26/22 1906 -- 94 14 -- -- -- -- 100 % -- -- -- -- -- --   02/26/22 1905 -- -- -- 139/77 102 -- -- -- -- -- -- -- -- --   02/26/22 1825 -- -- -- 165/97 125 -- -- -- -- -- -- -- -- --   02/26/22 1824 -- 108 Abnormal  15 -- -- -- -- 100 % -- -- -- -- -- --   02/26/22 1823 -- 105 14 -- -- -- -- 100 % -- -- -- -- -- --   02/26/22 1820 -- -- -- 160/94 121 -- -- -- -- -- -- -- -- --   02/26/22 1815 -- 101 18 163/95 123 -- -- 100 % -- -- -- -- -- --   02/26/22 1812 -- 103 28 Abnormal  211/136 Abnormal  -- -- -- 100 % -- -- -- -- -- --   02/26/22 1805 -- -- -- 259/134 Abnormal  182 -- -- -- -- -- -- -- -- --   02/26/22 1800 -- 104 15 209/133 Abnormal  164 -- -- 100 % -- -- -- -- -- --   02/26/22 1759 -- 98 23 Abnormal  -- -- -- -- 100 % -- -- -- -- -- --   02/26/22 1757 -- 99 32 Abnormal  160/95 123 -- -- 100 % -- -- -- -- -- --   02/26/22 1755 -- -- -- 209/102 Abnormal  142 -- -- -- -- -- -- -- -- --   02/26/22 1754 -- 100 20 -- -- -- -- 99 % -- -- -- -- -- --   02/26/22 1751 -- 102 20 209/102 Abnormal  -- -- -- 99 % -- -- -- --         Pertinent Labs/Diagnostic Test Results:   CT recon (no charge)   Final Result by Michael Manning DO (02/26 1933)      Acute mildly displaced left nasal bone fractures  Otherwise no facial fracture identified  Hematoma anterior to the frontal bone in the midline tracks inferiorly into the preseptal soft tissues, left greater than right and into the left cheek  Workstation performed: [id number]         CTA neck w wo contrast   Final Result by Michael Manning DO (02/26 1929)      There is an enlarging, large prevertebral hematoma within the cervical spine tracking inferiorly into the upper mediastinum  This is displacing the trachea anteriorly and resulting in moderate tracheal narrowing at the level of the tip of the    endotracheal tube  There is brisk active arterial extravasation of contrast noted within the prevertebral hematoma immediately anterior to the C6-7 disc space, corresponding to a site of active bleeding  There is now widening of the disc space of C6-7 anteriorly, not present on the initial CT scan performed approximately 4 hours ago consistent with anterior longitudinal ligament disruption  Small avulsion fracture suspected  Stable posterior element    fractures involving C5 and C6  No subluxation        XR chest portable   Final Result by Tray Ayoub MD (02/27 1003)      Endotracheal tube as above  Widening of the right paratracheal stripe indicative of underlying adenopathy or mass  Right lower lobe subsegmental atelectasis  Workstation performed: [id number]         IR cerebral angiography / intervention    2/26    Procedure Details: The patient was brought into the operating room and moved to the OR table in supine fashion  The right femoral artery was prepped and draped in the usual sterile fashion  General anesthesia care was induced  A surgical time-out was performed  10 cc of 1% lidocaine was infiltrated along the right femoral region  A 6 Kiswahili shuttle sheath was then placed  Next a 5 [employer] chang catheter was advanced      Right vertebral artery was catheterized  Cervical AP and lateral images were obtained       The catheter was then advanced into the right thyrocervical trunk  Cervical AP and lateral images were obtained       The catheter was then withdrawn into the right subclavian artery  Cervical AP and lateral images were obtained       The right common carotid artery was then catheterized  AP lateral and oblique images of the right cervical carotid were obtained       The catheter was then withdrawn into the aortic arch and advanced into the left common carotid artery  AP lateral and oblique images of the left cervical carotid were obtained       The catheter was then advanced into the left external carotid artery  Cervical AP and lateral images were obtained       The catheter was then advanced into the left vertebral artery  Cervical AP and lateral images were obtained       The catheter was then advanced into the left thyrocervical trunk  Cervical AP and lateral images were obtained       The catheter was then advanced into the left costocervical trunk  Cervical AP and lateral images were obtained       The catheter was then withdrawn into the left subclavian artery   Cervical AP and lateral images were obtained      The catheter was then exchanged for a pig tail catheter  AP and lateral aortic archograms were preformed       The catheter was then withdrawn from the body  A limited femoral arteriogram was performed  A Mynx device was used for hemostasis  There was appropriate hemostasis  The patient was then transferred to PACU intubated in stable but critical condition  All sponge and needle counts were correct          INTERPRETATION OF ANGIOGRAPHIC FINDINGS:   1  The Right vertebral artery has antegrade flow  There is tortuosity, but no evidence of dissection  No evidence of active extravisation into the cervical hematoma       2  The right thyrocervical trunk has antegrade flow  There is small thyroidal blush, but no evidence of active extravisation into the cervical hematoma       3  The right subclavian artery has antegrade flow  There is no evidence of active extravisation into the cervical hematoma      4  The right common carotid artery has antegrade flow  There is no evidence of carotid stenosis  There is no evidence of active extravisation into the cervical hematoma      5  The left common carotid artery has antegrade flow  There is no evidence of carotid stenosis  There is no evidence of active extravisation into the cervical hematoma      6  The left external carotid artery has antegrade flow  There is no evidence of active extravisation into the cervical hematoma      7  The left vertebral artery has antegrade flow  There is no evidence of active extravisation into the cervical hematoma      8  The left thyrocervical trunkhas antegrade flow  There is robust thyroidal blush, however, the contrast rapidly washes out  There is no evidence of stasis or active extravisation into the cervical hematoma    9  The left costocervical trunk has antegrade flow  There is no evidence of active extravisation into the cervical hematoma    10  The left subclavian artery has antegrade flow   There is no evidence of active extravisation into the cervical hematoma    11  The aortic archogram visualizes all major vessels off the arch without evidence of cervical extravasation        Impression:  No active hemorrhage identified from all imaged cervical vessels  MRI c spine  2/27  Results posted [email or web address]   There is a heterogeneous soft tissue mass identified within the prevertebral space centered anterior to the C6-7 disc space and extending superiorly and inferiorly  This extends inferiorly below the scope of this examination and displaces the hypopharynx, trachea and upper esophagus anteriorly  This is consistent with the known hematoma seen on CT scans yesterday  Slight widening of the anterior aspect of the C6-7 disc space with rupture of the anterior longitudinal ligament  The posterior longitudinal ligament and ligamentum flavum appear intact      Patient has known posterior element fractures at C5 and C6 incompletely evaluated on this examination  There is mild interspinous ligament injury in the posterior soft tissues extending from C2 through C5      Large prevertebral space hematoma anterior to the cervical spine    No cord compression or abnormal cord signal identified               Results from last 7 days   Lab Units 02/27/22  1147 02/27/22  0450 02/26/22  1531   WBC Thousand/uL  --  9 64 6 67   HEMOGLOBIN g/dL 7 7* 6 8* 9 2*   HEMATOCRIT % 24 1* 21 2* 29 5*   PLATELETS Thousands/uL  --  140* 173   NEUTROS ABS Thousands/µL  --  8 36* 4 32         Results from last 7 days   Lab Units 02/27/22  0450 02/26/22  1531   SODIUM mmol/L 137 138   POTASSIUM mmol/L 3 4* 3 4*   CHLORIDE mmol/L 110* 106   CO2 mmol/L 24 27   ANION GAP mmol/L 3* 5   BUN mg/dL 29* 31*   CREATININE mg/dL 1 04 1 00   EGFR ml/min/1 73sq m 51 54   CALCIUM mg/dL 8 8 8 6   MAGNESIUM mg/dL 1 6  --    PHOSPHORUS mg/dL 6 1*  --      Results from last 7 days   Lab Units 02/26/22  1531   AST U/L 32   ALT U/L 63   ALK PHOS U/L 98   TOTAL PROTEIN g/dL 11 6*   ALBUMIN g/dL 2 2*   TOTAL BILIRUBIN mg/dL 0 43     Results from last 7 days   Lab Units 02/27/22  0032   POC GLUCOSE mg/dl 118     Results from last 7 days   Lab Units 02/27/22  0450 02/26/22  1531   GLUCOSE RANDOM mg/dL 163* 104     Results from last 7 days   Lab Units 02/27/22  0748   PH ART  7 405   PCO2 ART mm Hg 37 2   PO2 ART mm Hg 139 8*   HCO3 ART mmol/L 22 8   BASE EXC ART mmol/L -1 7   O2 CONTENT ART mL/dL 11 2*   O2 HGB, ARTERIAL % 98 4*   ABG SOURCE  Line, Arterial       Results from last 7 days   Lab Units 02/26/22  1545   PROTIME seconds 14 2   INR  1 12   PTT seconds 28         ED Treatment:   Medication Administration from 02/26/2022 1532 to 02/27/2022 0002       Date/Time Order Dose Route Action     02/26/2022 1758 etomidate (AMIDATE) 2 mg/mL injection 20 mg 20 mg Intravenous Given     02/26/2022 1800 Succinylcholine Chloride 100 mg/5 mL syringe 100 mg 100 mg Intravenous Given     02/26/2022 1826 dexamethasone (DECADRON) injection 8 mg 8 mg Intravenous Given     02/26/2022 2221 propofol (DIPRIVAN) 1000 mg in 100 mL infusion (premix) 40 mcg/kg/min Intravenous New Bag     02/26/2022 1811 propofol (DIPRIVAN) 1000 mg in 100 mL infusion (premix) 25 mcg/kg/min Intravenous New Bag     02/26/2022 1815 fentanyl citrate (PF) 100 MCG/2ML 50 mcg 50 mcg Intravenous Given     02/26/2022 2214 fentaNYL 1000 mcg in sodium chloride 0 9% 100mL infusion 100 mcg/hr Intravenous Continued from OR     02/26/2022 2149 fentaNYL 1000 mcg in sodium chloride 0 9% 100mL infusion 100 mcg/hr Intravenous Restarted     02/26/2022 1904 fentaNYL 1000 mcg in sodium chloride 0 9% 100mL infusion 100 mcg/hr Intravenous New Bag     02/27/2022 0000 dexamethasone (DECADRON) injection 8 mg 8 mg Intravenous Given     02/26/2022 1900 fentanyl citrate (PF) 100 MCG/2ML 50 mcg 50 mcg Intravenous Given     02/26/2022 1850 iohexol (OMNIPAQUE) 350 MG/ML injection (SINGLE-DOSE) 85 mL 85 mL Intravenous Given     02/26/2022 4656 lidocaine 1% buffered 10 mL Infiltration Given     02/26/2022 2201 iodixanol (VISIPAQUE) 320 MG/ML injection 250 mL 120 mL Intravenous Given     02/26/2022 2306 Labetalol HCl (NORMODYNE) injection 10 mg 10 mg Intravenous Given     02/26/2022 2233 Labetalol HCl (NORMODYNE) injection 10 mg 10 mg Intravenous Given        Past Medical History:   Diagnosis Date    Hypertension      Present on Admission:   Essential hypertension      Admitting Diagnosis: Cervical spine fracture ([facility] [address] ) [S12  9XXA]  Multiple abrasions [T07  XXXA]  Age/Sex: 64 y o  female  Admission Orders:  Scheduled Medications:  chlorhexidine, 15 mL, Mouth/Throat, Q12H [address]  dexamethasone, 8 mg, Intravenous, Q8H [address]  insulin lispro, 1-5 Units, Subcutaneous, Q6H [address]  RSI: IV etomidate x 1, IV fentanyl x 1, IV sux x 1 on 2/26    IV kcl x 3 2/27    Continuous IV Infusions:  fentaNYL, 100 mcg/hr, Intravenous, Continuous  lactated ringers, 125 mL/hr, Intravenous, Continuous  propofol, 5-50 mcg/kg/min, Intravenous, Titrated  sodium chloride, 125 mL/hr, Intravenous, Continuous      PRN Meds:  fentanyl citrate (PF), 50 mcg, Intravenous, Q1H PRN x 1 2/26  Labetalol HCl, 10 mg, Intravenous, Q4H PRN x 2 2/26, x 1 2/27  metoclopramide, 10 mg, Intravenous, Once PRN        IP CONSULT TO NEUROSURGERY  IP CONSULT TO SURGICAL CRITICAL CARE  IP CONSULT TO CASE MANAGEMENT  IP CONSULT TO ORAL AND MAXILLOFACIAL SURGERY    Network Utilization Review Department  ATTENTION: Please call with any questions or concerns to [phone] and carefully listen to the prompts so that you are directed to the right person  All voicemails are confidential   Paula Colon all requests for admission clinical reviews, approved or denied determinations and any other requests to dedicated fax number below belonging to the campus where the patient is receiving treatment   List of dedicated fax numbers for the Facilities:  FACILITY NAME UR FAX NUMBER   ADMISSION DENIALS (Administrative/Medical Necessity) [address] [facility] (Maternity/NICU/Pediatrics) [address] [place] [address],[address] [facility] 40 125 [facility]  [phone]   [address] [place] [address] 150 [facility] [address] [address] [place] [address] Tammy Arabella Jim[address]nez [address] [phone]   [address] [place] [address] [phone]

## 2022-02-27 NOTE — ANESTHESIA PROCEDURE NOTES
Arterial Line Insertion  Performed by: Sherrill Chamberlain MD  Authorized by: Sherrill Chamberlain MD   Consent: The procedure was performed in an emergent situation  Required items: required blood products, implants, devices, and special equipment available  Patient identity confirmed: anonymous protocol, patient vented/unresponsive  Preparation: Patient was prepped and draped in the usual sterile fashion    Indications: hemodynamic monitoring  Orientation:  Left  Location: radial artery  Sedation:  Patient sedated: no    Procedure Details:  Augusto's test normal: yes  Needle gauge: 20  Seldinger technique: Seldinger technique used  Number of attempts: 2    Post-procedure:  Post-procedure: dressing applied  Waveform: good waveform  Post-procedure CNS: normal  Patient tolerance: patient tolerated the procedure well with no immediate complications

## 2022-02-27 NOTE — CONSULTS
Oral and Maxillofacial Surgery Consult    Pt seen 02/27/22 12:24 PM     Assessment  64 y o  female evaluated for facial trauma s/p fall  On bedside clinical exam, there is edema/ecchymosis at the frontal process with no dorsal deviation or septal hematoma of nose  CT head reveals left nasal bone depressed comminuted fractures  Plan:  - No acute surgical intervention from OMFS  - Analgesia as per primary team  - Pt can follow-up as outpatient once hemodynamically stable  - HOB elevated  - Nasal precautions    D/w OMFS attg on call    Inpatient consult to Oral and Maxillofacial Surgery  Consult performed by: Karthikeyan Vega DDS  Consult ordered by: Luzma Morfin PA-C      HPI: 64 y o  female w PMH of HTN who tripped and fell while shopping at [employer], hitting her head on a washing machine  No report of LOC  Presents w neck pain and acute respiratory decline requiring emergent intubation  Found to have comminuted left nasal bone fracture on CT head  Has other significant injuries including spinal process fractures and prevertebral hematoma  Currently intubated in the ICU and history obtained from medical records      PMH:   Past Medical History:   Diagnosis Date    Hypertension       Allergies:   No Known Allergies    Meds:     Current Facility-Administered Medications:     chlorhexidine (PERIDEX) 0 12 % oral rinse 15 mL, 15 mL, Mouth/Throat, Q12H [facility], Ivis Hamm MD, 15 mL at 02/27/22 0835    dexamethasone (DECADRON) injection 8 mg, 8 mg, Intravenous, Q8H [facility], Aviva Leach DO, 8 mg at 02/27/22 0548    fentaNYL 1000 mcg in sodium chloride 0 9% 100mL infusion, 100 mcg/hr, Intravenous, Continuous, Devonte Cordoba MD, Last Rate: 10 mL/hr at 02/27/22 0350, 100 mcg/hr at 02/27/22 0350    fentanyl citrate (PF) 100 MCG/2ML 50 mcg, 50 mcg, Intravenous, Q1H PRN, Chris Oliva, 50 mcg at 02/26/22 1900    insulin lispro (HumaLOG) 100 units/mL subcutaneous injection 1-5 Units, 1-5 Units, Subcutaneous, Q6H [facility] **AND** Fingerstick Glucose (POCT), , , Q6H, Gardenia Clark PA-C    Labetalol HCl (NORMODYNE) injection 10 mg, 10 mg, Intravenous, Q4H PRN, Betty Frey MD, 10 mg at 02/27/22 0049    lactated ringers infusion, 125 mL/hr, Intravenous, Continuous, Betty Frey MD, Last Rate: 125 mL/hr at 02/27/22 0553, 125 mL/hr at 02/27/22 0553    metoclopramide (REGLAN) injection 10 mg, 10 mg, Intravenous, Once PRN, Oh Forde CRNA    potassium chloride 20 mEq IVPB (premix), 20 mEq, Intravenous, Q2H, Betty Frey MD, Last Rate: 50 mL/hr at 02/27/22 1203, 20 mEq at 02/27/22 1203    propofol (DIPRIVAN) 1000 mg in 100 mL infusion (premix), 5-50 mcg/kg/min, Intravenous, Titrated, Fer Saldaña MD, Last Rate: 8 7 mL/hr at 02/27/22 0558, 20 mcg/kg/min at 02/27/22 0558    sodium chloride 0 9 % infusion, 125 mL/hr, Intravenous, Continuous, Chris Head MD    PSH:   Past Surgical History:   Procedure Laterality Date    NO PAST SURGERIES      [place] COLONOSCOPY FLX DX W/COLLJ SPEC WHEN PFRMD N/A 2/2/2017    Procedure: COLONOSCOPY;  Surgeon: Roxana Moss MD;  Location: [facility]; Service: Gastroenterology      Family History   Family history unknown: Yes      Review of Systems   Unable to perform ROS: Intubated      Temp:  [96 5 °F (35 8 °C)-99 7 °F (37 6 °C)] 99 3 °F (37 4 °C)  HR:  [] 68  Resp:  [1-32] 13  BP: (103-259)/() 103/54  SpO2:  [97 %-100 %] 100 %     Intake/Output Summary (Last 24 hours) at 2/27/2022 1224  Last data filed at 2/27/2022 0549  Gross per 24 hour   Intake 1440 62 ml   Output 1255 ml   Net 185 62 ml      Physical Exam:  GE: intubated and sedated (fent at 100; prop at 15)  HEENT:    Head: edema and ecchymosis of frontal process  Dorsum of nose non-deviated  No septal hematoma  Inferior border of the mandible is palpable with no bony steps  Mouth: Dentition intact with no fracture teeth or mobile segments  No vestibular swelling/purulence/sinus tract  Unable to assess FOM/uvula  Resp: intubated on ACVC 14/400/50%/6    Lab Results: I have personally reviewed pertinent lab results  Imaging: I have personally reviewed pertinent reports  Counseling / Coordination of Care  Total floor / unit time spent today 30 minutes  Greater than 50% of total time was spent with the patient and / or family counseling and / or coordination of care

## 2022-02-27 NOTE — ANESTHESIA PREPROCEDURE EVALUATION
Procedure:  IR CEREBRAL ANGIOGRAPHY / INTERVENTION    Relevant Problems   CARDIO   (+) Essential hypertension     Hemoglobin    9 2 Low           HX and PE limited by: intubation  Geovani Carlson is a 64 y o  female with h/o hypertension who presents after she fell  She tripped in appliance store and she tripped on a mat and fell forward striking her head against a washing mashine and hyper extended her neck  No LOC  She complained neck pain, No other injury  She presented to [facility] ED by driving herself  CT scan revealed "Nondisplaced fractures in the lamina bilaterally and spinous process at C5 and C6   Displaced ossific focus anterior to the C6-7 disc space suggesting avulsed displaced anterior osteophyte  Large soft tissue abnormality anterior to the C6 and C7 likely represent prevertebral hematoma " She was transferred to [facility] for further management but she started having respiratory distress  She was intubated in ED [facility] for airway protection  CTA showed active arterial extravasation within hematoma anterior to the C6-7 disc space narrowing the trachea    Physical Exam    Airway    Mallampati score: II  TM Distance: >3 FB  Neck ROM: full     Dental       Cardiovascular      Pulmonary      Other Findings        Anesthesia Plan  ASA Score- 4 Emergent    Anesthesia Type- general with ASA Monitors  Additional Monitors: arterial line  Airway Plan: ETT  Plan Factors-    Chart reviewed  Induction- intravenous  Postoperative Plan-     Informed Consent- Anesthetic plan and risks discussed with patient  I personally reviewed this patient with the CRNA  Discussed and agreed on the Anesthesia Plan with the CRNA  Sherly Cardona

## 2022-02-27 NOTE — DISCHARGE INSTRUCTIONS
Today, you underwent a diagnostic cerebral angiogram under the care of Dr Brenda Villegas for evaluation of ***  ? The following instructions will help you care for yourself, or be cared for upon your return home today  These are guidelines for your care right after your surgery only  ? Notify Your Doctor or Nurse if you have any of the following:  ? SYMPTOMS OF WOUND INFECTION--   Increased pain in or around the incision   Swelling around the incision  Any drainage from the incision  Incision separates or opens up  Warmth in the tissues around the incision  Redness or tenderness on the skin near the incision   Fever (temperature greater than 101 degrees F)   ? NEUROLOGICAL CHANGES--  Change in alertness  Increased sleepiness   Nausea and vomiting   New onset of numbness or weakness in arms or legs   New problems with your bowels or bladder  New or worse problems with balance or walking  Seizures, new or worsening  ? UNRELIEVED HEADACHE PAIN--  New or increased pain unrelieved with pain medications   Pain associated with nausea and vomiting   Pain associated with other symptoms  ? QUESTIONS OR PROBLEMS--  Any questions or problems that you are unsure about  Wound Care:  Keep Incision Clean and Dry   You may shower daily, but do not soak incision  Pat dry after showering  No tub baths, soaking, swimming for 1 week after angiogram    You do not need to cover the incision  Mild to moderate bruising and tenderness to the site is expected and may last up to 1-2 weeks after your procedure  ?  A closure device was placed at the catheter insertion site  This is MRI compatible  Remove the dressing 24 hours after your procedure  If your groin site is bleeding, apply firm pressure for 10 minutes  Reinforce dressing rather than removing and checking frequently  If continues to bleed through the dressing after 1 hour, contact your neurosurgeon's office  Anticipatory Education:  ?   PAIN MED W/ Acetaminophen (Tylenol)  --IF a prescription for pain medicine has been sent home with you:  --Narcotic pain medication may cause constipation  Be sure to take stool softeners or laxatives while you are on narcotic pain medication  --Do not drive after taking prescription pain medicine  ?  If this medicine is too strong, or no longer necessary, or we did NOT recommend/prescribe oral narcotics, you may take:   - Tylenol Extra-strength/Acetaminophen, 2 tablets every 4-6 hours as needed for mild pain  DO NOT TAKE MORE THAN 4000MG PER DAY from combined sources  NOTE: Remember to eat when taking pain medicines in order to avoid nausea  Watch for constipation  Eat plenty of fruits, vegetables, juices, and drink 6-8 glasses of water each day  Constipation: Stay active and drink at least 6-8 cups of fluid each day to prevent constipation  If you need a laxative or stool softener follow the package directions or consult with your local pharmacists if you have questions  ? After anesthesia, rest for 24 hours  Do not drive, drink alcohol beverages or make any important decisions during this time  General anesthesia may cause sore throat, jaw discomfort or muscle aches  These symptoms can last for one or two days  Activity: Please follow these instructions:  Advance your activity as you can tolerate  You may do light house work; nothing strenuous   You may walk all you want  You may go up and down the steps  Use the railing for support  Do not do excessive bending, straining or heavy lifting for 48 hours after your procedure  Do not drive or return to work until you are instructed   It is normal for your energy level and sleep patterns to change after surgery  Get extra sleep at night and take naps during the day to help you feel less tired  Take rest periods during the day  Complete recovery may take several weeks  ?  You may resume driving after 72-85 hours recovery  You may return to work after 48 hours of recovery     ?  Diet:  Your doctor has recommended that you follow these diet instructions at home  Refer to the patient education materials you received during your hospital stay  If you would like more nutrition counseling, ask your doctor about making an appointment with an outpatient dietitian  Resume your home diet  ? Medications:  Please resume your home medications as instructed  ? Home Supplies and Equipment:  none  Additional Contacts:  ? CONTACTS FOR NEUROSURGERY: You may call your neurosurgeons office if you have questions between 8:30 am and 4:30 pm  You may request to speak to the nurse practitioner who is available Monday through Friday  ?  For off hours or the weekend you may call your neurosurgeon's office to leave a message

## 2022-02-28 NOTE — CONSULTS
Interventional Radiology  Consultation 2/28/2022     Consults  Geovani Carlson   1945   [id number]      Assessment/Plan:  64year old female s/p ground level fall with cervical spine fractures and prevertebral hematoma  Found to have acute drop in hgb  CT abd/pelvis showed small right retroperitoneal bleed  No active extravasation seen, however contrast timing is not ideal for evaluation of active bleeding  Currently the patient's vitals are stable and her hgb has responded appropriately to transfusion  No need for CTA at this time or embolization  If hgb trends down again would recommend repeat imaging  Would get non-contrast study first as the patient is in [place]  If the retroperitoneal bleed appears unchanged in size then would hold on contrast      Medical Problems             Problem List     * (Principal) Cervical spine fracture [facility])    Essential hypertension    Medicare annual wellness visit, initial    BMI 29 0-29 9,adult    Hematoma of neck                  Subjective:     Patient ID: Geovani Carlson is a 64 y o  female  History of Present Illness  64year old female s/p ground level fall with cervical spine fractures and prevertebral hematoma  Found to have acute drop in hgb  CT abd/pelvis showed small right retroperitoneal bleed  No active extravasation seen, however contrast timing is not ideal for evaluation of active bleeding  Review of Systems  as above    Past Medical History:   Diagnosis Date    Hypertension         Past Surgical History:   Procedure Laterality Date    NO PAST SURGERIES      [place] COLONOSCOPY FLX DX W/COLLJ SPEC WHEN PFRMD N/A 2/2/2017    Procedure: COLONOSCOPY;  Surgeon: Angelica Roth MD;  [facility];   Service: Gastroenterology        Social History     Tobacco Use   Smoking Status Former Smoker   Smokeless Tobacco Never Used        Social History     Substance and Sexual Activity   Alcohol Use No        Social History     Substance and Sexual Activity Drug Use No        No Known Allergies    Current Facility-Administered Medications   Medication Dose Route Frequency Provider Last Rate Last Admin    chlorhexidine (PERIDEX) 0 12 % oral rinse 15 mL  15 mL Mouth/Throat Q12H [facility] Keith Solitario MD   15 mL at 02/28/22 0856    dexamethasone (DECADRON) injection 8 mg  8 mg Intravenous Q8H [facility] Aviva Leach,    8 mg at 02/28/22 1403    fentaNYL 1000 mcg in sodium chloride 0 9% 100mL infusion  75 mcg/hr Intravenous Continuous Patel Romero MD 7 5 mL/hr at 02/28/22 0856 75 mcg/hr at 02/28/22 0856    fentanyl citrate (PF) 100 MCG/2ML 50 mcg  50 mcg Intravenous Q1H PRN Fritz G Luis Eduardo   50 mcg at 02/28/22 1734    hydrALAZINE (APRESOLINE) injection 10 mg  10 mg Intravenous Q6H PRN ETIENNE Thornton        insulin lispro (HumaLOG) 100 units/mL subcutaneous injection 1-5 Units  1-5 Units Subcutaneous Q6H [facility] Gardenia Clark PA-C        metoclopramide (REGLAN) injection 10 mg  10 mg Intravenous Once PRN Stephanie Tomlin CRNA        multi-electrolyte (PLASMALYTE-A/ISOLYTE-S PH 7 4) IV solution  125 mL/hr Intravenous Continuous Patel Romero  mL/hr at 02/28/22 1501 125 mL/hr at 02/28/22 1501    niCARdipine (CARDENE) 25 mg (STANDARD CONCENTRATION) in sodium chloride 0 9% 250 mL  1-15 mg/hr Intravenous Titrated ETIENNE Thornton        propofol (DIPRIVAN) 1000 mg in 100 mL infusion (premix)  5-50 mcg/kg/min Intravenous Titrated Shay Ng MD 6 5 mL/hr at 02/28/22 1412 15 mcg/kg/min at 02/28/22 1412          Objective:    Vitals:    02/28/22 1600 02/28/22 1700 02/28/22 1730 02/28/22 1800   BP:  151/74 170/80 165/79   Pulse: 72 64 64 64   Resp: 13 12 12 14   Temp: 98 1 °F (36 7 °C)   98 °F (36 7 °C)   TempSrc: Oral   Oral   SpO2: 100% 100% 100% 99%   Weight:       Height:            Physical Exam  not performed    No results found for: BNP   Lab Results   Component Value Date    WBC 9 91 02/28/2022    HGB 9 5 (L) 02/28/2022    HCT 27 5 (L) 02/28/2022     (H) 02/28/2022     (L) 02/28/2022     Lab Results   Component Value Date    INR 1 12 02/26/2022    INR 1 05 12/06/2016    PROTIME 14 2 02/26/2022    PROTIME 11 0 12/06/2016     Lab Results   Component Value Date    PTT 28 02/26/2022       I spent 10 minutes reviewing this patient's chart and imaging, discussing care with the consulting team and formulating a plan  I have personally reviewed pertinent imaging and laboratory results  Code Status: Level 1 - Full Code  Advance Directive and Living Will:      Power of :    POLST:      IR has been consulted to evaluate the patient, determine the appropriate procedure, and whether or not a procedure can and should be performed  Thank you for allowing me to participate in the care of [facility]  Please don't hesitate to call, text, email, or TigerText with any questions  This text is generated with voice recognition software  There may be translation, syntax,  or grammatical errors  If you have any questions, please contact the dictating provider

## 2022-02-28 NOTE — ASSESSMENT & PLAN NOTE
Bilateral laminae and spinous process fractures of C5 and C6   · S/p mechanical trip and fall with hyperextension injury into washing machine  · Large anterior cervical spine prevertebral hematoma with active extravasation s/p angiogram   · Intubated for airway protection in setting of large hematoma  · Current exam no-focal  In aspen collar  Denies neck pain or back pain  Moving all extremities  Denies numbness or weakness  Imaging:  · Cerebral angiogram 2/26/2022: negative, no active identified hemorrhage  · MRI cervical spine wo, 2/26/2022: Slight widening of the anterior aspect of the C6-7 disc space with rupture of the anterior longitudinal ligament  The posterior longitudinal ligament and ligamentum flavum appear intact  Patient has known posterior element fractures at C5 and C6 incompletely evaluated on this examination  There is mild interspinous ligament injury in the posterior soft tissues extending from C2 through C5  Large prevertebral space hematoma anterior to the cervical spine as described above  · CT cervical spine wo, 2/26/2022: Extensive multilevel degenerative disc disease as above  Nondisplaced fractures in the lamina bilaterally and spinous process at C5 and C6  Displaced ossific focus anterior to the C6-7 disc space suggesting avulsed displaced anterior osteophyte  Large soft tissue abnormality anterior to the C6 and C7 likely represent prevertebral hematoma  Plan:  · Continue to monitor neuro exam closely  · Transfuse as needed for active extravasation - Hgb 6 5, s/p 2 units PRBC  · Maintain in cervical brace at all times  · Hold PT/OT secondary to instability  · Wean to extubate as able  · Maintain MAP > 85 mmHg  · Anticipate surgical intervention (C3/C4-T1 posterior decompression/fusion) once hemodynamically stable, TBD  · DVT ppx: SCDs  Neurosurgery will continue to follow closely  Please call with any questions or concerns

## 2022-02-28 NOTE — PROGRESS NOTES
Daily Progress Note - [address] 64 y o  female MRN: [id number]  Unit/Bed#: ICU 09 Encounter: [id number]        ----------------------------------------------------------------------------------------  HPI/24hr events: 64 F with Hx HTN presented following fall with acute neck injury  CT scan showing C5-6 level laminar fractures and neck hematoma, clinically with signs of airway compromise  And stridor but without focal neurologic deficits on arrival [facility] 2[facility]  Patient was intubated for airway protection  Overnight Hypotensive transiently, not improved with weaning of sedation  Positive bedside volume responsiveness assessment with passive leg raise, Given 1L bolus overnight with improving MAPs   Acutely agitated upon awakening, maintained with sedation and cervical collar     ---------------------------------------------------------------------------------------  SUBJECTIVE  Patient intubated, sedated     Review of Systems  Review of systems was unable to be performed secondary to intubation  ---------------------------------------------------------------------------------------  Assessment and Plan:    Neuro:    Diagnosis: Sedation and analgesia   o Plan: Continue Propofol Gtt, Goal RASS -2  o Continue fentanyl 100mcg/hr infusion   o Sedation breaks, neuro checks Q2h  o Fenatyl 50mcg Q1h PRN for acute breakthrough pain      Diagnosis: Acute cervical fractures and ligamentous injury   o Plan: MRI obtained, neurosurgical planning per consultant service  o Continue cervical brace, cervical precautions   o Continue Q2h neuro checks   o Repeat Ct for worsening Neurologic examination or development of focal deficits       CV:    Diagnosis: Hx of Hypertension   o Plan: Home antihypertensive regimen held  o PRN labetalol 10mg Q6h for SBP >160  o Continue hemodynamic monitoring         Pulm:   Diagnosis: Acute Airway compromise due to Prevertebral hematoma   o Plan: Maintain airway, continue mechanical ventilation   o AC/VC 14/400/50%/6  o Continue adult ventilator management protocol, oral hygiene, sedation mgmt      GI:    Diagnosis: GI PPx   o Plan: Continue IV pepcid      :    Diagnosis: No acute issues  o Plan: Hayes catheter care  o Strict I/O recording        F/E/N:    Plan:    NPO/NGT   Isolyte 125cc/hr   Replace electrolytes PRN, Goal K>4, P>3, Mg>2      Heme/Onc:    Diagnosis: ABLA in setting of acute Prevertebral hematoma (no active extravasation on 2/26 4 vessel angiography) Transfused 1U p RBC 2/27, 1U p RBC 2/28  o Plan: Trend Q6h CBC, monitor Hemoglobin  o Transfuse to maintain Hb >7 0  o No pharmacologic DVT PPx  o Continue mechanical DVT PPx       Endo:    Diagnosis: No acute issues  o Plan: POCT Q6h while NPO  o Avoid hypoglycemia         ID:    Diagnosis: No active issues  o Plan: Monitor for signs and symptoms of infection  o Trend temperature curve, WBC      MSK/Skin:    Diagnosis: C5-C6 posterior laminar Fractures, C6-7 displaced osteophyte, Prevertebral hematoma   o Plan: Continue mechanical ventilation for airway protection  o Maintain C-collar  o Cervical spine precautions  o Surgical planning per NSGY  o    Diagnosis: b/l comminuted nasal bone fractures  o Plan: No acute surgical intervention     Diagnosis: superficial abrasions/lacerations  o Plan: continue local wound care     Patient appropriate for transfer out of the ICU today?: No  Disposition: Continue Critical Care   Code Status: Level 1 - Full Code  ---------------------------------------------------------------------------------------  ICU CORE MEASURES    Prophylaxis   VTE Pharmacologic Prophylaxis: Pharmacologic VTE Prophylaxis contraindicated due to Acute Hematoma  VTE Mechanical Prophylaxis: sequential compression device  Stress Ulcer Prophylaxis: Famotidine IV     ABCDE Protocol (if indicated)  Plan to perform spontaneous awakening trial today? Yes  Plan to perform spontaneous breathing trial today? Yes  Obvious barriers to extubation? No  CAM-ICU: Negative    Invasive Devices Review  Invasive Devices  Report    Peripheral Intravenous Line            Peripheral IV Right Forearm -- days    Peripheral IV 22 Proximal;Right;Ventral (anterior) Forearm 1 day    Peripheral IV 22 Left;Proximal;Ventral (anterior) Antecubital 1 day          Arterial Line            Arterial Line 22 Left Radial 1 day          Drain            NG/OG/Enteral Tube Orogastric 16 Fr Left mouth 1 day    Urethral Catheter Temperature probe 16 Fr  1 day          Airway            ETT  Cuffed 6 mm -- days              Can any invasive devices be discontinued today? No  ---------------------------------------------------------------------------------------  OBJECTIVE    Vitals   Vitals:    22 0352 22 0400 22 0500 22 0600   BP:       BP Location:       Pulse:  86 56 (!) 52   Resp:  20 14 14   Temp:  98 °F (36 7 °C)     TempSrc:  Bladder     SpO2: 100% 100% 99% 99%   Weight:       Height:         Temp (24hrs), Av 3 °F (37 4 °C), Min:98 °F (36 7 °C), Max:99 7 °F (37 6 °C)  Current: Temperature: 98 °F (36 7 °C)  HR: 54  BP: 100/60  RR: 14  SpO2: 99%    Respiratory:  SpO2: SpO2: 99 %  Nasal Cannula O2 Flow Rate (L/min): 2 L/min    Invasive/non-invasive ventilation settings   Respiratory  Report   Lab Data (Last 4 hours)    None         O2/Vent Data (Last 4 hours)       035           Vent Mode AC/VC       Resp Rate (BPM) (BPM) 14       Vt (mL) (mL) 400       FIO2 (%) (%) 50  Comment: found at 50%       PEEP (cmH2O) (cmH2O) 6       MV 9 9                   Physical Exam  Vitals reviewed  Constitutional:       General: She is not in acute distress  Appearance: She is normal weight  Interventions: She is sedated, intubated and restrained  Cervical collar in place  HENT:      Head: Normocephalic  Mouth/Throat:      Mouth: Mucous membranes are dry     Eyes:      General: No scleral icterus  Pupils: Pupils are equal, round, and reactive to light  Cardiovascular:      Rate and Rhythm: Normal rate and regular rhythm  Pulmonary:      Effort: She is intubated  Abdominal:      General: There is no distension  Palpations: Abdomen is soft  Tenderness: There is no abdominal tenderness  Lymphadenopathy:      Cervical: No cervical adenopathy  Skin:     General: Skin is warm and dry  Capillary Refill: Capillary refill takes 2 to 3 seconds  Neurological:      GCS: GCS eye subscore is 4  GCS verbal subscore is 1  GCS motor subscore is 6  Motor: No weakness               Laboratory and Diagnostics:  Results from last 7 days   Lab Units 02/27/22  2359 02/27/22  1844 02/27/22  1147 02/27/22  0450 02/26/22  1531   WBC Thousand/uL  --   --   --  9 64 6 67   HEMOGLOBIN g/dL 6 7* 7 0* 7 7* 6 8* 9 2*   HEMATOCRIT % 21 0* 22 0* 24 1* 21 2* 29 5*   PLATELETS Thousands/uL  --   --   --  140* 173   NEUTROS PCT %  --   --   --  86* 65   MONOS PCT %  --   --   --  3* 6     Results from last 7 days   Lab Units 02/28/22  0518 02/27/22  0450 02/26/22  1531   SODIUM mmol/L 136 137 138   POTASSIUM mmol/L 4 4 3 4* 3 4*   CHLORIDE mmol/L 111* 110* 106   CO2 mmol/L 22 24 27   ANION GAP mmol/L 3* 3* 5   BUN mg/dL 54* 29* 31*   CREATININE mg/dL 1 74* 1 04 1 00   CALCIUM mg/dL 8 6 8 8 8 6   GLUCOSE RANDOM mg/dL 140 163* 104   ALT U/L  --   --  63   AST U/L  --   --  32   ALK PHOS U/L  --   --  98   ALBUMIN g/dL  --   --  2 2*   TOTAL BILIRUBIN mg/dL  --   --  0 43     Results from last 7 days   Lab Units 02/28/22  0518 02/27/22  0450   MAGNESIUM mg/dL 2 0 1 6   PHOSPHORUS mg/dL 5 9* 6 1*      Results from last 7 days   Lab Units 02/26/22  1545   INR  1 12   PTT seconds 28              ABG:  Results from last 7 days   Lab Units 02/27/22  0748   PH ART  7 405   PCO2 ART mm Hg 37 2   PO2 ART mm Hg 139 8*   HCO3 ART mmol/L 22 8   BASE EXC ART mmol/L -1 7   ABG SOURCE  Line, Arterial     VBG:  Results from last 7 days   Lab Units 02/27/22  0748   ABG SOURCE  Line, Arterial           Micro        EKG: n/a  Imaging:  I have personally reviewed pertinent reports  and I have personally reviewed pertinent films in PACS     XR chest portable    Result Date: 2/27/2022  Impression: Endotracheal tube as above  Widening of the right paratracheal stripe indicative of underlying adenopathy or mass  Right lower lobe subsegmental atelectasis  Workstation performed: [id number]     CT head without contrast    Result Date: 2/26/2022  Impression: No acute intracranial abnormality  Left nasal bone depressed comminuted fractures  Soft tissue swelling over the left infraorbital, nasal and supraorbital regions  The study was marked in [facility] for immediate notification  Workstation performed: [id number]     CTA neck w wo contrast    Result Date: 2/26/2022  Impression: There is an enlarging, large prevertebral hematoma within the cervical spine tracking inferiorly into the upper mediastinum  This is displacing the trachea anteriorly and resulting in moderate tracheal narrowing at the level of the tip of the endotracheal tube  There is brisk active arterial extravasation of contrast noted within the prevertebral hematoma immediately anterior to the C6-7 disc space, corresponding to a site of active bleeding  There is now widening of the disc space of C6-7 anteriorly, not present on the initial CT scan performed approximately 4 hours ago consistent with anterior longitudinal ligament disruption  Small avulsion fracture suspected  Stable posterior element fractures involving C5 and C6  No subluxation  I personally discussed this study with the trauma fellow on 2/26/2022 at 7:20 PM  Workstation performed: [id number]     CT cervical spine without contrast    Result Date: 2/26/2022  Impression: Extensive multilevel degenerative disc disease as above  Nondisplaced fractures in the lamina bilaterally and spinous process at C5 and C6  Displaced ossific focus anterior to the C6-7 disc space suggesting avulsed displaced anterior osteophyte  Large soft tissue abnormality anterior to the C6 and C7 likely represent prevertebral hematoma  I personally discussed this study with Yan Bryan on 2/26/2022 at 3:21 PM   Workstation performed: [id number]     MRI cervical spine wo contrast    Result Date: 2/27/2022  Impression: Slight widening of the anterior aspect of the C6-7 disc space with rupture of the anterior longitudinal ligament  The posterior longitudinal ligament and ligamentum flavum appear intact  Patient has known posterior element fractures at C5 and C6 incompletely evaluated on this examination  There is mild interspinous ligament injury in the posterior soft tissues extending from C2 through C5  Large prevertebral space hematoma anterior to the cervical spine as described above  No cord compression or abnormal cord signal identified  Workstation performed: [id number]     CT recon (no charge)    Result Date: 2/26/2022  Impression: Acute mildly displaced left nasal bone fractures  Otherwise no facial fracture identified  Hematoma anterior to the frontal bone in the midline tracks inferiorly into the preseptal soft tissues, left greater than right and into the left cheek  Workstation performed: [id number]       Intake and Output  I/O       02/26 0701  02/27 0700 02/27 0701  02/28 0700    I V  (mL/kg) 1440 6 (19 7) 1582 3 (21 7)    Total Intake(mL/kg) 1440 6 (19 7) 1582 3 (21 7)    Urine (mL/kg/hr) 1255 475 (0 4)    Emesis/NG output  100    Total Output 1255 575    Net +185 6 +1007 3              UOP: 75 ml/hr     Height and Weights   Height: 5' 5" (165 1 cm)     Body mass index is 26 78 kg/m²    Weight (last 2 days)     Date/Time Weight    02/27/22 0007 73 (160 94)            Nutrition       Diet Orders   (From admission, onward)             Start     Ordered    02/26/22 1942  Diet NPO  Diet effective now        References:    Nutrtion Support Algorithm Enteral vs  Parenteral   Question Answer Comment   Diet Type NPO    RD to adjust diet per protocol? No        02/26/22 1949                  Active Medications  Scheduled Meds:  Current Facility-Administered Medications   Medication Dose Route Frequency Provider Last Rate    chlorhexidine  15 mL Mouth/Throat Q12H [address] Flip Mills MD      dexamethasone  8 mg Intravenous [facility] Aviva Leach DO      fentaNYL  75 mcg/hr Intravenous Continuous Kathy Thornton MD 75 mcg/hr (02/28/22 0529)    fentanyl citrate (PF)  50 mcg Intravenous Q1H PRN Darrall Bibi      insulin lispro  1-5 Units Subcutaneous Q6H [address] Gardenia Clark PA-C      Labetalol HCl  10 mg Intravenous Q4H PRN Flip Mills MD      metoclopramide  10 mg Intravenous Once PRN Hector Perkins CRNA      multi-electrolyte  125 mL/hr Intravenous Continuous Kathy Thornton  mL/hr (02/28/22 9477)    propofol  5-50 mcg/kg/min Intravenous Titrated Jenn Dimas MD 15 mcg/kg/min (02/27/22 2117)     Continuous Infusions:  fentaNYL, 75 mcg/hr, Last Rate: 75 mcg/hr (02/28/22 0529)  multi-electrolyte, 125 mL/hr, Last Rate: 125 mL/hr (02/28/22 0624)  propofol, 5-50 mcg/kg/min, Last Rate: 15 mcg/kg/min (02/27/22 2117)      PRN Meds:   fentanyl citrate (PF), 50 mcg, Q1H PRN  Labetalol HCl, 10 mg, Q4H PRN  metoclopramide, 10 mg, Once PRN        Allergies   No Known Allergies  ---------------------------------------------------------------------------------------  Advance Directive and Living Will:      Power of :    POLST:    ---------------------------------------------------------------------------------------  Care Time Delivered:   See Raman Cline MD       Portions of the record may have been created with voice recognition software  Occasional wrong word or "sound a like" substitutions may have occurred due to the inherent limitations of voice recognition software    Read the chart carefully and recognize, using context, where substitutions have occurred

## 2022-02-28 NOTE — OCCUPATIONAL THERAPY NOTE
OT CANCEL NOTE    OT orders received  Chart reviewed  Pt is currently intubated/sedated and not appropriate to engage in skilled OT services at this time  Will hold initial OT evaluation  Will continue to follow pt on caseload and see pt when medically stable and as clinically appropriate  02/28/22 0487   OT Last Visit   OT Visit Date 02/28/22   Note Type   Note type Evaluation; Cancelled Session   Cancel Reasons Intubated/sedated       Rebel Mccoy MS, OTR/L

## 2022-02-28 NOTE — PLAN OF CARE
Problem: Prexisting or High Potential for Compromised Skin Integrity  Goal: Skin integrity is maintained or improved  Description: INTERVENTIONS:  - Identify patients at risk for skin breakdown  - Assess and monitor skin integrity  - Assess and monitor nutrition and hydration status  - Monitor labs   - Assess for incontinence   - Turn and reposition patient  - Assist with mobility/ambulation  - Relieve pressure over bony prominences  - Avoid friction and shearing  - Provide appropriate hygiene as needed including keeping skin clean and dry  - Evaluate need for skin moisturizer/barrier cream  - Collaborate with interdisciplinary team   - Patient/family teaching  - Consider wound care consult   Outcome: Progressing     Problem: PAIN - ADULT  Goal: Verbalizes/displays adequate comfort level or baseline comfort level  Description: Interventions:  - Encourage patient to monitor pain and request assistance  - Assess pain using appropriate pain scale  - Administer analgesics based on type and severity of pain and evaluate response  - Implement non-pharmacological measures as appropriate and evaluate response  - Consider cultural and social influences on pain and pain management  - Notify physician/advanced practitioner if interventions unsuccessful or patient reports new pain  Outcome: Progressing     Problem: INFECTION - ADULT  Goal: Absence or prevention of progression during hospitalization  Description: INTERVENTIONS:  - Assess and monitor for signs and symptoms of infection  - Monitor lab/diagnostic results  - Monitor all insertion sites, i e  indwelling lines, tubes, and drains  - Monitor endotracheal if appropriate and nasal secretions for changes in amount and color  - [place] appropriate cooling/warming therapies per order  - Administer medications as ordered  - Instruct and encourage patient and family to use good hand hygiene technique  - Identify and instruct in appropriate isolation precautions for identified infection/condition  Outcome: Progressing  Goal: Absence of fever/infection during neutropenic period  Description: INTERVENTIONS:  - Monitor WBC    Outcome: Completed     Problem: SAFETY ADULT  Goal: Patient will remain free of falls  Description: INTERVENTIONS:  - Educate patient/family on patient safety including physical limitations  - Instruct patient to call for assistance with activity   - Consult OT/PT to assist with strengthening/mobility   - Keep Call bell within reach  - Keep bed low and locked with side rails adjusted as appropriate  - Keep care items and personal belongings within reach  - Initiate and maintain comfort rounds  - Make Fall Risk Sign visible to staff  - Offer Toileting every 2 Hours, in advance of need  - Initiate/Maintain bed alarm  - Apply yellow socks and bracelet for high fall risk patients  - Consider moving patient to room near nurses station  Outcome: Progressing

## 2022-02-28 NOTE — PROGRESS NOTES
[facility]  Progress Note - Hayden Mcdermott 1945, 64 y o  female MRN: [id number]  Unit/Bed#: ICU 09 Encounter: [id number]  Primary Care Provider: Penelope Sifuentes MD   Date and time admitted to hospital: 2/26/2022  5:25 PM    Hematoma of neck  Assessment & Plan  See above  Evidence of active extravasation although angiogram was negative  * Cervical spine fracture (HCC)  Assessment & Plan  Bilateral laminae and spinous process fractures of C5 and C6   · S/p mechanical trip and fall with hyperextension injury into washing machine  · Large anterior cervical spine prevertebral hematoma with active extravasation s/p angiogram   · Intubated for airway protection in setting of large hematoma  · Current exam no-focal  In aspen collar  Denies neck pain or back pain  Moving all extremities  Denies numbness or weakness  Imaging:  · Cerebral angiogram 2/26/2022: negative, no active identified hemorrhage  · MRI cervical spine wo, 2/26/2022: Slight widening of the anterior aspect of the C6-7 disc space with rupture of the anterior longitudinal ligament  The posterior longitudinal ligament and ligamentum flavum appear intact  Patient has known posterior element fractures at C5 and C6 incompletely evaluated on this examination  There is mild interspinous ligament injury in the posterior soft tissues extending from C2 through C5  Large prevertebral space hematoma anterior to the cervical spine as described above  · CT cervical spine wo, 2/26/2022: Extensive multilevel degenerative disc disease as above  Nondisplaced fractures in the lamina bilaterally and spinous process at C5 and C6  Displaced ossific focus anterior to the C6-7 disc space suggesting avulsed displaced anterior osteophyte  Large soft tissue abnormality anterior to the C6 and C7 likely represent prevertebral hematoma  Plan:  · Continue to monitor neuro exam closely    · Transfuse as needed for active extravasation - Hgb 6 5, s/p 2 units PRBC  · Maintain in cervical brace at all times  · Hold PT/OT secondary to instability  · Wean to extubate as able  · Maintain MAP > 85 mmHg  · Anticipate surgical intervention (C3/C4-T1 posterior decompression/fusion) once hemodynamically stable, TBD  · DVT ppx: SCDs  Neurosurgery will continue to follow closely  Please call with any questions or concerns  Subjective/Objective   Chief Complaint: N/A    Subjective: Intubated and sedated  Objective: Nodding yes and no to questions  Denies headache or pain  GCS 10T (E3, V1T, M6)  I/O       02/26 0701 02/27 0700 02/27 0701 02/28 0700 02/28 0701 03/01 0700    I V  (mL/kg) 1440 6 (19 7) 3046 (41 7) 228 (3 1)    IV Piggyback  1000     Total Intake(mL/kg) 1440 6 (19 7) 4046 (55 4) 228 (3 1)    Urine (mL/kg/hr) 1255 810 (0 5) 100 (0 5)    Emesis/NG output  100     Total Output 1255 910 100    Net +185 6 +3136 +128                 Invasive Devices  Report    Peripheral Intravenous Line            Peripheral IV Right Forearm -- days    Peripheral IV 02/26/22 Proximal;Right;Ventral (anterior) Forearm 1 day    Peripheral IV 02/27/22 Left;Proximal;Ventral (anterior) Antecubital 1 day          Arterial Line            Arterial Line 02/26/22 Left Radial 1 day          Drain            NG/OG/Enteral Tube Orogastric 16 Fr Left mouth 1 day    Urethral Catheter Temperature probe 16 Fr  1 day          Airway            ETT  Cuffed 6 mm -- days                Physical Exam:  Vitals: Blood pressure 116/59, pulse (!) 54, temperature 97 7 °F (36 5 °C), temperature source Axillary, resp  rate 13, height 5' 5" (1 651 m), weight 73 kg (160 lb 15 oz), SpO2 100 %  ,Body mass index is 26 78 kg/m²      Hemodynamic Monitoring: MAP: Arterial Line MAP (mmHg): 52 mmHg    General appearance: critically ill, intubated and sedated  Head: hematoma at bridge of nose  Eyes: EOMI, PERRL  Neck: aspen brace  Back: no kyphosis present  Lungs: mechanically ventilated, AC/VC  Heart: regular heart rate  Neurologic:   Mental status: GCS 10T  Cranial nerves: grossly intact (Cranial nerves II-XII)  Sensory: normal to LT  Motor: moving all extremities without focal weakness  Reflexes: 2+ and symmetric      Lab Results:  Results from last 7 days   Lab Units 02/28/22  0518 02/27/22  2359 02/27/22  1844 02/27/22  1147 02/27/22  0450 02/26/22  1531 02/26/22  1531   WBC Thousand/uL 9 91  --   --   --  9 64  --  6 67   HEMOGLOBIN g/dL 6 5* 6 7* 7 0*   < > 6 8*   < > 9 2*   HEMATOCRIT % 20 0* 21 0* 22 0*   < > 21 2*   < > 29 5*   PLATELETS Thousands/uL 107*  --   --   --  140*  --  173   NEUTROS PCT %  --   --   --   --  86*  --  65   MONOS PCT %  --   --   --   --  3*  --  6    < > = values in this interval not displayed  Results from last 7 days   Lab Units 02/28/22  0518 02/27/22  0450 02/26/22  1531   POTASSIUM mmol/L 4 4 3 4* 3 4*   CHLORIDE mmol/L 111* 110* 106   CO2 mmol/L 22 24 27   BUN mg/dL 54* 29* 31*   CREATININE mg/dL 1 74* 1 04 1 00   CALCIUM mg/dL 8 6 8 8 8 6   ALK PHOS U/L  --   --  98   ALT U/L  --   --  63   AST U/L  --   --  32     Results from last 7 days   Lab Units 02/28/22  0518 02/27/22  0450   MAGNESIUM mg/dL 2 0 1 6     Results from last 7 days   Lab Units 02/28/22  0518 02/27/22  0450   PHOSPHORUS mg/dL 5 9* 6 1*     Results from last 7 days   Lab Units 02/26/22  1545   INR  1 12   PTT seconds 28     No results found for: TROPONINT  ABG:  Lab Results   Component Value Date    PHART 7 405 02/27/2022    QJD3FTP 37 2 02/27/2022    PO2ART 139 8 (H) 02/27/2022    KFH9DKW 22 8 02/27/2022    BEART -1 7 02/27/2022    SOURCE Line, Arterial 02/27/2022       Imaging Studies: I have personally reviewed pertinent reports  and I have personally reviewed pertinent films in PACS    XR chest portable    Result Date: 2/27/2022  Impression: Endotracheal tube as above  Widening of the right paratracheal stripe indicative of underlying adenopathy or mass  Right lower lobe subsegmental atelectasis  Workstation performed: [id number]     CT head without contrast    Result Date: 2/26/2022  Impression: No acute intracranial abnormality  Left nasal bone depressed comminuted fractures  Soft tissue swelling over the left infraorbital, nasal and supraorbital regions  The study was marked in [facility] for immediate notification  Workstation performed: [id number]     CTA neck w wo contrast    Result Date: 2/26/2022  Impression: There is an enlarging, large prevertebral hematoma within the cervical spine tracking inferiorly into the upper mediastinum  This is displacing the trachea anteriorly and resulting in moderate tracheal narrowing at the level of the tip of the endotracheal tube  There is brisk active arterial extravasation of contrast noted within the prevertebral hematoma immediately anterior to the C6-7 disc space, corresponding to a site of active bleeding  There is now widening of the disc space of C6-7 anteriorly, not present on the initial CT scan performed approximately 4 hours ago consistent with anterior longitudinal ligament disruption  Small avulsion fracture suspected  Stable posterior element fractures involving C5 and C6  No subluxation  I personally discussed this study with the trauma fellow on 2/26/2022 at 7:20 PM  Workstation performed: [id number]     CT cervical spine without contrast    Result Date: 2/26/2022  Impression: Extensive multilevel degenerative disc disease as above  Nondisplaced fractures in the lamina bilaterally and spinous process at C5 and C6  Displaced ossific focus anterior to the C6-7 disc space suggesting avulsed displaced anterior osteophyte  Large soft tissue abnormality anterior to the C6 and C7 likely represent prevertebral hematoma    I personally discussed this study with Franklin Silva on 2/26/2022 at 3:21 PM   Workstation performed: [id number]     MRI cervical spine wo contrast    Result Date: 2/27/2022  Impression: Slight widening of the anterior aspect of the C6-7 disc space with rupture of the anterior longitudinal ligament  The posterior longitudinal ligament and ligamentum flavum appear intact  Patient has known posterior element fractures at C5 and C6 incompletely evaluated on this examination  There is mild interspinous ligament injury in the posterior soft tissues extending from C2 through C5  Large prevertebral space hematoma anterior to the cervical spine as described above  No cord compression or abnormal cord signal identified  Workstation performed: [id number]     CT recon (no charge)    Result Date: 2/26/2022  Impression: Acute mildly displaced left nasal bone fractures  Otherwise no facial fracture identified  Hematoma anterior to the frontal bone in the midline tracks inferiorly into the preseptal soft tissues, left greater than right and into the left cheek  Workstation performed: [id number]       EKG, Pathology, and Other Studies: I have personally reviewed pertinent reports        VTE Pharmacologic Prophylaxis: Sequential compression device (Venodyne)     VTE Mechanical Prophylaxis: sequential compression device

## 2022-02-28 NOTE — RESPIRATORY THERAPY NOTE
RT Ventilator Management Note  Beckie Dan 64 y o  female MRN: [id number]  Unit/Bed#: ICU 09 Encounter: [id number]      Daily Screen       2/27/2022  0726 2/27/2022  0730          Patient safety screen outcome[de-identified] Failed Failed      Not Ready for Weaning due to[de-identified] Underline problem not resolved Underline problem not resolved              Physical Exam:   Assessment Type: Assess only  General Appearance: Sedated  Respiratory Pattern: Assisted  Chest Assessment: Chest expansion symmetrical  Bilateral Breath Sounds: Clear      Resp Comments: (P) Pt remains on the current vent setting with no issues at this time, with diminished to clear BS  Will continue to monitor per respiratory protocol

## 2022-02-28 NOTE — CASE MANAGEMENT
Case Management Progress Note    Patient name Suhas Ureña  Location ICU 09/ICU 09 MRN [id number]  : 1945 Date 2022       LOS (days): 2  Geometric Mean LOS (GMLOS) (days):   Days to GMLOS:        OBJECTIVE:        Current admission status: Inpatient  Preferred Pharmacy:   [facility]/pharmacy #[id number]- [place] - [facility] [address] [address]  Phone: [phone] Fax: [phone]    Primary Care Provider: Odilia Baer MD    Primary Insurance: [address] [facility]  Secondary Insurance:     PROGRESS NOTE:    Pt remains intubated and sedated   CM will continue to follow for potential d/c recs

## 2022-02-28 NOTE — PHYSICAL THERAPY NOTE
Physical Therapy Cancellation Note    PT orders received chart review completed  Pt is currently intubated/sedated and not appropriate to participate in skilled PT at this time  PT will follow and eval as medically appropriate  02/28/22 153   Note Type   Note type Evaluation; Cancelled Session   Cancel Reasons Intubated/sedated     Adenike Antoine, PT

## 2022-02-28 NOTE — PLAN OF CARE
Problem: MOBILITY - ADULT  Goal: Maintain or return to baseline ADL function  Description: INTERVENTIONS:  -  Assess patient's ability to carry out ADLs; assess patient's baseline for ADL function and identify physical deficits which impact ability to perform ADLs (bathing, care of mouth/teeth, toileting, grooming, dressing, etc )  - Assess/evaluate cause of self-care deficits   - Assess range of motion  - Assess patient's mobility; develop plan if impaired  - Assess patient's need for assistive devices and provide as appropriate  - Encourage maximum independence but intervene and supervise when necessary  - Involve family in performance of ADLs  - Assess for home care needs following discharge   - Consider OT consult to assist with ADL evaluation and planning for discharge  - Provide patient education as appropriate  Outcome: Progressing  Goal: Maintains/Returns to pre admission functional level  Description: INTERVENTIONS:  - Perform BMAT or MOVE assessment daily    - Set and communicate daily mobility goal to care team and patient/family/caregiver  - Collaborate with rehabilitation services on mobility goals if consulted  - Perform Range of Motion multiple times a day  - Reposition patient every 2 hours    - Record patient progress and toleration of activity level   Outcome: Progressing     Problem: Prexisting or High Potential for Compromised Skin Integrity  Goal: Skin integrity is maintained or improved  Description: INTERVENTIONS:  - Identify patients at risk for skin breakdown  - Assess and monitor skin integrity  - Assess and monitor nutrition and hydration status  - Monitor labs   - Assess for incontinence   - Turn and reposition patient  - Assist with mobility/ambulation  - Relieve pressure over bony prominences  - Avoid friction and shearing  - Provide appropriate hygiene as needed including keeping skin clean and dry  - Evaluate need for skin moisturizer/barrier cream  - Collaborate with interdisciplinary team   - Patient/family teaching  - Consider wound care consult   Outcome: Progressing     Problem: PAIN - ADULT  Goal: Verbalizes/displays adequate comfort level or baseline comfort level  Description: Interventions:  - Encourage patient to monitor pain and request assistance  - Assess pain using appropriate pain scale  - Administer analgesics based on type and severity of pain and evaluate response  - Implement non-pharmacological measures as appropriate and evaluate response  - Consider cultural and social influences on pain and pain management  - Notify physician/advanced practitioner if interventions unsuccessful or patient reports new pain  Outcome: Progressing     Problem: INFECTION - ADULT  Goal: Absence or prevention of progression during hospitalization  Description: INTERVENTIONS:  - Assess and monitor for signs and symptoms of infection  - Monitor lab/diagnostic results  - Monitor all insertion sites, i e  indwelling lines, tubes, and drains  - Monitor endotracheal if appropriate and nasal secretions for changes in amount and color  - Somers appropriate cooling/warming therapies per order  - Administer medications as ordered  - Instruct and encourage patient and family to use good hand hygiene technique  - Identify and instruct in appropriate isolation precautions for identified infection/condition  Outcome: Progressing  Goal: Absence of fever/infection during neutropenic period  Description: INTERVENTIONS:  - Monitor WBC    Outcome: Progressing     Problem: SAFETY ADULT  Goal: Maintain or return to baseline ADL function  Description: INTERVENTIONS:  -  Assess patient's ability to carry out ADLs; assess patient's baseline for ADL function and identify physical deficits which impact ability to perform ADLs (bathing, care of mouth/teeth, toileting, grooming, dressing, etc )  - Assess/evaluate cause of self-care deficits   - Assess range of motion  - Assess patient's mobility; develop plan if impaired  - Assess patient's need for assistive devices and provide as appropriate  - Encourage maximum independence but intervene and supervise when necessary  - Involve family in performance of ADLs  - Assess for home care needs following discharge   - Consider OT consult to assist with ADL evaluation and planning for discharge  - Provide patient education as appropriate  Outcome: Progressing  Goal: Maintains/Returns to pre admission functional level  Description: INTERVENTIONS:  - Perform BMAT or MOVE assessment daily    - Set and communicate daily mobility goal to care team and patient/family/caregiver     - Collaborate with rehabilitation services on mobility goals if consulted  - Record patient progress and toleration of activity level   Outcome: Progressing  Goal: Patient will remain free of falls  Description: INTERVENTIONS:  - Educate patient/family on patient safety including physical limitations  - Instruct patient to call for assistance with activity   - Consult OT/PT to assist with strengthening/mobility   - Keep Call bell within reach  - Keep bed low and locked with side rails adjusted as appropriate  - Keep care items and personal belongings within reach  - Initiate and maintain comfort rounds  - Make Fall Risk Sign visible to staff  - Offer Toileting every 2 Hours, in advance of need  - Initiate/Maintain bed alarm  - Obtain necessary fall risk management equipment  - Apply yellow socks and bracelet for high fall risk patients  - Consider moving patient to room near nurses station  Outcome: Progressing     Problem: DISCHARGE PLANNING  Goal: Discharge to home or other facility with appropriate resources  Description: INTERVENTIONS:  - Identify barriers to discharge w/patient and caregiver  - Arrange for needed discharge resources and transportation as appropriate  - Identify discharge learning needs (meds, wound care, etc )  - Arrange for interpretive services to assist at discharge as needed  - Refer to Case Management Department for coordinating discharge planning if the patient needs post-hospital services based on physician/advanced practitioner order or complex needs related to functional status, cognitive ability, or social support system  Outcome: Progressing     Problem: Knowledge Deficit  Goal: Patient/family/caregiver demonstrates understanding of disease process, treatment plan, medications, and discharge instructions  Description: Complete learning assessment and assess knowledge base    Interventions:  - Provide teaching at level of understanding  - Provide teaching via preferred learning methods  Outcome: Progressing     Problem: SAFETY,RESTRAINT: NV/NON-SELF DESTRUCTIVE BEHAVIOR  Goal: Remains free of harm/injury (restraint for non violent/non self-detsructive behavior)  Description: INTERVENTIONS:  - Instruct patient/family regarding restraint use   - Assess and monitor physiologic and psychological status   - Provide interventions and comfort measures to meet assessed patient needs   - Identify and implement measures to help patient regain control  - Assess readiness for release of restraint   Outcome: Progressing  Goal: Returns to optimal restraint-free functioning  Description: INTERVENTIONS:  - Assess the patient's behavior and symptoms that indicate continued need for restraint  - Identify and implement measures to help patient regain control  - Assess readiness for release of restraint   Outcome: Progressing     Problem: Potential for Falls  Goal: Patient will remain free of falls  Description: INTERVENTIONS:  - Educate patient/family on patient safety including physical limitations  - Instruct patient to call for assistance with activity   - Consult OT/PT to assist with strengthening/mobility   - Keep Call bell within reach  - Keep bed low and locked with side rails adjusted as appropriate  - Keep care items and personal belongings within reach  - Initiate and maintain comfort rounds  - Make Fall Risk Sign visible to staff  - Apply yellow socks and bracelet for high fall risk patients  - Consider moving patient to room near nurses station  Outcome: Progressing

## 2022-03-01 NOTE — ASSESSMENT & PLAN NOTE
Bilateral laminae and spinous process fractures of C5 and C6   · S/p mechanical trip and fall with hyperextension injury into washing machine  · Large anterior cervical spine prevertebral hematoma with active extravasation s/p angiogram   · Intubated for airway protection in setting of large hematoma  · Current exam no-focal  In aspen collar  Denies neck pain or back pain  Moving all extremities  Denies numbness or weakness  Imaging:  · Cerebral angiogram 2/26/2022: negative, no active identified hemorrhage  · MRI cervical spine wo, 2/26/2022: Slight widening of the anterior aspect of the C6-7 disc space with rupture of the anterior longitudinal ligament  The posterior longitudinal ligament and ligamentum flavum appear intact  Patient has known posterior element fractures at C5 and C6 incompletely evaluated on this examination  There is mild interspinous ligament injury in the posterior soft tissues extending from C2 through C5  Large prevertebral space hematoma anterior to the cervical spine as described above  · CT cervical spine wo, 2/26/2022: Extensive multilevel degenerative disc disease as above  Nondisplaced fractures in the lamina bilaterally and spinous process at C5 and C6  Displaced ossific focus anterior to the C6-7 disc space suggesting avulsed displaced anterior osteophyte  Large soft tissue abnormality anterior to the C6 and C7 likely represent prevertebral hematoma  Plan:  · Continue to monitor neuro exam closely  · Transfuse as needed for active extravasation - Hgb 9 2, s/p 3 units PRBC  · Noted 2/28 with small retroperitoneal bleed, no intervention  · Maintain in cervical brace at all times  · Hold PT/OT secondary to instability  · Wean to extubate as able  · Maintain MAP > 85 mmHg  · Anticipate surgical intervention (C3/C4-T1 posterior decompression/fusion) once hemodynamically stable, TBD  · DVT ppx: SCDs  Neurosurgery will continue to follow closely   Please call with any questions or concerns

## 2022-03-01 NOTE — NUTRITION
03/01/22 1136   Recommendations/Interventions   Nutrition Recommendations Other (Specify); Lab consider order (Specify); Tube feeding recs provided  (If unable to safely advance diet within 72hrs, initiate continuous EN of Jevity 1 2 kcal @ 20 ml/hr & advance to a goal rate of 65 ml/hr w/ 2 packets Prosource TF per day (1952 kcal, 108 gms pro, 1259 ml tv, CHO load 2 5 mg/kg/min)   Monitor electrolytes )

## 2022-03-01 NOTE — PROGRESS NOTES
[facility]  Progress Note - Hayden Mcdermott 1945, 64 y o  female MRN: [id number]  Unit/Bed#: ICU 09 Encounter: [id number]  Primary Care Provider: Penelope Sifuentes MD   Date and time admitted to hospital: 2/26/2022  5:25 PM    Hematoma of neck  Assessment & Plan  See above  Angiogram was negative  * Cervical spine fracture (HCC)  Assessment & Plan  Bilateral laminae and spinous process fractures of C5 and C6   · S/p mechanical trip and fall with hyperextension injury into washing machine  · Large anterior cervical spine prevertebral hematoma with active extravasation s/p angiogram   · Intubated for airway protection in setting of large hematoma  · Current exam no-focal  In aspen collar  Denies neck pain or back pain  Moving all extremities  Denies numbness or weakness  Imaging:  · Cerebral angiogram 2/26/2022: negative, no active identified hemorrhage  · MRI cervical spine wo, 2/26/2022: Slight widening of the anterior aspect of the C6-7 disc space with rupture of the anterior longitudinal ligament  The posterior longitudinal ligament and ligamentum flavum appear intact  Patient has known posterior element fractures at C5 and C6 incompletely evaluated on this examination  There is mild interspinous ligament injury in the posterior soft tissues extending from C2 through C5  Large prevertebral space hematoma anterior to the cervical spine as described above  · CT cervical spine wo, 2/26/2022: Extensive multilevel degenerative disc disease as above  Nondisplaced fractures in the lamina bilaterally and spinous process at C5 and C6  Displaced ossific focus anterior to the C6-7 disc space suggesting avulsed displaced anterior osteophyte  Large soft tissue abnormality anterior to the C6 and C7 likely represent prevertebral hematoma  Plan:  · Continue to monitor neuro exam closely    · Transfuse as needed for active extravasation - Hgb 9 2, s/p 3 units PRBC   · Noted 2/28 with small retroperitoneal bleed, no intervention  · Maintain in cervical brace at all times  · Hold PT/OT secondary to instability  · Wean to extubate as able  · Maintain MAP > 85 mmHg  · Anticipate surgical intervention (C3/C4-T1 posterior decompression/fusion) once hemodynamically stable, TBD  · DVT ppx: SCDs  Neurosurgery will continue to follow closely  Please call with any questions or concerns  Subjective/Objective   Chief Complaint: N/A    Subjective: Intubated and sedated  Objective: Moving all extremities to command  Nodding head yes and know  Opens eyes to command  I/O       02/27 0701 02/28 0700 02/28 0701 03/01 0700 03/01 0701 03/02 0700    I V  (mL/kg) 3046 (41 7) 3298 9 (45 2) 282 4 (3 9)    Blood  860     NG/GT   30    IV Piggyback 1000      Total Intake(mL/kg) 4046 (55 4) 4158 9 (57) 312 4 (4 3)    Urine (mL/kg/hr) 810 (0 5) 1570 (0 9) 200 (1 1)    Emesis/NG output 100 50     Total Output 910 1620 200    Net +3136 +2538 9 +112 4                 Invasive Devices  Report    Peripheral Intravenous Line            Peripheral IV Right Forearm -- days    Peripheral IV 02/26/22 Proximal;Right;Ventral (anterior) Forearm 2 days    Peripheral IV 02/27/22 Left;Proximal;Ventral (anterior) Antecubital 2 days          Arterial Line            Arterial Line 02/26/22 Left Radial 2 days          Drain            NG/OG/Enteral Tube Orogastric 16 Fr Left mouth 2 days    Urethral Catheter Temperature probe 16 Fr  2 days          Airway            ETT  Cuffed 6 mm -- days                Physical Exam:  Vitals: Blood pressure (!) 200/90, pulse 60, temperature 98 2 °F (36 8 °C), temperature source Oral, resp  rate (!) 11, height 5' 5" (1 651 m), weight 73 kg (160 lb 15 oz), SpO2 100 %  ,Body mass index is 26 78 kg/m²      Hemodynamic Monitoring: MAP: Arterial Line MAP (mmHg): 134 mmHg    General appearance: intubated, alert  Head: Mid forehead hematoma and OS ecchymosis  Eyes: EOMI, PERRL  Neck: cervical brace  Back: no kyphosis present, no tenderness to percussion or palpation  Lungs: mechanically ventilated AC/VC  Heart: regular heart rate  Neurologic:   Mental status: GCS 11T (E4, V1T, M6)  Cranial nerves: grossly intact (Cranial nerves II-XII)  Sensory: normal to LT  Motor: moving all extremities without focal weakness  Reflexes: 2+ and symmetric      Lab Results:  Results from last 7 days   Lab Units 03/01/22 0517 02/28/22  2354 02/28/22  1801 02/28/22  1304 02/28/22  0518 02/27/22  1147 02/27/22  0450 02/26/22  1531 02/26/22  1531   WBC Thousand/uL 8 70  --   --   --  9 91  --  9 64   < > 6 67   HEMOGLOBIN g/dL 9 2* 9 4* 9 5*   < > 6 5*   < > 6 8*   < > 9 2*   HEMATOCRIT % 26 4* 26 1* 27 5*   < > 20 0*   < > 21 2*   < > 29 5*   PLATELETS Thousands/uL 105*  --   --   --  107*  --  140*   < > 173   NEUTROS PCT % 87*  --   --   --   --   --  86*  --  65   MONOS PCT % 5  --   --   --   --   --  3*  --  6    < > = values in this interval not displayed  Results from last 7 days   Lab Units 03/01/22 0517 02/28/22 2354 02/28/22  1801 02/27/22  0450 02/26/22  1531   POTASSIUM mmol/L 4 0 4 1 4 1   < > 3 4*   CHLORIDE mmol/L 109* 110* 109*   < > 106   CO2 mmol/L 25 24 23   < > 27   BUN mg/dL 52* 54* 51*   < > 31*   CREATININE mg/dL 1 14 1 33* 1 35*   < > 1 00   CALCIUM mg/dL 8 4 8 4 8 4   < > 8 6   ALK PHOS U/L  --   --   --   --  98   ALT U/L  --   --   --   --  63   AST U/L  --   --   --   --  32    < > = values in this interval not displayed       Results from last 7 days   Lab Units 02/28/22  0518 02/27/22  0450   MAGNESIUM mg/dL 2 0 1 6     Results from last 7 days   Lab Units 02/28/22  0518 02/27/22  0450   PHOSPHORUS mg/dL 5 9* 6 1*     Results from last 7 days   Lab Units 02/26/22  1545   INR  1 12   PTT seconds 28     No results found for: TROPONINT  ABG:  Lab Results   Component Value Date    PHART 7 405 02/27/2022    [id number] 37 2 02/27/2022    PO2ART 139 8 (H) 02/27/2022 [id number] 22 8 02/27/2022    BEART -1 7 02/27/2022    SOURCE Line, Arterial 02/27/2022       Imaging Studies: I have personally reviewed pertinent reports  and I have personally reviewed pertinent films in PACS    CTA head and neck w wo contrast    Result Date: 2/28/2022  Impression: No acute intracranial abnormality  Persistent large prevertebral soft tissue hematoma with extension inferiorly into posterior mediastinum  No active contrast extravasation identified, resolved since most recent CTA neck  Negative CTA head and neck for large vessel occlusion, dissection,  aneurysm, or high-grade stenosis  Unchanged acute small anteriorly displaced avulsed fracture fragment with widened anterior disc space at C6-C7 level, compatible with known anterior longitudinal ligament rupture  Unchanged acute nondisplaced fractures of bilateral lamina and spinous process at C5 and C6  Unchanged small subgaleal hematoma in bilateral frontal regions  Please see same-day CT chest abdomen pelvis with contrast for further evaluation  The study was marked in [facility] for immediate notification  Workstation performed: [id number]     XR chest portable    Result Date: 2/27/2022  Impression: Endotracheal tube as above  Widening of the right paratracheal stripe indicative of underlying adenopathy or mass  Right lower lobe subsegmental atelectasis  Workstation performed: [id number]     CT head without contrast    Result Date: 2/26/2022  Impression: No acute intracranial abnormality  Left nasal bone depressed comminuted fractures  Soft tissue swelling over the left infraorbital, nasal and supraorbital regions  The study was marked in [facility] for immediate notification  Workstation performed: [id number]     CTA neck w wo contrast    Result Date: 2/26/2022  Impression: There is an enlarging, large prevertebral hematoma within the cervical spine tracking inferiorly into the upper mediastinum    This is displacing the trachea anteriorly and resulting in moderate tracheal narrowing at the level of the tip of the endotracheal tube  There is brisk active arterial extravasation of contrast noted within the prevertebral hematoma immediately anterior to the C6-7 disc space, corresponding to a site of active bleeding  There is now widening of the disc space of C6-7 anteriorly, not present on the initial CT scan performed approximately 4 hours ago consistent with anterior longitudinal ligament disruption  Small avulsion fracture suspected  Stable posterior element fractures involving C5 and C6  No subluxation  I personally discussed this study with the trauma fellow on 2/26/2022 at 7:20 PM  Workstation performed: [id number]     CT cervical spine without contrast    Result Date: 2/26/2022  Impression: Extensive multilevel degenerative disc disease as above  Nondisplaced fractures in the lamina bilaterally and spinous process at C5 and C6  Displaced ossific focus anterior to the C6-7 disc space suggesting avulsed displaced anterior osteophyte  Large soft tissue abnormality anterior to the C6 and C7 likely represent prevertebral hematoma  I personally discussed this study with Shayna Adams on 2/26/2022 at 3:21 PM   Workstation performed: [id number]     MRI cervical spine wo contrast    Result Date: 2/27/2022  Impression: Slight widening of the anterior aspect of the C6-7 disc space with rupture of the anterior longitudinal ligament  The posterior longitudinal ligament and ligamentum flavum appear intact  Patient has known posterior element fractures at C5 and C6 incompletely evaluated on this examination  There is mild interspinous ligament injury in the posterior soft tissues extending from C2 through C5  Large prevertebral space hematoma anterior to the cervical spine as described above  No cord compression or abnormal cord signal identified   Workstation performed: [id number]     CT chest abdomen pelvis w contrast    Result Date: 2/28/2022  Impression: Chest: 1  Decreased size of a prevertebral and mediastinal hematoma extending outside of the field of view into the neck  See concurrent CTA head/neck for further evaluation  There is diminished mass effect on the trachea  2   Small bilateral subacute hemothoraces with bibasilar atelectasis  3   Spiculated left breast mass measuring up to 2 9 cm  Recommend correlation with mammography  Abdomen/pelvis: 1  Acute right retroperitoneal hematoma expanding the right iliopsoas and iliacus musculature  Small amount of subacute hemoperitoneum in the right paracolic gutter and pelvis 2  Cholelithiasis with edematous wall thickening and pericholecystic fluid which could represent acute cholecystitis in the appropriate clinical scenario  Intra-axial hepatic biliary duct dilatation without evidence of obstructing stone  I personally discussed this study with Lisha Head on 2/28/2022 at 4:24 PM  Workstation performed: [id number]     CT recon (no charge)    Result Date: 2/26/2022  Impression: Acute mildly displaced left nasal bone fractures  Otherwise no facial fracture identified  Hematoma anterior to the frontal bone in the midline tracks inferiorly into the preseptal soft tissues, left greater than right and into the left cheek  Workstation performed: [id number]       EKG, Pathology, and Other Studies: I have personally reviewed pertinent reports        VTE Pharmacologic Prophylaxis: Sequential compression device (Venodyne)     VTE Mechanical Prophylaxis: sequential compression device

## 2022-03-01 NOTE — PROGRESS NOTES
Daily Progress Note - [address] 64 y o  female MRN: [id number]  Unit/Bed#: ICU 09 Encounter: [id number]        ----------------------------------------------------------------------------------------  HPI/24hr events: 64 F with Hx HTN presented following fall with acute neck injury  CT scan showing C5-6 level laminar fractures and neck hematoma, clinically with signs of airway compromise  And stridor but without focal neurologic deficits on arrival to [facility] 2/26  Patient was intubated for airway protection  Hb went down to 6 5 yesterday morning, CT scan was obtained and showed decreased size of prevertebral hematoma but showed acute right retroperitoneal hematoma  Hb improved after 2 units of pRBC  No acute event overnight, hemodynamically stable, Hb has been stable around 9 4-9 5    ---------------------------------------------------------------------------------------  SUBJECTIVE  She is sedated and intubated     Review of Systems   Unable to perform ROS: Intubated     Review of systems was reviewed and negative unless stated above in HPI/24-hour events   ---------------------------------------------------------------------------------------  Assessment and Plan:    Neuro:   · Diagnosis: Sedation and analgesia   ? Plan: Continue Propofol Gtt, Goal RASS -2  ? Continue fentanyl 75mcg/hr infusion   ? Sedation breaks, neuro checks Q2h  ? Fenatyl 50mcg Q1h PRN for acute breakthrough pain      · Diagnosis: Acute cervical fractures and ligamentous injury   ? Plan: MRI obtained, neurosurgical planning per consultant service  ? Continue cervical brace, cervical precautions   ? Continue Q2h neuro checks   ? Repeat Ct for worsening Neurologic examination or development of focal deficits         CV:   · Diagnosis: Hx of Hypertension   ? Plan: Home antihypertensive regimen held  ? PRN labetalol 10mg Q6h for SBP >160  ?  Continue hemodynamic monitoring            Pulm:  · Diagnosis: Acute Airway compromise due to Prevertebral hematoma   ? Plan: Maintain airway, continue mechanical ventilation   ? AC/VC 14/400/50%/6  ? Continue adult ventilator management protocol, oral hygiene, sedation mgmt        GI:   · Diagnosis: GI PPx   ? Plan: Continue IV pepcid        :   · Diagnosis: MILVIA  ? Plan: Maintain mayers  ? Cre trending down 1 33 from 1 74  ? Strict I/O recording           F/E/N:   · Plan:   · NPO/NGT  · Isolyte 125cc/hr  · Replace electrolytes PRN, Goal K>4, P>3, Mg>2        Heme/Onc:   · Diagnosis: ABLA in setting of acute Prevertebral hematoma (no active extravasation on 2/26 4 vessel angiography)   · Transfused 1U p RBC 2/27, 1U p RBC 2/28  ? Plan: Trend Q6h CBC, monitor Hemoglobin  ? Transfuse to maintain Hb >7 0  ? No pharmacologic DVT PPx  ? Continue mechanical DVT PPx     ? Diagnosis: acute right retroperitoneal hematoma  ? Plan: 2units of pRBC given 2/28  ? Trend Q6h CBC, monitor Hemoglobin  ? Transfuse to maintain Hb >7 0  ? No pharmacologic DVT PPx  ? Continue mechanical DVT PPx         Endo:   · Diagnosis: No acute issues  ? Plan: POCT Q6h while NPO  ? Avoid hypoglycemia            ID:   · Diagnosis: No active issues  ? Plan: Monitor for signs and symptoms of infection  ? Trend temperature curve, WBC        MSK/Skin:   · Diagnosis: C5-C6 posterior laminar Fractures, C6-7 displaced osteophyte, Prevertebral hematoma   ? Plan: Continue mechanical ventilation for airway protection  ? Maintain C-collar  ? Cervical spine precautions  ? Surgical planning per NSGY  ?    · Diagnosis: b/l comminuted nasal bone fractures  ? Plan: No acute surgical intervention     · Diagnosis: superficial abrasions/lacerations  ?  Plan: continue local wound care     Patient appropriate for transfer out of the ICU today?: No  Disposition: Continue Critical Care   Code Status: Level 1 - Full Code  ---------------------------------------------------------------------------------------  ICU CORE MEASURES    Prophylaxis   VTE Pharmacologic Prophylaxis: Pharmacologic VTE Prophylaxis contraindicated due to hemorrhage  VTE Mechanical Prophylaxis: sequential compression device  Stress Ulcer Prophylaxis: Famotidine IV     ABCDE Protocol (if indicated)  Plan to perform spontaneous awakening trial today? No Not applicable  Plan to perform spontaneous breathing trial today? No  Obvious barriers to extubation? No  CAM-ICU: Negative    Invasive Devices Review  Invasive Devices  Report    Peripheral Intravenous Line            Peripheral IV Right Forearm -- days    Peripheral IV 22 Proximal;Right;Ventral (anterior) Forearm 2 days    Peripheral IV 22 Left;Proximal;Ventral (anterior) Antecubital 2 days          Arterial Line            Arterial Line 22 Left Radial 2 days          Drain            NG/OG/Enteral Tube Orogastric 16 Fr Left mouth 2 days    Urethral Catheter Temperature probe 16 Fr  2 days          Airway            ETT  Cuffed 6 mm -- days              Can any invasive devices be discontinued today? No  ---------------------------------------------------------------------------------------  OBJECTIVE    Vitals   Vitals:    22 1930 220   BP:  163/79 152/70 151/73   BP Location:  Left arm     Pulse:  68 68 68   Resp:  13 (!) 11 14   Temp:  97 9 °F (36 6 °C)     TempSrc:  Oral     SpO2: 99% 99% 98% 98%   Weight:       Height:         Temp (24hrs), Av 9 °F (36 6 °C), Min:97 4 °F (36 3 °C), Max:98 4 °F (36 9 °C)  Current: Temperature: 97 9 °F (36 6 °C)  HR: 68  BP: 151/73  RR: 14  SpO2: 99    Respiratory:  SpO2: SpO2: 98 %  Nasal Cannula O2 Flow Rate (L/min): 2 L/min    Invasive/non-invasive ventilation settings   Respiratory  Report   Lab Data (Last 4 hours)    None         O2/Vent Data (Last 4 hours)    None                Physical Exam  Vitals and nursing note reviewed     Constitutional:       Comments: sedated   HENT:      Head:      Comments: Abrasion on left face Right Ear: External ear normal       Left Ear: External ear normal       Mouth/Throat:      Mouth: Mucous membranes are moist       Pharynx: Oropharynx is clear  Eyes:      Conjunctiva/sclera: Conjunctivae normal    Neck:      Comments: Neck collar in place  Cardiovascular:      Rate and Rhythm: Normal rate and regular rhythm  Pulses: Normal pulses  Heart sounds: Normal heart sounds  Pulmonary:      Effort: Pulmonary effort is normal       Breath sounds: Normal breath sounds  Abdominal:      General: Abdomen is flat  Bowel sounds are normal       Palpations: Abdomen is soft  Skin:     General: Skin is warm and dry  Capillary Refill: Capillary refill takes less than 2 seconds  Neurological:      Comments: sedated   Psychiatric:      Comments: sedated             Laboratory and Diagnostics:  Results from last 7 days   Lab Units 02/28/22  2354 02/28/22  1801 02/28/22  1304 02/28/22  0518 02/27/22  2359 02/27/22  1844 02/27/22  1147 02/27/22  0450 02/27/22  0450 02/26/22  1531 02/26/22  1531   WBC Thousand/uL  --   --   --  9 91  --   --   --   --  9 64  --  6 67   HEMOGLOBIN g/dL 9 4* 9 5* 6 7* 6 5* 6 7* 7 0* 7 7*   < > 6 8*   < > 9 2*   HEMATOCRIT % 26 1* 27 5* 20 2* 20 0* 21 0* 22 0* 24 1*   < > 21 2*   < > 29 5*   PLATELETS Thousands/uL  --   --   --  107*  --   --   --   --  140*  --  173   NEUTROS PCT %  --   --   --   --   --   --   --   --  86*  --  65   MONOS PCT %  --   --   --   --   --   --   --   --  3*  --  6    < > = values in this interval not displayed       Results from last 7 days   Lab Units 02/28/22  2354 02/28/22  1801 02/28/22  0518 02/27/22  0450 02/26/22  1531   SODIUM mmol/L 138 137 136 137 138   POTASSIUM mmol/L 4 1 4 1 4 4 3 4* 3 4*   CHLORIDE mmol/L 110* 109* 111* 110* 106   CO2 mmol/L 24 23 22 24 27   ANION GAP mmol/L 4 5 3* 3* 5   BUN mg/dL 54* 51* 54* 29* 31*   CREATININE mg/dL 1 33* 1 35* 1 74* 1 04 1 00   CALCIUM mg/dL 8 4 8 4 8 6 8 8 8 6   GLUCOSE RANDOM mg/dL 127 135 140 163* 104   ALT U/L  --   --   --   --  63   AST U/L  --   --   --   --  32   ALK PHOS U/L  --   --   --   --  98   ALBUMIN g/dL  --   --   --   --  2 2*   TOTAL BILIRUBIN mg/dL  --   --   --   --  0 43     Results from last 7 days   Lab Units 02/28/22  0518 02/27/22  0450   MAGNESIUM mg/dL 2 0 1 6   PHOSPHORUS mg/dL 5 9* 6 1*      Results from last 7 days   Lab Units 02/26/22  1545   INR  1 12   PTT seconds 28              ABG:  Results from last 7 days   Lab Units 02/27/22  0748   PH ART  7 405   PCO2 ART mm Hg 37 2   PO2 ART mm Hg 139 8*   HCO3 ART mmol/L 22 8   BASE EXC ART mmol/L -1 7   ABG SOURCE  Line, Arterial     VBG:  Results from last 7 days   Lab Units 02/27/22  0748   ABG SOURCE  Line, Arterial           Micro        EKG: NSR      Intake and Output  I/O       02/27 0701  02/28 0700 02/28 0701  03/01 0700    I V  (mL/kg) 3046 (41 7) 1896 (26)    Blood  860    IV Piggyback 1000     Total Intake(mL/kg) 4046 (55 4) 2756 (37 8)    Urine (mL/kg/hr) 810 (0 5) 935 (0 5)    Emesis/NG output 100 50    Total Output 910 985    Net +3136 +1771              UOP:  ml/hr     Height and Weights   Height: 5' 5" (165 1 cm)     Body mass index is 26 78 kg/m²  Weight (last 2 days)     Date/Time Weight    02/27/22 0007 73 (160 94)            Nutrition       Diet Orders   (From admission, onward)             Start     Ordered    02/26/22 1942  Diet NPO  Diet effective now        References:    Nutrtion Support Algorithm Enteral vs  Parenteral   Question Answer Comment   Diet Type NPO    RD to adjust diet per protocol?  No        02/26/22 1949                Active Medications  Scheduled Meds:  Current Facility-Administered Medications   Medication Dose Route Frequency Provider Last Rate    chlorhexidine  15 mL Mouth/Throat Q12H [address] Charis Wang MD      dexamethasone  8 mg Intravenous [facility] Aviva Haylee, DO      fentaNYL  75 mcg/hr Intravenous Continuous Roslyn Deshpande MD 75 mcg/hr (02/28/22 2306)    fentanyl citrate (PF)  50 mcg Intravenous Q1H PRN Fritz Hoff      hydrALAZINE  10 mg Intravenous Q6H PRN ETIENNE Dodd      insulin lispro  1-5 Units Subcutaneous Q6H [facility] Armaan Beth PA-C      metoclopramide  10 mg Intravenous Once PRN Will Alvarez CRNA      multi-electrolyte  125 mL/hr Intravenous Continuous Isrrael Lam  mL/hr (02/28/22 1501)    niCARdipine  1-15 mg/hr Intravenous Titrated ETIENNE Dodd Stopped (02/28/22 1855)    propofol  5-50 mcg/kg/min Intravenous Titrated Renny Proctor MD 20 mcg/kg/min (03/01/22 0007)     Continuous Infusions:  fentaNYL, 75 mcg/hr, Last Rate: 75 mcg/hr (02/28/22 2306)  multi-electrolyte, 125 mL/hr, Last Rate: 125 mL/hr (02/28/22 1501)  niCARdipine, 1-15 mg/hr, Last Rate: Stopped (02/28/22 1855)  propofol, 5-50 mcg/kg/min, Last Rate: 20 mcg/kg/min (03/01/22 0007)      PRN Meds:   fentanyl citrate (PF), 50 mcg, Q1H PRN  hydrALAZINE, 10 mg, Q6H PRN  metoclopramide, 10 mg, Once PRN        Allergies   No Known Allergies  ---------------------------------------------------------------------------------------  Advance Directive and Living Will:      Power of :    POLST:    ---------------------------------------------------------------------------------------  Care Time Delivered:   Upon my evaluation, this patient had a high probability of imminent or life-threatening deterioration due to respiratory failure, which required my direct attention, intervention, and personal management  I have personally provided 30 minutes (0230 to 0300) of critical care time, exclusive of procedures, teaching, family meetings, and any prior time recorded by providers other than myself  Liam Cortes MD      Portions of the record may have been created with voice recognition software  Occasional wrong word or "sound a like" substitutions may have occurred due to the inherent limitations of voice recognition software    Read the chart carefully and recognize, using context, where substitutions have occurred

## 2022-03-01 NOTE — RESTORATIVE TECHNICIAN NOTE
Restorative Technician Note      Patient Name: Fausto Michel     Note Type: Bracing, Initial consult  Patient Position Upon Consult: Supine  Brace Applied: [place] Collar Set (LVL3)  Additional Brace Ordered: No  Patient Position When Brace Applied: Supine  Bracing Recommendations: None  Nurse Communication: Nurse aware of consult, application of brace    Please call Mobility Coordinator at ext  [phone] in regards to bracing instruction and/or adjustment    Milus Means Restorative Technician, BS

## 2022-03-01 NOTE — CASE MANAGEMENT
Case Management Progress Note    Patient name Cam Boyd  Location ICU 09/ICU 09 MRN [id number]  : 1945 Date 3/1/2022       LOS (days): 3  Geometric Mean LOS (GMLOS) (days): 4 30  Days to GMLOS:1 6        OBJECTIVE:        Current admission status: Inpatient  Preferred Pharmacy:   [facility]/pharmacy #[id number]- [place], [place] - [facility] [address] [address]  Phone: [phone] Fax: [phone]    Primary Care Provider: Micah Batista MD    Primary Insurance: [address] REP  Secondary Insurance:     PROGRESS NOTE:      Pt remains intubated and sedated  CM will continue to follow for potential d/c recs and needs

## 2022-03-01 NOTE — PHYSICAL THERAPY NOTE
Physical Therapy Cancellation Note    PT orders received chart review completed  Pt is currently intubated/sedated and not appropriate to participate in skilled PT at this time  PT will follow and eval as medically appropriate  03/01/22 1300   PT Last Visit   PT Visit Date 03/01/22   Note Type   Note type Evaluation; Cancelled Session   Cancel Reasons Intubated/sedated     Brionna Kan, PT

## 2022-03-01 NOTE — OCCUPATIONAL THERAPY NOTE
OT CANCEL NOTE    OT orders received  Chart reviewed  Pt is currently intubated/sedated and not appropriate to engage in skilled OT services at this time  Will hold initial OT evaluation  Will continue to follow pt on caseload and see pt when medically stable and as clinically appropriate  03/01/22 0801   OT Last Visit   OT Visit Date 03/01/22   Note Type   Note type Evaluation; Cancelled Session   Cancel Reasons Intubated/sedated     Othiginio Awan MS, OTR/L

## 2022-03-01 NOTE — QUICK NOTE
Patient's Son and Daughter in law bedside  I updated them with the patient status and tentative plan for surgery on Friday  I answered their questions  They did ask about details of upcoming surgical procedure, but I deferred these questions to the Neurosurgeon  SAAD acuña texted Jocelyn Roth with [employer] to keep her in the loop that patient had questions

## 2022-03-01 NOTE — RESPIRATORY THERAPY NOTE
RT Ventilator Management Note  Eladio Herrera 64 y o  female MRN: [id number]  Unit/Bed#: ICU 09 Encounter: [id number]      Daily Screen       2/28/2022  0731 3/1/2022  0819          Patient safety screen outcome[de-identified] Failed Failed      Not Ready for Weaning due to[de-identified] Underline problem not resolved --              Physical Exam:   Assessment Type: Assess only  General Appearance: Sedated  Respiratory Pattern: Assisted  Chest Assessment: Chest expansion symmetrical  Bilateral Breath Sounds: Diminished,Clear      Resp Comments: Pt responds to name being called  Breathing over vent when awake  SBT not initiated due to no order placed for this   Will discuss with MD during rounds this AM

## 2022-03-02 PROBLEM — D62 ACUTE BLOOD LOSS ANEMIA: Status: ACTIVE | Noted: 2022-01-01

## 2022-03-02 PROBLEM — S02.2XXA NASAL BONE FRACTURES: Status: ACTIVE | Noted: 2022-01-01

## 2022-03-02 PROBLEM — J96.00 ACUTE RESPIRATORY FAILURE (HCC): Status: ACTIVE | Noted: 2022-01-01

## 2022-03-02 PROBLEM — N17.9 AKI (ACUTE KIDNEY INJURY) (HCC): Status: ACTIVE | Noted: 2022-01-01

## 2022-03-02 NOTE — CASE MANAGEMENT
Case Management Discharge Planning Note    Patient name Nusrat Dao  Location ICU 09/ICU 09 MRN [id number]  : 1945 Date 3/2/2022       Current Admission Date: 2022  Current Admission Diagnosis:Cervical spine fracture [facility])   Patient Active Problem List    Diagnosis Date Noted    [facility]    Acute blood loss anemia 2022    MILVIA (acute kidney injury) ([facility] ) 2022    Nasal bone fractures 2022    Cervical spine fracture ([facility] ) 2022    Hematoma of neck 2022    BMI 29 0-29 9,adult 2020    Medicare annual wellness visit, initial 2019    Essential hypertension 2016      LOS (days): 4  Geometric Mean LOS (GMLOS) (days): 4 30  Days to GMLOS:0 6     OBJECTIVE:  Risk of Unplanned Readmission Score: 13         Current admission status: Inpatient   Preferred Pharmacy:   [facility]/pharmacy #[id number]- [place], [place] - [facility] [address] [address]  Phone: [phone] Fax: [phone]    Primary Care Provider: Monalisa Villavicencio MD    Primary Insurance: [address] [place] [address]  Secondary Insurance:     DISCHARGE DETAILS:       Pt remains intubated  Plan is for Nsg intervention, possibly later this week   CM will continue to follow for eventual recommendations from therapy and medical team

## 2022-03-02 NOTE — PROGRESS NOTES
The patient is now transported to the CT Scan department to have a CT Scan of the Chest done to rule out a PE

## 2022-03-02 NOTE — PHYSICAL THERAPY NOTE
PT orders received  Chart reviewed  Pt currently intubated/sedated and not appropriate for PT at this time  Will D/C from PT   Please re-consult if medically appropriate  Elvis Morfin, PT, DPT       03/02/22 1527   PT Last Visit   PT Visit Date 03/02/22   Note Type   Note type Cancelled Session   Cancel Reasons Intubated/sedated

## 2022-03-02 NOTE — SEPSIS NOTE
Sepsis Note   Cam Boyd 64 y o  female MRN: [id number]  Unit/Bed#: ICU 09 Encounter: [id number]       qSOFA     Row Name 03/02/22 1230 03/02/22 1205 03/02/22 1200 03/02/22 1150 03/02/22 1140    Altered mental status GCS < 15 -- -- -- -- --    Respiratory Rate > / =22 1 0 1 1 1    Systolic BP < / =372 0 0 0 0 0    Q Sofa Score 1 0 1 1 1    Row Name 03/02/22 1100 03/02/22 1000 03/02/22 0933 03/02/22 0900 03/02/22 0800    Altered mental status GCS < 15 -- -- -- -- --    Respiratory Rate > / =22 0 0 1 0 0    Systolic BP < / =594 0 0 0 0 0    Q Sofa Score 0 1 2 1 1    Row Name 03/02/22 0600 03/02/22 0500 03/02/22 0400 03/02/22 0300 03/02/22 0200    Altered mental status GCS < 15 1 -- 1 1 1    Respiratory Rate > / =22 0 0 0 0 0    Systolic BP < / =638 0 0 0 0 0    Q Sofa Score 1 1 1 1 1    Row Name 03/02/22 0100 03/02/22 0000 03/01/22 2300 03/01/22 2200 03/01/22 2100    Altered mental status GCS < 15 -- 1 -- 1 --    Respiratory Rate > / =22 0 0 0 0 0    Systolic BP < / =797 0 0 1 0 0    Q Sofa Score 1 1 1 1 1    Row Name 03/01/22 2000 03/01/22 1900 03/01/22 1800 03/01/22 1758 03/01/22 1611    Altered mental status GCS < 15 1 -- 1 -- --    Respiratory Rate > / =22 0 0 -- 0 --    Systolic BP < / =723 0 0 -- 0 0    Q Sofa Score 1 1 1 1 0    Row Name 03/01/22 1600 03/01/22 1400 03/01/22 1200 03/01/22 1000 03/01/22 0833    Altered mental status GCS < 15 1 1 1 1 --    Respiratory Rate > / =22 0 0 0 0 --    Systolic BP < / =858 0 0 0 0 0    Q Sofa Score 1 1 1 1 1    Row Name 03/01/22 0800 03/01/22 0600 03/01/22 0500 03/01/22 0400 03/01/22 0300    Altered mental status GCS < 15 1 1 -- 1 --    Respiratory Rate > / =22 0 0 0 0 0    Systolic BP < / =195 0 0 -- 0 --    Q Sofa Score 1 1 1 1 0    Row Name 03/01/22 0200 03/01/22 0100 03/01/22 0000 02/28/22 2300 02/28/22 2200    Altered mental status GCS < 15 1 -- 1 -- 1    Respiratory Rate > / =22 0 0 0 0 0    Systolic BP < / =309 0 0 0 0 0    Q Sofa Score 1 0 1 1 1    Row Name 02/28/22 2100 02/28/22 2000 02/28/22 1900 02/28/22 1800 02/28/22 1730    Altered mental status GCS < 15 -- 1 -- 1 --    Respiratory Rate > / =22 0 0 0 0 0    Systolic BP < / =206 0 0 0 0 0    Q Sofa Score 1 1 1 1 1    Row Name 02/28/22 1700 02/28/22 1600 02/28/22 1539 02/28/22 1500 02/28/22 1400    Altered mental status GCS < 15 -- 1 -- -- 1    Respiratory Rate > / =22 0 0 0 0 0    Systolic BP < / =810 0 -- -- 0 --    Q Sofa Score 1 1 1 1 --               Initial Sepsis Screening     Row Name 03/02/22 1332                Is the patient's history suggestive of a new or worsening infection? Yes (Proceed)  -AS        Suspected source of infection pneumonia  -AS        Are two or more of the following signs & symptoms of infection both present and new to the patient? Yes (Proceed)  -AS        Indicate SIRS criteria Hyperthemia > 38 3C (100 9F); Tachycardia > 90 bpm;Tachypnea > 20 resp per min  -AS        If the answer is yes to both questions, suspicion of sepsis is present --        If severe sepsis is present AND tissue hypoperfusion perists in the hour after fluid resuscitation or lactate > 4, the patient meets criteria for SEPTIC SHOCK --        Are any of the following organ dysfunction criteria present within 6 hours of suspected infection and SIRS criteria that are NOT considered to be chronic conditions?  No  -AS        Organ dysfunction --        Date of presentation of severe sepsis --        Time of presentation of severe sepsis --        Tissue hypoperfusion persists in the hour after crystalloid fluid administration, evidenced, by either: --        Was hypotension present within one hour of the conclusion of crystalloid fluid administration? --        Date of presentation of septic shock --        Time of presentation of septic shock --              User Key  (r) = Recorded By, (t) = Taken By, (c) = Cosigned By    [address] Name Provider Type    AS Chun Patel PA-C Physician Assistant

## 2022-03-02 NOTE — PROGRESS NOTES
[address]  Progress Note - Get Regi 1945, 64 y o  female MRN: [id number]  Unit/Bed#: ICU 09 Encounter: [id number]  Primary Care Provider: Dorian Velazquez MD   Date and time admitted to hospital: 2/26/2022  5:25 PM    Hematoma of neck  Assessment & Plan  See above  Angiogram was negative  * Cervical spine fracture (HCC)  Assessment & Plan  Bilateral laminae and spinous process fractures of C5 and C6   · S/p mechanical trip and fall with hyperextension injury into washing machine  · Large anterior cervical spine prevertebral hematoma with active extravasation s/p angiogram on presentation  · Intubated for airway protection in setting of large hematoma  · Current exam no-focal  Intubated but nodding yes/no  In vista collar  Denies neck pain or back pain  Moving all extremities  Denies numbness or weakness  Imaging:  · Cerebral angiogram 2/26/2022: negative, no active identified hemorrhage  · MRI cervical spine wo, 2/26/2022: Slight widening of the anterior aspect of the C6-7 disc space with rupture of the anterior longitudinal ligament  The posterior longitudinal ligament and ligamentum flavum appear intact  Patient has known posterior element fractures at C5 and C6 incompletely evaluated on this examination  There is mild interspinous ligament injury in the posterior soft tissues extending from C2 through C5  Large prevertebral space hematoma anterior to the cervical spine as described above  · CT cervical spine wo, 2/26/2022: Extensive multilevel degenerative disc disease as above  Nondisplaced fractures in the lamina bilaterally and spinous process at C5 and C6  Displaced ossific focus anterior to the C6-7 disc space suggesting avulsed displaced anterior osteophyte  Large soft tissue abnormality anterior to the C6 and C7 likely represent prevertebral hematoma  Plan:  · Continue to monitor neuro exam closely    · Transfuse as needed for active extravasation - Hgb 9 s/p 3 units PRBC  · Noted 2/28 with small retroperitoneal bleed, no intervention  · Maintain in cervical brace at all times  · Hold PT/OT secondary to instability  · Wean to extubate as able  · Maintain MAP > 85 mmHg  · Anticipate surgical intervention (C3/C4-T1 posterior decompression/fusion) once hemodynamically stable, TBD, potentially Friday 3/4 with Dr Hope Cottrell  Will phone family to discuss  · DVT ppx: SCDs, heparin SQ  Neurosurgery will continue to follow closely  Please call with any questions or concerns  Subjective/Objective   Chief Complaint: N/A    Subjective: Denies pain or discomfort (nodding yes/no to questions)  Objective: NAD  Cervical brace  Extensive periorbital and facial ecchymosis/hematoma  I/O       02/28 0701 03/01 0700 03/01 0701 03/02 0700 03/02 0701 03/03 0700    I V  (mL/kg) 3298 9 (45 2) 3130 3 (42 9) 628 7 (8 6)    Blood 860      NG/GT  120     IV Piggyback  250     Feedings  230 80    Total Intake(mL/kg) 4158 9 (57) 3730 3 (51 1) 708 7 (9 7)    Urine (mL/kg/hr) 1570 (0 9) 1700 (1) 110 (0 6)    Emesis/NG output 50      Total Output 1620 1700 110    Net +2538 9 +2030 3 +598 7                 Invasive Devices  Report    Peripheral Intravenous Line            Peripheral IV Right Forearm -- days    Peripheral IV 02/26/22 Proximal;Right;Ventral (anterior) Forearm 3 days    Peripheral IV 02/27/22 Left;Proximal;Ventral (anterior) Antecubital 3 days          Arterial Line            Arterial Line 02/26/22 Left Radial 3 days          Drain            NG/OG/Enteral Tube Orogastric 16 Fr Left mouth 3 days    Urethral Catheter Temperature probe 16 Fr  3 days          Airway            ETT  Cuffed 6 mm -- days                Physical Exam:  Vitals: Blood pressure 125/65, pulse 70, temperature 97 9 °F (36 6 °C), temperature source Oral, resp  rate 14, height 5' 5" (1 651 m), weight 73 kg (160 lb 15 oz), SpO2 97 %  ,Body mass index is 26 78 kg/m²  Hemodynamic Monitoring: MAP: Arterial Line MAP (mmHg): 86 mmHg    General appearance: intubated and sedated  Head: raccoon eyes, facial ecchymosis  Eyes: EOMI, PERRL  Neck: cervical vista brace  Back: no kyphosis present, no tenderness to percussion or palpation  Lungs: mechanically ventilated AC/VC  Heart: regular heart rate  Neurologic:   Mental status: GCS 10T (E3, V1T, M6)  Cranial nerves: grossly intact (Cranial nerves II-XII)  Sensory: normal to LT  Motor: moving all extremities without focal weakness, strength grossly 5/5  Reflexes: 2+ and symmetric, no Ryder's or clonus      Lab Results:  Results from last 7 days   Lab Units 03/02/22  0404 03/01/22  0517 02/28/22  2354 02/28/22  1304 02/28/22  0518 02/27/22  1147 02/27/22  0450   WBC Thousand/uL 9 29 8 70  --   --  9 91  --  9 64   HEMOGLOBIN g/dL 9 0* 9 2* 9 4*   < > 6 5*   < > 6 8*   HEMATOCRIT % 26 6* 26 4* 26 1*   < > 20 0*   < > 21 2*   PLATELETS Thousands/uL 125* 105*  --   --  107*  --  140*   NEUTROS PCT % 82* 87*  --   --   --   --  86*   MONOS PCT % 6 5  --   --   --   --  3*    < > = values in this interval not displayed  Results from last 7 days   Lab Units 03/02/22  0404 03/01/22  2258 03/01/22  0517 02/27/22  0450 02/26/22  1531   POTASSIUM mmol/L 3 9 3 9 4 0   < > 3 4*   CHLORIDE mmol/L 111* 111* 109*   < > 106   CO2 mmol/L 27 27 25   < > 27   BUN mg/dL 48* 48* 52*   < > 31*   CREATININE mg/dL 0 95 1 01 1 14   < > 1 00   CALCIUM mg/dL 8 5 8 5 8 4   < > 8 6   ALK PHOS U/L  --   --   --   --  98   ALT U/L  --   --   --   --  63   AST U/L  --   --   --   --  32    < > = values in this interval not displayed       Results from last 7 days   Lab Units 03/02/22  0404 03/01/22  2258 02/28/22  0518   MAGNESIUM mg/dL 2 3 2 4 2 0     Results from last 7 days   Lab Units 03/02/22  0404 03/01/22  2258 02/28/22  0518   PHOSPHORUS mg/dL 2 8 2 9 5 9*     Results from last 7 days   Lab Units 02/26/22  1545   INR  1 12   PTT seconds 28     No results found for: TROPONINT  ABG:  Lab Results   Component Value Date    PHART 7 405 02/27/2022    DCD6SJV 37 2 02/27/2022    PO2ART 139 8 (H) 02/27/2022    TVF8HPT 22 8 02/27/2022    BEART -1 7 02/27/2022    SOURCE Line, Arterial 02/27/2022       Imaging Studies: I have personally reviewed pertinent reports  and I have personally reviewed pertinent films in PACS    CTA head and neck w wo contrast    Result Date: 2/28/2022  Impression: No acute intracranial abnormality  Persistent large prevertebral soft tissue hematoma with extension inferiorly into posterior mediastinum  No active contrast extravasation identified, resolved since most recent CTA neck  Negative CTA head and neck for large vessel occlusion, dissection,  aneurysm, or high-grade stenosis  Unchanged acute small anteriorly displaced avulsed fracture fragment with widened anterior disc space at C6-C7 level, compatible with known anterior longitudinal ligament rupture  Unchanged acute nondisplaced fractures of bilateral lamina and spinous process at C5 and C6  Unchanged small subgaleal hematoma in bilateral frontal regions  Please see same-day CT chest abdomen pelvis with contrast for further evaluation  The study was marked in [facility] for immediate notification  Workstation performed: [id number]     XR chest portable    Result Date: 2/27/2022  Impression: Endotracheal tube as above  Widening of the right paratracheal stripe indicative of underlying adenopathy or mass  Right lower lobe subsegmental atelectasis  Workstation performed: [id number]     CT head without contrast    Result Date: 2/26/2022  Impression: No acute intracranial abnormality  Left nasal bone depressed comminuted fractures  Soft tissue swelling over the left infraorbital, nasal and supraorbital regions  The study was marked in [facility] for immediate notification   Workstation performed: [id number]     CTA neck w wo contrast    Result Date: 2/26/2022  Impression: There is an enlarging, large prevertebral hematoma within the cervical spine tracking inferiorly into the upper mediastinum  This is displacing the trachea anteriorly and resulting in moderate tracheal narrowing at the level of the tip of the endotracheal tube  There is brisk active arterial extravasation of contrast noted within the prevertebral hematoma immediately anterior to the C6-7 disc space, corresponding to a site of active bleeding  There is now widening of the disc space of C6-7 anteriorly, not present on the initial CT scan performed approximately 4 hours ago consistent with anterior longitudinal ligament disruption  Small avulsion fracture suspected  Stable posterior element fractures involving C5 and C6  No subluxation  I personally discussed this study with the trauma fellow on 2/26/2022 at 7:20 PM  Workstation performed: [id number]     CT cervical spine without contrast    Result Date: 2/26/2022  Impression: Extensive multilevel degenerative disc disease as above  Nondisplaced fractures in the lamina bilaterally and spinous process at C5 and C6  Displaced ossific focus anterior to the C6-7 disc space suggesting avulsed displaced anterior osteophyte  Large soft tissue abnormality anterior to the C6 and C7 likely represent prevertebral hematoma  I personally discussed this study with Cesar Rodriguez on 2/26/2022 at 3:21 PM   Workstation performed: [id number]     MRI cervical spine wo contrast    Result Date: 2/27/2022  Impression: Slight widening of the anterior aspect of the C6-7 disc space with rupture of the anterior longitudinal ligament  The posterior longitudinal ligament and ligamentum flavum appear intact  Patient has known posterior element fractures at C5 and C6 incompletely evaluated on this examination  There is mild interspinous ligament injury in the posterior soft tissues extending from C2 through C5  Large prevertebral space hematoma anterior to the cervical spine as described above   No cord compression or abnormal cord signal identified  Workstation performed: [id number]     CT chest abdomen pelvis w contrast    Result Date: 2/28/2022  Impression: Chest: 1  Decreased size of a prevertebral and mediastinal hematoma extending outside of the field of view into the neck  See concurrent CTA head/neck for further evaluation  There is diminished mass effect on the trachea  2   Small bilateral subacute hemothoraces with bibasilar atelectasis  3   Spiculated left breast mass measuring up to 2 9 cm  Recommend correlation with mammography  Abdomen/pelvis: 1  Acute right retroperitoneal hematoma expanding the right iliopsoas and iliacus musculature  Small amount of subacute hemoperitoneum in the right paracolic gutter and pelvis 2  Cholelithiasis with edematous wall thickening and pericholecystic fluid which could represent acute cholecystitis in the appropriate clinical scenario  Intra-axial hepatic biliary duct dilatation without evidence of obstructing stone  I personally discussed this study with Cami Pandya on 2/28/2022 at 4:24 PM  Workstation performed: [id number]     CT recon (no charge)    Result Date: 2/26/2022  Impression: Acute mildly displaced left nasal bone fractures  Otherwise no facial fracture identified  Hematoma anterior to the frontal bone in the midline tracks inferiorly into the preseptal soft tissues, left greater than right and into the left cheek  Workstation performed: [id number]       EKG, Pathology, and Other Studies: I have personally reviewed pertinent reports        VTE Pharmacologic Prophylaxis: Sequential compression device (Venodyne)  and Heparin    VTE Mechanical Prophylaxis: sequential compression device

## 2022-03-02 NOTE — OCCUPATIONAL THERAPY NOTE
OT CANCEL NOTE    OT orders received  Chart reviewed  Pt is currently intubated/sedated and not appropriate to engage in skilled OT services at this time  Will D/C OT at this time  Please re-consult once medically stable  Thanks       03/02/22 4105   OT Last Visit   OT Visit Date 03/02/22   Note Type   Note type Evaluation; Cancelled Session   Cancel Reasons Intubated/sedated       Imtiaz Muñoz MS, OTR/L

## 2022-03-02 NOTE — PROGRESS NOTES
Daily Progress Note - [address] [facility] 64 y o  female MRN: [id number]  Unit/Bed#: ICU 09 Encounter: [id number]        ----------------------------------------------------------------------------------------  HPI/24hr events: 64year old female admitted due to fall with neck extension resulting in nondisplaced fractures in the lamina bilaterally and and spinous process at C5 and C6  Displaced ossific focus anterior to the C6-C7 disc space suggesting avulsed displaced anterior osteophyte  Enlarging hematoma with active extra anterior to C6 and C7  Patient additionally with prevertebral hematoma and acute right retroperitoneal hematoma  Overnight, patient with acute episode of agitation with sinus tachycardia  Administered propofol bolus and subsequently developed hypotension  Required ephraim to maintain MAP>85  Remains hemodynamically stable      ---------------------------------------------------------------------------------------  SUBJECTIVE  Unable to offer verbal complaints  Review of Systems   Unable to perform ROS: Intubated     Review of systems was reviewed and negative unless stated above in HPI/24-hour events   ---------------------------------------------------------------------------------------  Assessment and Plan:    Neuro:    Diagnosis: Acute cervical fractures and ligamentous injury   o Plan:   - CT spine: Nondisplaced fractures in the lamina bilaterally and spinous process at C5 and C6  Displaced ossific focus anterior to the C6-7 disc space suggesting avulsed displaced anterior osteophyte  Large soft tissue abnormality anterior to the C6 and C7 likely represent prevertebral hematoma  - CTA: There is an enlarging, large prevertebral hematoma within the cervical spine tracking inferiorly into the upper mediastinum  This is displacing the trachea anteriorly and resulting in moderate tracheal narrowing at the level of the tip of the endotracheal tube   There is brisk active arterial extravasation of contrast noted within the prevertebral hematoma immediately anterior to the C6-7 disc space, corresponding to a site of active bleeding  There is now widening of the disc space of C6-7 anteriorly, not present on the initial CT scan performed approximately 4 hours ago consistent with anterior longitudinal ligament disruption  Small avulsion fracture suspected  Stable posterior element fractures involving C5 and C6  No subluxation   - MRI: Slight widening of the anterior aspect of the C6-7 disc space with rupture of the anterior longitudinal ligament  The posterior longitudinal ligament and ligamentum flavum appear intact  There is mild interspinous ligament injury in the posterior soft tissues extending from C2 through C5  Large prevertebral space hematoma anterior to the cervical spine as described above  No cord compression or abnormal cord signal identified   - Neurosurgery following   - Continue frequent neuro exams per protocol   - Continue cervical collar at all times   - Cervical spine precautions   - Operative plan for fixation Friday    Diagnosis: Pain/Sedation   o Plan:   - Continue propofol gtt   - Continue fentanyl gtt   - RASS goal 0 to -1       CV:    Diagnosis: HTN   o Plan:   - Hold home anti-hypertensive regimen   - MAP goal >85, SBP <160    - Initiated on ephraim infusion overnight due to low MAP  - Last echo 12/30/16: EF 55%, G1DD   - Previously requiring cardene infusion, now off       Pulm:   Diagnosis: Acute respiratory failure   o Plan:   - Intubated for airway protection   - High risk airway, cric kit in room   - Mechanical ventilation D5  - Plan to remain intubate until OR   - Trach conversation pending   SBT daily       GI:    Diagnosis: No acute issues   o Plan:   - GI ppx: Pepcid       :    Diagnosis: MILVIA, resolving   o Plan:   - Continue to trend BUN/Cr  - Strict I/O      F/E/N:    Plan:   o Fluids: Continue maintenance fluids   o Electrolytes:  Will replete as necessary to maintain potassium > 4 0, magnesium > 2 0, and phosphrous > 3 0    o Nutrition: NPO       Heme/Onc:    Diagnosis: Acute blood loss anemia   o Plan:   - S/p 2 u PRBC 2/27-2/28 and an addition 2 U PRBC 2/28    - Continue to trend blood counts   - Continue SQH       Endo:    Diagnosis: No acute issues   o Plan:   - Blood glucose goal 140-180   - Initiate SSI if indicated       ID:    Diagnosis: No active source of infection   o Plan:   - Continue to monitor off antibiotics   - Continue to trend WBC count and fever curve   - No cultures to follow       MSK/Skin:    Diagnosis: At risk for pressure injury   o Plan:   - Frequent turning/pressure offloading    Diagnosis: Bilateral nasal bone fractures   o Plan:   - No surgical intervention at this time    Diagnosis: Superficial abrasions/lacerations   o Plan:   - Local wound care as needed     Patient appropriate for transfer out of the ICU today?: No  Disposition: Continue Critical Care   Code Status: Level 1 - Full Code  ---------------------------------------------------------------------------------------  ICU CORE MEASURES    Prophylaxis   VTE Pharmacologic Prophylaxis: Heparin  VTE Mechanical Prophylaxis: sequential compression device  Stress Ulcer Prophylaxis: Famotidine IV     ABCDE Protocol (if indicated)  Plan to perform spontaneous awakening trial today? Yes  Plan to perform spontaneous breathing trial today? Yes  Obvious barriers to extubation?  Yes  CAM-ICU: Positive    Invasive Devices Review  Invasive Devices  Report    Peripheral Intravenous Line            Peripheral IV Right Forearm -- days    Peripheral IV 02/26/22 Proximal;Right;Ventral (anterior) Forearm 3 days    Peripheral IV 02/27/22 Left;Proximal;Ventral (anterior) Antecubital 3 days          Arterial Line            Arterial Line 02/26/22 Left Radial 3 days          Drain            NG/OG/Enteral Tube Orogastric 16 Fr Left mouth 3 days    Urethral Catheter Temperature probe 16 Fr  3 days          Airway            ETT  Cuffed 6 mm -- days              Can any invasive devices be discontinued today? No  ---------------------------------------------------------------------------------------  OBJECTIVE    Vitals   Vitals:    22 2200 22 2300 22 0000 22 0100   BP: 134/61 (!) 93/49 (!) 102/47 105/54   BP Location:   Right arm    Pulse: 80 86 74 72   Resp: 12 13 13 (!) 9   Temp:   98 2 °F (36 8 °C)    TempSrc:   Oral    SpO2: 98% 97% 99% 99%   Weight:       Height:         Temp (24hrs), Av 4 °F (36 9 °C), Min:98 °F (36 7 °C), Max:98 9 °F (37 2 °C)  Current: Temperature: 98 2 °F (36 8 °C)      Respiratory:  SpO2: SpO2: 99 %, SpO2 Activity: SpO2 Activity: At Rest, SpO2 Device: O2 Device: Ventilator  Nasal Cannula O2 Flow Rate (L/min): 2 L/min    Invasive/non-invasive ventilation settings   Respiratory  Report   Lab Data (Last 4 hours)    None         O2/Vent Data (Last 4 hours)    None                Physical Exam  Constitutional:       General: She is not in acute distress  Appearance: She is normal weight  She is ill-appearing  She is not toxic-appearing or diaphoretic  HENT:      Head: Normocephalic  Mouth/Throat:      Mouth: Mucous membranes are moist       Pharynx: Oropharynx is clear  Eyes:      General: No scleral icterus  Conjunctiva/sclera: Conjunctivae normal       Pupils: Pupils are equal, round, and reactive to light  Neck:      Comments: C collar  Cardiovascular:      Rate and Rhythm: Bradycardia present  Rhythm irregular  Pulmonary:      Effort: Pulmonary effort is normal  No respiratory distress  Abdominal:      General: There is no distension  Palpations: Abdomen is soft  Tenderness: There is no abdominal tenderness  Musculoskeletal:         General: No swelling or deformity  Normal range of motion  Right lower leg: No edema  Left lower leg: No edema  Skin:     General: Skin is warm and dry  Coloration: Skin is not jaundiced  Findings: Bruising present  Neurological:      General: No focal deficit present  Mental Status: She is alert  Laboratory and Diagnostics:  Results from last 7 days   Lab Units 03/01/22 0517 02/28/22 2354 02/28/22 1801 02/28/22  1304 02/28/22  0518 02/27/22  2359 02/27/22  1844 02/27/22  1147 02/27/22  0450 02/26/22  1531 02/26/22  1531   WBC Thousand/uL 8 70  --   --   --  9 91  --   --   --  9 64  --  6 67   HEMOGLOBIN g/dL 9 2* 9 4* 9 5* 6 7* 6 5* 6 7* 7 0*   < > 6 8*   < > 9 2*   HEMATOCRIT % 26 4* 26 1* 27 5* 20 2* 20 0* 21 0* 22 0*   < > 21 2*   < > 29 5*   PLATELETS Thousands/uL 105*  --   --   --  107*  --   --   --  140*  --  173   NEUTROS PCT % 87*  --   --   --   --   --   --   --  86*  --  65   MONOS PCT % 5  --   --   --   --   --   --   --  3*  --  6    < > = values in this interval not displayed       Results from last 7 days   Lab Units 03/01/22 2258 03/01/22 0517 02/28/22 2354 02/28/22  1801 02/28/22  0518 02/27/22  0450 02/26/22  1531   SODIUM mmol/L 140 137 138 137 136 137 138   POTASSIUM mmol/L 3 9 4 0 4 1 4 1 4 4 3 4* 3 4*   CHLORIDE mmol/L 111* 109* 110* 109* 111* 110* 106   CO2 mmol/L 27 25 24 23 22 24 27   ANION GAP mmol/L 2* 3* 4 5 3* 3* 5   BUN mg/dL 48* 52* 54* 51* 54* 29* 31*   CREATININE mg/dL 1 01 1 14 1 33* 1 35* 1 74* 1 04 1 00   CALCIUM mg/dL 8 5 8 4 8 4 8 4 8 6 8 8 8 6   GLUCOSE RANDOM mg/dL 124 135 127 135 140 163* 104   ALT U/L  --   --   --   --   --   --  63   AST U/L  --   --   --   --   --   --  32   ALK PHOS U/L  --   --   --   --   --   --  98   ALBUMIN g/dL  --   --   --   --   --   --  2 2*   TOTAL BILIRUBIN mg/dL  --   --   --   --   --   --  0 43     Results from last 7 days   Lab Units 03/01/22  2258 02/28/22  0518 02/27/22  0450   MAGNESIUM mg/dL 2 4 2 0 1 6   PHOSPHORUS mg/dL 2 9 5 9* 6 1*      Results from last 7 days   Lab Units 02/26/22  1545   INR  1 12   PTT seconds 28              ABG:  Results from last 7 days   Lab Units 02/27/22  0748   PH ART  7 405   PCO2 ART mm Hg 37 2   PO2 ART mm Hg 139 8*   HCO3 ART mmol/L 22 8   BASE EXC ART mmol/L -1 7   ABG SOURCE  Line, Arterial     VBG:  Results from last 7 days   Lab Units 02/27/22  0748   ABG SOURCE  Line, Arterial           Micro        EKG: Reviewed       Imaging:  I have personally reviewed pertinent films in PACS    Intake and Output  I/O       02/28 0701 03/01 0700 03/01 0701 03/02 0700    I V  (mL/kg) 3298 9 (45 2) 2566 1 (35 2)    Blood 860     NG/GT  120    IV Piggyback  250    Feedings  110    Total Intake(mL/kg) 4158 9 (57) 3046 1 (41 7)    Urine (mL/kg/hr) 1570 (0 9) 1300 (0 7)    Emesis/NG output 50     Total Output 1620 1300    Net +2538 9 +1746 1                   Height and Weights   Height: 5' 5" (165 1 cm)     Body mass index is 26 78 kg/m²  Weight (last 2 days)     None            Nutrition       Diet Orders   (From admission, onward)             Start     Ordered    03/01/22 1457  Diet Enteral/Parenteral; Tube Feeding No Oral Diet; Jevity 1 2 Adilson; Continuous; 65; Prosource Protein Liquid - Two Packets  Diet effective now        Comments: Start at 10 and advance by 10 every 4 hours to goal   References:    Nutrtion Support Algorithm Enteral vs  Parenteral   Question Answer Comment   Diet Type Enteral/Parenteral    Enteral/Parenteral Tube Feeding No Oral Diet    Tube Feeding Formula: Jevity 1 2 Adilson    Bolus/Cyclic/Continuous Continuous    Tube Feeding Goal Rate (mL/hr): 65    Prosource Protein Liquid - No Carb Prosource Protein Liquid - Two Packets    RD to adjust diet per protocol?  No        03/01/22 1457                    Active Medications  Scheduled Meds:  Current Facility-Administered Medications   Medication Dose Route Frequency Provider Last Rate    chlorhexidine  15 mL Mouth/Throat Q12H [address] Jolly Bush MD      fentaNYL  75 mcg/hr Intravenous Continuous Wilman Lee MD 75 mcg/hr (03/01/22 1241)    fentanyl citrate (PF)  50 mcg Intravenous Q1H PRN Jasmin Reese      heparin (porcine)  5,000 Units Subcutaneous [facility] Aviva Leach DO      hydrALAZINE  10 mg Intravenous Q6H PRN ETIENNE Thornton      insulin lispro  1-5 Units Subcutaneous Q6H [address] Evelio Antoine PA-C      metoclopramide  10 mg Intravenous Once PRN Stephanie Tomlin CRNA      multi-electrolyte  125 mL/hr Intravenous Continuous Patel Romero  mL/hr (03/01/22 1554)    phenylephine   mcg/min Intravenous Titrated Riaz Stokes MD      propofol  5-50 mcg/kg/min Intravenous Titrated Omar Garcia MD 30 mcg/kg/min (03/01/22 1852)     Continuous Infusions:  fentaNYL, 75 mcg/hr, Last Rate: 75 mcg/hr (03/01/22 1241)  multi-electrolyte, 125 mL/hr, Last Rate: 125 mL/hr (03/01/22 1554)  phenylephine,  mcg/min  propofol, 5-50 mcg/kg/min, Last Rate: 30 mcg/kg/min (03/01/22 1852)      PRN Meds:   fentanyl citrate (PF), 50 mcg, Q1H PRN  hydrALAZINE, 10 mg, Q6H PRN  metoclopramide, 10 mg, Once PRN        Allergies   No Known Allergies  ---------------------------------------------------------------------------------------  Advance Directive and Living Will:      Power of :    POLST:    ---------------------------------------------------------------------------------------  Care Time Delivered:   No Critical Care time spent     Rosangela Medicus, PA-C

## 2022-03-02 NOTE — ASSESSMENT & PLAN NOTE
Bilateral laminae and spinous process fractures of C5 and C6   · S/p mechanical trip and fall with hyperextension injury into washing machine  · Large anterior cervical spine prevertebral hematoma with active extravasation s/p angiogram on presentation  · Intubated for airway protection in setting of large hematoma  · Current exam no-focal  Intubated but nodding yes/no  In [place] collar  Denies neck pain or back pain  Moving all extremities  Denies numbness or weakness  Imaging:  · Cerebral angiogram 2/26/2022: negative, no active identified hemorrhage  · MRI cervical spine wo, 2/26/2022: Slight widening of the anterior aspect of the C6-7 disc space with rupture of the anterior longitudinal ligament  The posterior longitudinal ligament and ligamentum flavum appear intact  Patient has known posterior element fractures at C5 and C6 incompletely evaluated on this examination  There is mild interspinous ligament injury in the posterior soft tissues extending from C2 through C5  Large prevertebral space hematoma anterior to the cervical spine as described above  · CT cervical spine wo, 2/26/2022: Extensive multilevel degenerative disc disease as above  Nondisplaced fractures in the lamina bilaterally and spinous process at C5 and C6  Displaced ossific focus anterior to the C6-7 disc space suggesting avulsed displaced anterior osteophyte  Large soft tissue abnormality anterior to the C6 and C7 likely represent prevertebral hematoma  Plan:  · Continue to monitor neuro exam closely  · Transfuse as needed for active extravasation - Hgb 9 s/p 3 units PRBC  · Noted 2/28 with small retroperitoneal bleed, no intervention  · Maintain in cervical brace at all times  · Hold PT/OT secondary to instability  · Wean to extubate as able  · Maintain MAP > 85 mmHg  · Anticipate surgical intervention (C3/C4-T1 posterior decompression/fusion) once hemodynamically stable, TBD, potentially Friday 3/4 with Dr Bambi Johnson   Will phone family to discuss  · DVT ppx: SCDs, heparin SQ  Neurosurgery will continue to follow closely  Please call with any questions or concerns

## 2022-03-02 NOTE — PROGRESS NOTES
Vancomycin IV Pharmacy-to-Dose Consultation    Get Deluna is a 64 y o  female who is currently receiving vancomycin IV with management by the Pharmacy Consult service  Relevant clinical data and objective / subjective history reviewed  Vancomycin Assessment:  Indication: PNA  Status: critically ill  Micro:   3/2 blood, MRSA, and sputum cx sent  Procalcitonin: none ordered  Renal Function: stable, serum creatinine around baseline; UOP 1 mL/kg/hr   Days of Therapy: 1  Current Dose: new start  Goal Trough: 15-20  Goal AUC(24h): 400-600  Last Level: n/a      Vancomycin Plan:  New Dosing: change to 1750 mg (25 mg/kg) IV x1 followed by 1500 mg (20 mg/kg) IV q24h  Next Level: random level 3/4 prior to 3rd dose to ensure level is >10 but <20  Renal Function Monitoring: daily BMP and UOP assessment      Pharmacy will continue to follow closely for s/sx of nephrotoxicity, infusion reactions and appropriateness of therapy  BMP and CBC will be ordered per protocol  We will continue to follow the patient's culture results and clinical progress daily         Thank you,  Lona Ibarra, PharmD, [facility] Pharmacist  [phone]

## 2022-03-03 NOTE — PLAN OF CARE
Problem: MOBILITY - ADULT  Goal: Maintain or return to baseline ADL function  Description: INTERVENTIONS:  -  Assess patient's ability to carry out ADLs; assess patient's baseline for ADL function and identify physical deficits which impact ability to perform ADLs (bathing, care of mouth/teeth, toileting, grooming, dressing, etc )  - Assess/evaluate cause of self-care deficits   - Assess range of motion  - Assess patient's mobility; develop plan if impaired  - Assess patient's need for assistive devices and provide as appropriate  - Encourage maximum independence but intervene and supervise when necessary  - Involve family in performance of ADLs  - Assess for home care needs following discharge   - Consider OT consult to assist with ADL evaluation and planning for discharge  - Provide patient education as appropriate  Outcome: Progressing  Goal: Maintains/Returns to pre admission functional level  Description: INTERVENTIONS:  - Perform BMAT or MOVE assessment daily    - Set and communicate daily mobility goal to care team and patient/family/caregiver  - Collaborate with rehabilitation services on mobility goals if consulted  - Perform Range of Motion 6 times a day  - Reposition patient every 2 hours    - Out of bed for toileting- Record patient progress and toleration of activity level   Outcome: Progressing     Problem: Prexisting or High Potential for Compromised Skin Integrity  Goal: Skin integrity is maintained or improved  Description: INTERVENTIONS:  - Identify patients at risk for skin breakdown  - Assess and monitor skin integrity  - Assess and monitor nutrition and hydration status  - Monitor labs   - Assess for incontinence   - Turn and reposition patient  - Assist with mobility/ambulation  - Relieve pressure over bony prominences  - Avoid friction and shearing  - Provide appropriate hygiene as needed including keeping skin clean and dry  - Evaluate need for skin moisturizer/barrier cream  - Collaborate with interdisciplinary team   - Patient/family teaching  - Consider wound care consult   Outcome: Progressing     Problem: PAIN - ADULT  Goal: Verbalizes/displays adequate comfort level or baseline comfort level  Description: Interventions:  - Encourage patient to monitor pain and request assistance  - Assess pain using appropriate pain scale  - Administer analgesics based on type and severity of pain and evaluate response  - Implement non-pharmacological measures as appropriate and evaluate response  - Consider cultural and social influences on pain and pain management  - Notify physician/advanced practitioner if interventions unsuccessful or patient reports new pain  Outcome: Progressing     Problem: INFECTION - ADULT  Goal: Absence or prevention of progression during hospitalization  Description: INTERVENTIONS:  - Assess and monitor for signs and symptoms of infection  - Monitor lab/diagnostic results  - Monitor all insertion sites, i e  indwelling lines, tubes, and drains  - Monitor endotracheal if appropriate and nasal secretions for changes in amount and color  - Lamont appropriate cooling/warming therapies per order  - Administer medications as ordered  - Instruct and encourage patient and family to use good hand hygiene technique  - Identify and instruct in appropriate isolation precautions for identified infection/condition  Outcome: Progressing     Problem: SAFETY ADULT  Goal: Maintain or return to baseline ADL function  Description: INTERVENTIONS:  -  Assess patient's ability to carry out ADLs; assess patient's baseline for ADL function and identify physical deficits which impact ability to perform ADLs (bathing, care of mouth/teeth, toileting, grooming, dressing, etc )  - Assess/evaluate cause of self-care deficits   - Assess range of motion  - Assess patient's mobility; develop plan if impaired  - Assess patient's need for assistive devices and provide as appropriate  - Encourage maximum independence but intervene and supervise when necessary  - Involve family in performance of ADLs  - Assess for home care needs following discharge   - Consider OT consult to assist with ADL evaluation and planning for discharge  - Provide patient education as appropriate  Outcome: Progressing  Goal: Maintains/Returns to pre admission functional level  Description: INTERVENTIONS:  - Perform BMAT or MOVE assessment daily    - Set and communicate daily mobility goal to care team and patient/family/caregiver  - Collaborate with rehabilitation services on mobility goals if consulted  - Perform Range of Motion 6 times a day  - Reposition patient every 2 hours    - Record patient progress and toleration of activity level   Outcome: Progressing  Goal: Patient will remain free of falls  Description: INTERVENTIONS:  - Educate patient/family on patient safety including physical limitations  - Instruct patient to call for assistance with activity   - Consult OT/PT to assist with strengthening/mobility   - Keep Call bell within reach  - Keep bed low and locked with side rails adjusted as appropriate  - Keep care items and personal belongings within reach  - Initiate and maintain comfort rounds  - Make Fall Risk Sign visible to staff  - Offer Toileting every 2 Hours, in advance of need  - Initiate/Maintain Bed/Chair alarm  - Obtain necessary fall risk management equipment  - Apply yellow socks and bracelet for high fall risk patients  - Consider moving patient to room near nurses station  Outcome: Progressing     Problem: DISCHARGE PLANNING  Goal: Discharge to home or other facility with appropriate resources  Description: INTERVENTIONS:  - Identify barriers to discharge w/patient and caregiver  - Arrange for needed discharge resources and transportation as appropriate  - Identify discharge learning needs (meds, wound care, etc )  - Arrange for interpretive services to assist at discharge as needed  - Refer to Case Management Department for coordinating discharge planning if the patient needs post-hospital services based on physician/advanced practitioner order or complex needs related to functional status, cognitive ability, or social support system  Outcome: Progressing     Problem: Knowledge Deficit  Goal: Patient/family/caregiver demonstrates understanding of disease process, treatment plan, medications, and discharge instructions  Description: Complete learning assessment and assess knowledge base    Interventions:  - Provide teaching at level of understanding  - Provide teaching via preferred learning methods  Outcome: Progressing     Problem: SAFETY,RESTRAINT: NV/NON-SELF DESTRUCTIVE BEHAVIOR  Goal: Remains free of harm/injury (restraint for non violent/non self-detsructive behavior)  Description: INTERVENTIONS:  - Instruct patient/family regarding restraint use   - Assess and monitor physiologic and psychological status   - Provide interventions and comfort measures to meet assessed patient needs   - Identify and implement measures to help patient regain control  - Assess readiness for release of restraint   Outcome: Progressing  Goal: Returns to optimal restraint-free functioning  Description: INTERVENTIONS:  - Assess the patient's behavior and symptoms that indicate continued need for restraint  - Identify and implement measures to help patient regain control  - Assess readiness for release of restraint   Outcome: Progressing     Problem: Potential for Falls  Goal: Patient will remain free of falls  Description: INTERVENTIONS:  - Educate patient/family on patient safety including physical limitations  - Instruct patient to call for assistance with activity   - Consult OT/PT to assist with strengthening/mobility   - Keep Call bell within reach  - Keep bed low and locked with side rails adjusted as appropriate  - Keep care items and personal belongings within reach  - Initiate and maintain comfort rounds  - Make Fall Risk Sign visible to staff  - Offer Toileting every 2 Hours, in advance of need  - Initiate/Maintain Bed/Chair alarm  - Obtain necessary fall risk management equipment  - Apply yellow socks and bracelet for high fall risk patients  - Consider moving patient to room near nurses station  Outcome: Progressing     Problem: Nutrition/Hydration-ADULT  Goal: Nutrient/Hydration intake appropriate for improving, restoring or maintaining nutritional needs  Description: Monitor and assess patient's nutrition/hydration status for malnutrition  Collaborate with interdisciplinary team and initiate plan and interventions as ordered  Monitor patient's weight and dietary intake as ordered or per policy  Utilize nutrition screening tool and intervene as necessary  Determine patient's food preferences and provide high-protein, high-caloric foods as appropriate       INTERVENTIONS:  - Monitor oral intake, urinary output, labs, and treatment plans  - Assess nutrition and hydration status and recommend course of action  - Evaluate amount of meals eaten  - Assist patient with eating if necessary   - Allow adequate time for meals  - Recommend/ encourage appropriate diets, oral nutritional supplements, and vitamin/mineral supplements  - Order, calculate, and assess calorie counts as needed  - Recommend, monitor, and adjust tube feedings and TPN/PPN based on assessed needs  - Assess need for intravenous fluids  - Provide specific nutrition/hydration education as appropriate  - Include patient/family/caregiver in decisions related to nutrition  Outcome: Progressing     Problem: NEUROSENSORY - ADULT  Goal: Achieves stable or improved neurological status  Description: INTERVENTIONS  - Monitor and report changes in neurological status  - Monitor vital signs such as temperature, blood pressure, glucose, and any other labs ordered   - Initiate measures to prevent increased intracranial pressure  - Monitor for seizure activity and implement precautions if appropriate      Outcome: Progressing  Goal: Achieves maximal functionality and self care  Description: INTERVENTIONS  - Monitor swallowing and airway patency with patient fatigue and changes in neurological status  - Encourage and assist patient to increase activity and self care     - Encourage visually impaired, hearing impaired and aphasic patients to use assistive/communication devices  Outcome: Progressing     Problem: CARDIOVASCULAR - ADULT  Goal: Maintains optimal cardiac output and hemodynamic stability  Description: INTERVENTIONS:  - Monitor I/O, vital signs and rhythm  - Monitor for S/S and trends of decreased cardiac output  - Administer and titrate ordered vasoactive medications to optimize hemodynamic stability  - Assess quality of pulses, skin color and temperature  - Assess for signs of decreased coronary artery perfusion  - Instruct patient to report change in severity of symptoms  Outcome: Progressing  Goal: Absence of cardiac dysrhythmias or at baseline rhythm  Description: INTERVENTIONS:  - Continuous cardiac monitoring, vital signs, obtain 12 lead EKG if ordered  - Administer antiarrhythmic and heart rate control medications as ordered  - Monitor electrolytes and administer replacement therapy as ordered  Outcome: Progressing     Problem: RESPIRATORY - ADULT  Goal: Achieves optimal ventilation and oxygenation  Description: INTERVENTIONS:  - Assess for changes in respiratory status  - Assess for changes in mentation and behavior  - Position to facilitate oxygenation and minimize respiratory effort  - Oxygen administered by appropriate delivery if ordered  - Initiate smoking cessation education as indicated  - Encourage broncho-pulmonary hygiene including cough, deep breathe, Incentive Spirometry  - Assess the need for suctioning and aspirate as needed  - Assess and instruct to report SOB or any respiratory difficulty  - Respiratory Therapy support as indicated  Outcome: Progressing     Problem: GASTROINTESTINAL - ADULT  Goal: Maintains or returns to baseline bowel function  Description: INTERVENTIONS:  - Assess bowel function  - Encourage oral fluids to ensure adequate hydration  - Administer IV fluids if ordered to ensure adequate hydration  - Administer ordered medications as needed  - Encourage mobilization and activity  - Consider nutritional services referral to assist patient with adequate nutrition and appropriate food choices  Outcome: Progressing  Goal: Maintains adequate nutritional intake  Description: INTERVENTIONS:  - Monitor percentage of each meal consumed  - Identify factors contributing to decreased intake, treat as appropriate  - Assist with meals as needed  - Monitor I&O, weight, and lab values if indicated  - Obtain nutrition services referral as needed  Outcome: Progressing     Problem: GENITOURINARY - ADULT  Goal: Urinary catheter remains patent  Description: INTERVENTIONS:  - Assess patency of urinary catheter  - If patient has a chronic mayers, consider changing catheter if non-functioning  - Follow guidelines for intermittent irrigation of non-functioning urinary catheter  Outcome: Progressing     Problem: METABOLIC, FLUID AND ELECTROLYTES - ADULT  Goal: Electrolytes maintained within normal limits  Description: INTERVENTIONS:  - Monitor labs and assess patient for signs and symptoms of electrolyte imbalances  - Administer electrolyte replacement as ordered  - Monitor response to electrolyte replacements, including repeat lab results as appropriate  - Instruct patient on fluid and nutrition as appropriate  Outcome: Progressing  Goal: Fluid balance maintained  Description: INTERVENTIONS:  - Monitor labs   - Monitor I/O and WT  - Instruct patient on fluid and nutrition as appropriate  - Assess for signs & symptoms of volume excess or deficit  Outcome: Progressing     Problem: SKIN/TISSUE INTEGRITY - ADULT  Goal: Skin Integrity remains intact(Skin Breakdown Prevention)  Description: Assess:  -Perform James assessment every shift  -Clean and moisturize skin every shift  -Inspect skin when repositioning, toileting, and assisting with ADLS  -Assess under medical devices every shift  -Assess extremities for adequate circulation and sensation     Bed Management:  -Have minimal linens on bed & keep smooth, unwrinkled  -Change linens as needed when moist or perspiring  -Avoid sitting or lying in one position for more than 2 hours while in bed  -Keep HOB at 30 degrees     Toileting:  -Offer bedside commode  -Assess for incontinence every 2 hours  -Use incontinent care products after each incontinent episode     Activity:  -Encourage or provide ROM exercises   -Turn and reposition patient every 2 Hours  -Use appropriate equipment to lift or move patient in bed  -Instruct/ Assist with weight shifting every 1 hour when out of bed in chair  -Consider limitation of chair time 3 hour intervals    Skin Care:  -Avoid use of baby powder, tape, friction and shearing, hot water or constrictive clothing  -Do not massage red bony areas    Outcome: Progressing     Problem: HEMATOLOGIC - ADULT  Goal: Maintains hematologic stability  Description: INTERVENTIONS  - Assess for signs and symptoms of bleeding or hemorrhage  - Monitor labs  - Administer supportive blood products/factors as ordered and appropriate  Outcome: Progressing     Problem: MUSCULOSKELETAL - ADULT  Goal: Maintain or return mobility to safest level of function  Description: INTERVENTIONS:  - Assess patient's ability to carry out ADLs; assess patient's baseline for ADL function and identify physical deficits which impact ability to perform ADLs (bathing, care of mouth/teeth, toileting, grooming, dressing, etc )  - Assess/evaluate cause of self-care deficits   - Assess range of motion  - Assess patient's mobility  - Assess patient's need for assistive devices and provide as appropriate  - Encourage maximum independence but intervene and supervise when necessary  - Involve family in performance of ADLs  - Assess for home care needs following discharge   - Consider OT consult to assist with ADL evaluation and planning for discharge  - Provide patient education as appropriate  Outcome: Progressing  Goal: Maintain proper alignment of affected body part  Description: INTERVENTIONS:  - Support, maintain and protect limb and body alignment  - Provide patient/ family with appropriate education  Outcome: Progressing     Problem: COPING  Goal: Pt/Family able to verbalize concerns and demonstrate effective coping strategies  Description: INTERVENTIONS:  - Assist patient/family to identify coping skills, available support systems and cultural and spiritual values  - Provide emotional support, including active listening and acknowledgement of concerns of patient and caregivers  - Reduce environmental stimuli, as able  - Provide patient education  - Assess for spiritual pain/suffering and initiate spiritual care, including notification of Pastoral Care or [facility] based community as needed  - Assess effectiveness of coping strategies  Outcome: Progressing  Goal: Will report anxiety at manageable levels  Description: INTERVENTIONS:  - Administer medication as ordered  - Teach and encourage coping skills  - Provide emotional support  - Assess patient/family for anxiety and ability to cope  Outcome: Progressing

## 2022-03-03 NOTE — PROGRESS NOTES
[address] [facility]  Progress Note - Eladio Herrera 1945, 64 y o  female MRN: [id number]  Unit/Bed#: ICU 09 Encounter: [id number]  Primary Care Provider: Ceasar Lucio MD   Date and time admitted to hospital: 2/26/2022  5:25 PM    Hematoma of neck  Assessment & Plan  See above  Angiogram was negative  * Cervical spine fracture (HCC)  Assessment & Plan  Bilateral laminae and spinous process fractures of C5 and C6   · S/p mechanical trip and fall with hyperextension injury into washing machine  · Large anterior cervical spine prevertebral hematoma with active extravasation s/p angiogram on presentation  · Intubated for airway protection in setting of large hematoma  · Current exam no-focal  Intubated but nodding yes/no  In vista collar  Denies neck pain or back pain  Moving all extremities  Denies numbness or weakness  Imaging:  · Cerebral angiogram 2/26/2022: negative, no active identified hemorrhage  · MRI cervical spine wo, 2/26/2022: Slight widening of the anterior aspect of the C6-7 disc space with rupture of the anterior longitudinal ligament  The posterior longitudinal ligament and ligamentum flavum appear intact  Patient has known posterior element fractures at C5 and C6 incompletely evaluated on this examination  There is mild interspinous ligament injury in the posterior soft tissues extending from C2 through C5  Large prevertebral space hematoma anterior to the cervical spine as described above  · CT cervical spine wo, 2/26/2022: Extensive multilevel degenerative disc disease as above  Nondisplaced fractures in the lamina bilaterally and spinous process at C5 and C6  Displaced ossific focus anterior to the C6-7 disc space suggesting avulsed displaced anterior osteophyte  Large soft tissue abnormality anterior to the C6 and C7 likely represent prevertebral hematoma  Plan:  · Continue to monitor neuro exam closely    · Transfuse as needed for active extravasation - Hgb 9 4 s/p 3 units PRBC  · Noted 2/28 with small retroperitoneal bleed, no intervention  · Maintain in cervical brace at all times  · Hold PT/OT secondary to instability  · Wean to extubate as able  · Maintain MAP > 85 mmHg  · Anticipate surgical intervention (C3/C4-T1 posterior decompression/fusion) once hemodynamically stable, TBD, potentially Friday 3/4 with Dr Leeanne Olivares  Will phone family to discuss  · DVT ppx: SCDs, heparin SQ  Neurosurgery will continue to follow closely  Please call with any questions or concerns  Subjective/Objective   Chief Complaint: N/A    Subjective: Intubated and sedated  Nods head yes and no  Objective: NAD  Moving all extremities to command off sedation  Cervical brace in place  I/O       03/01 0701 03/02 0700 03/02 0701 03/03 0700 03/03 0701 03/04 0700    I V  (mL/kg) 3130 3 (42 9) 2113 7 (29) 96 5 (1 3)    Blood       NG/      IV Piggyback 250 616 7     Feedings 230 1040 130    Total Intake(mL/kg) 3730 3 (51 1) 3770 4 (51 6) 226 5 (3 1)    Urine (mL/kg/hr) 1700 (1) 1625 (0 9) 165 (0 8)    Emesis/NG output       Total Output 1700 1625 165    Net +2030 3 +2145 4 +61 5                 Invasive Devices  Report    Peripheral Intravenous Line            Peripheral IV 03/02/22 Right Antecubital <1 day    Peripheral IV 03/03/22 Distal;Right;Ventral (anterior) Forearm <1 day    Peripheral IV 03/03/22 Dorsal (posterior); Left Hand <1 day          Arterial Line            Arterial Line 02/26/22 Left Radial 4 days          Drain            NG/OG/Enteral Tube Orogastric 16 Fr Left mouth 4 days    Urethral Catheter Temperature probe 16 Fr  4 days          Airway            ETT  Cuffed 6 mm -- days                Physical Exam:  Vitals: Blood pressure 155/74, pulse 85, temperature 99 3 °F (37 4 °C), temperature source Probe, resp  rate 21, height 5' 5" (1 651 m), weight 72 6 kg (160 lb), SpO2 97 %  ,Body mass index is 26 63 kg/m²      Hemodynamic Monitoring: MAP: Arterial Line MAP (mmHg): 106 mmHg    General appearance: intubated and sedated  Head: raccoon eyes  Eyes: EOMI, PERRL  Neck: vista brace  Back: no kyphosis present  Lungs: mechanically ventilated, AC/VC  Heart: regular heart rate  Neurologic:   Mental status: GCS 10T (E3, V1T, M6)  Cranial nerves: grossly intact (Cranial nerves II-XII)  Sensory: normal to LT  Motor: moving all extremities without focal weakness  Reflexes: 2+ and symmetric, no Ryder's or clonus      Lab Results:  Results from last 7 days   Lab Units 03/03/22  0506 03/02/22  1355 03/02/22  0404 03/01/22  0517 03/01/22  0517 02/27/22  1147 02/27/22  0450   WBC Thousand/uL 17 27* 10 62* 9 29   < > 8 70   < > 9 64   HEMOGLOBIN g/dL 9 4* 10 1* 9 0*   < > 9 2*   < > 6 8*   HEMATOCRIT % 27 9* 29 7* 26 6*   < > 26 4*   < > 21 2*   PLATELETS Thousands/uL 141* 138* 125*   < > 105*   < > 140*   NEUTROS PCT %  --   --  82*  --  87*  --  86*   MONOS PCT %  --   --  6   < > 5  --  3*   MONO PCT % 17* 2*  --   --   --   --   --     < > = values in this interval not displayed  Results from last 7 days   Lab Units 03/03/22  0506 03/02/22  1355 03/02/22  0404 02/27/22  0450 02/26/22  1531   POTASSIUM mmol/L 3 9 3 6 3 9   < > 3 4*   CHLORIDE mmol/L 114* 112* 111*   < > 106   CO2 mmol/L 26 25 27   < > 27   BUN mg/dL 41* 45* 48*   < > 31*   CREATININE mg/dL 0 94 1 07 0 95   < > 1 00   CALCIUM mg/dL 9 2 8 9 8 5   < > 8 6   ALK PHOS U/L  --   --   --   --  98   ALT U/L  --   --   --   --  63   AST U/L  --   --   --   --  32    < > = values in this interval not displayed       Results from last 7 days   Lab Units 03/03/22  0506 03/02/22  0404 03/01/22  2258   MAGNESIUM mg/dL 2 2 2 3 2 4     Results from last 7 days   Lab Units 03/03/22  0506 03/02/22  0404 03/01/22  2258   PHOSPHORUS mg/dL 3 0 2 8 2 9     Results from last 7 days   Lab Units 02/26/22  1545   INR  1 12   PTT seconds 28     No results found for: TROPONINT  ABG:  Lab Results   Component Value Date    PHART 7 406 03/02/2022    QRD7YYI 40 5 03/02/2022    PO2ART 92 9 03/02/2022    YIN9NOJ 24 9 03/02/2022    BEART 0 2 03/02/2022    SOURCE Line, Arterial 03/02/2022       Imaging Studies: I have personally reviewed pertinent reports  and I have personally reviewed pertinent films in PACS    CTA head and neck w wo contrast    Result Date: 2/28/2022  Impression: No acute intracranial abnormality  Persistent large prevertebral soft tissue hematoma with extension inferiorly into posterior mediastinum  No active contrast extravasation identified, resolved since most recent CTA neck  Negative CTA head and neck for large vessel occlusion, dissection,  aneurysm, or high-grade stenosis  Unchanged acute small anteriorly displaced avulsed fracture fragment with widened anterior disc space at C6-C7 level, compatible with known anterior longitudinal ligament rupture  Unchanged acute nondisplaced fractures of bilateral lamina and spinous process at C5 and C6  Unchanged small subgaleal hematoma in bilateral frontal regions  Please see same-day CT chest abdomen pelvis with contrast for further evaluation  The study was marked in [facility] for immediate notification  Workstation performed: [id number]     XR chest portable    Result Date: 2/27/2022  Impression: Endotracheal tube as above  Widening of the right paratracheal stripe indicative of underlying adenopathy or mass  Right lower lobe subsegmental atelectasis  Workstation performed: [id number]     CT head without contrast    Result Date: 2/26/2022  Impression: No acute intracranial abnormality  Left nasal bone depressed comminuted fractures  Soft tissue swelling over the left infraorbital, nasal and supraorbital regions  The study was marked in [facility] for immediate notification   Workstation performed: [id number]     CTA neck w wo contrast    Result Date: 2/26/2022  Impression: There is an enlarging, large prevertebral hematoma within the cervical spine tracking inferiorly into the upper mediastinum  This is displacing the trachea anteriorly and resulting in moderate tracheal narrowing at the level of the tip of the endotracheal tube  There is brisk active arterial extravasation of contrast noted within the prevertebral hematoma immediately anterior to the C6-7 disc space, corresponding to a site of active bleeding  There is now widening of the disc space of C6-7 anteriorly, not present on the initial CT scan performed approximately 4 hours ago consistent with anterior longitudinal ligament disruption  Small avulsion fracture suspected  Stable posterior element fractures involving C5 and C6  No subluxation  I personally discussed this study with the trauma fellow on 2/26/2022 at 7:20 PM  Workstation performed: [id number]     CT cervical spine without contrast    Result Date: 2/26/2022  Impression: Extensive multilevel degenerative disc disease as above  Nondisplaced fractures in the lamina bilaterally and spinous process at C5 and C6  Displaced ossific focus anterior to the C6-7 disc space suggesting avulsed displaced anterior osteophyte  Large soft tissue abnormality anterior to the C6 and C7 likely represent prevertebral hematoma  I personally discussed this study with Marshall Rodas on 2/26/2022 at 3:21 PM   Workstation performed: [id number]     MRI cervical spine wo contrast    Result Date: 2/27/2022  Impression: Slight widening of the anterior aspect of the C6-7 disc space with rupture of the anterior longitudinal ligament  The posterior longitudinal ligament and ligamentum flavum appear intact  Patient has known posterior element fractures at C5 and C6 incompletely evaluated on this examination  There is mild interspinous ligament injury in the posterior soft tissues extending from C2 through C5  Large prevertebral space hematoma anterior to the cervical spine as described above  No cord compression or abnormal cord signal identified   Workstation performed: [id number]     CT chest abdomen pelvis w contrast    Result Date: 2/28/2022  Impression: Chest: 1  Decreased size of a prevertebral and mediastinal hematoma extending outside of the field of view into the neck  See concurrent CTA head/neck for further evaluation  There is diminished mass effect on the trachea  2   Small bilateral subacute hemothoraces with bibasilar atelectasis  3   Spiculated left breast mass measuring up to 2 9 cm  Recommend correlation with mammography  Abdomen/pelvis: 1  Acute right retroperitoneal hematoma expanding the right iliopsoas and iliacus musculature  Small amount of subacute hemoperitoneum in the right paracolic gutter and pelvis 2  Cholelithiasis with edematous wall thickening and pericholecystic fluid which could represent acute cholecystitis in the appropriate clinical scenario  Intra-axial hepatic biliary duct dilatation without evidence of obstructing stone  I personally discussed this study with Dao Weiss on 2/28/2022 at 4:24 PM  Workstation performed: [id number]     XR chest portable ICU    Result Date: 3/2/2022  Impression: Evidence of new pulmonary edema compared to 2/26/2022  Pleural effusions  Workstation performed: [id number]     CTA chest pe study    Result Date: 3/2/2022  Impression: No definite acute pulmonary emboli but interpretation is compromised by respiratory motion and small emboli can be obscured  Patchy new bilateral upper lobe alveolar nodules compatible with bronchopneumonia  Aspiration cannot be excluded  Stable large prevertebral hematoma in the lower neck and superior mediastinum  Acute nondisplaced anterolateral left 3rd rib fracture, not visible previously  Spiculated lesion in the left breast   While this could be a hematoma due to the adjacent rib fracture, follow-up is needed with outpatient mammography to exclude malignancy  Persistent small effusions and partial atelectasis of the lower lobes   This study was marked for immediate notification and follow-up  Workstation performed: [id number]     CT recon (no charge)    Result Date: 2/26/2022  Impression: Acute mildly displaced left nasal bone fractures  Otherwise no facial fracture identified  Hematoma anterior to the frontal bone in the midline tracks inferiorly into the preseptal soft tissues, left greater than right and into the left cheek  Workstation performed: [id number]       EKG, Pathology, and Other Studies: I have personally reviewed pertinent reports        VTE Pharmacologic Prophylaxis: Sequential compression device (Venodyne)  and Heparin    VTE Mechanical Prophylaxis: sequential compression device

## 2022-03-03 NOTE — PROGRESS NOTES
Vancomycin IV Pharmacy-to-Dose Consultation    Carisa Muñiz is a 64 y o  female who is currently receiving Vancomycin IV with management by the Pharmacy Consult service for the treatment of Pneumonia  Assessment/Plan:    The patient's chart was reviewed  Renal function is stable  There are no signs or symptoms of nephrotoxicity and/or infusion reactions documented  Based on todays assessment, will continue current vancomycin (Day # 2) dosing of 1500 mg IV q24h, with a plan for trough to be drawn at 1430 on 3/4 (prior to the 3rd dose)  Pharmacy will continue to follow closely for s/sx of nephrotoxicity, infusion reactions and appropriateness of therapy  BMP and CBC will be ordered per protocol  We will continue to follow the patients culture results and clinical progress daily        Keesha Raymundo, PharmD, [address] [facility] Clinical Pharmacist  [phone]

## 2022-03-03 NOTE — ASSESSMENT & PLAN NOTE
Bilateral laminae and spinous process fractures of C5 and C6   · S/p mechanical trip and fall with hyperextension injury into washing machine  · Large anterior cervical spine prevertebral hematoma with active extravasation s/p angiogram on presentation  · Intubated for airway protection in setting of large hematoma  · Current exam no-focal  Intubated but nodding yes/no  In vista collar  Denies neck pain or back pain  Moving all extremities  Denies numbness or weakness  Imaging:  · Cerebral angiogram 2/26/2022: negative, no active identified hemorrhage  · MRI cervical spine wo, 2/26/2022: Slight widening of the anterior aspect of the C6-7 disc space with rupture of the anterior longitudinal ligament  The posterior longitudinal ligament and ligamentum flavum appear intact  Patient has known posterior element fractures at C5 and C6 incompletely evaluated on this examination  There is mild interspinous ligament injury in the posterior soft tissues extending from C2 through C5  Large prevertebral space hematoma anterior to the cervical spine as described above  · CT cervical spine wo, 2/26/2022: Extensive multilevel degenerative disc disease as above  Nondisplaced fractures in the lamina bilaterally and spinous process at C5 and C6  Displaced ossific focus anterior to the C6-7 disc space suggesting avulsed displaced anterior osteophyte  Large soft tissue abnormality anterior to the C6 and C7 likely represent prevertebral hematoma  Plan:  · Continue to monitor neuro exam closely  · Transfuse as needed for active extravasation - Hgb 9 4 s/p 3 units PRBC  · Noted 2/28 with small retroperitoneal bleed, no intervention  · Maintain in cervical brace at all times  · Hold PT/OT secondary to instability  · Wean to extubate as able  · Maintain MAP > 85 mmHg  · Anticipate surgical intervention (C3/C4-T1 posterior decompression/fusion) once hemodynamically stable, TBD, potentially Friday 3/4 with Dr Aleks Antoine   Will phone family to discuss  · DVT ppx: SCDs, heparin SQ  Neurosurgery will continue to follow closely  Please call with any questions or concerns

## 2022-03-03 NOTE — PROGRESS NOTES
Daily Progress Note - [address] 64 y o  female MRN: [id number]  Unit/Bed#: ICU 09 Encounter: [id number]        ----------------------------------------------------------------------------------------  HPI/24hr events: Spiked fever of 102 6 yesterday  Increasing O2 requirements on ventilator, requiring 80%/10 PEEP  CTA PE study negative for large PE though poor study  Cultures sent and abx started  Labile blood pressures intermittently requiring either ephraim or cardene, this morning on ephraim at 70  FiO2 weaned to 60%, PEEP to 8    ---------------------------------------------------------------------------------------  SUBJECTIVE  Unable to provide    Review of Systems  Review of systems was unable to be performed secondary to ETT  ---------------------------------------------------------------------------------------  Assessment and Plan:    Neuro:    Non-displaced fractures of C5-C6 lamina and spinous processes, C2-C5 interspinous ligament strain with prevertebral hematoma  o Frequent neuro exams, maintain cervical brace  Goal MAP > 85  Plan for surgical intervention Friday with neurosurgery, appreciate recommendations   Analgesia/sedation  o Propofol 40  o Fentanyl 100   Daily CAM-ICU, delirium precautions  Regulate sleep/wake cycle      CV:    Hypertension  o Holding home medications  Goal MAP > 85, SBP < 160  Intermittently requiring cardene/neosynephrine, currently on ephraim  o Echo pending      Pulm:   Acute hypoxic respiratory failure, airway compromise  o Secondary to large prevertebral hematoma, 6 0 ETT in place  AC/VC 14/400/60%/8  Wean FiO2 as able for goal SpO2 > 92%  VAP bundle  Can trial daily SBT, but no plans to extubate until after the OR  GI:    Pepcid for GI ppx      :    MILIVA  o Improved  Trend strict I/Os         F/E/N:    No continuous fluids   Replete electrolytes as needed for goal Mag > 2 0, Phos >3 0, K >4 0   Tube feeds at goal      Heme/Onc:    SQH/SCDs for ppx   CTA PE study negative for PE, b/l LE duplex negative for DVT      Endo:    No active issues  Goal -180      ID:    Probable PNA  o F/u blood and sputum cultures  COVID negative  Cefepime/vancomycin day 2, MRSA swab pending  Trend fever curve/white count      MSK/Skin:    Nondisplaced L 3rd rib fracture  o Incidentally noted on CTA chest 3/2  Pain control   Frequent turns/repositioning, offloading    Patient appropriate for transfer out of the ICU today?: No  Disposition: Continue Critical Care   Code Status: Level 1 - Full Code  ---------------------------------------------------------------------------------------  ICU CORE MEASURES    Prophylaxis   VTE Pharmacologic Prophylaxis: Heparin  VTE Mechanical Prophylaxis: sequential compression device  Stress Ulcer Prophylaxis: Prophylaxis Not Indicated     ABCDE Protocol (if indicated)  Plan to perform spontaneous awakening trial today? Yes  Plan to perform spontaneous breathing trial today? Yes  Obvious barriers to extubation? Yes  CAM-ICU: Negative    Invasive Devices Review  Invasive Devices  Report    Peripheral Intravenous Line            Peripheral IV 03/02/22 Right Antecubital <1 day    Peripheral IV 03/03/22 Distal;Right;Ventral (anterior) Forearm <1 day    Peripheral IV 03/03/22 Dorsal (posterior); Left Hand <1 day          Arterial Line            Arterial Line 02/26/22 Left Radial 4 days          Drain            NG/OG/Enteral Tube Orogastric 16 Fr Left mouth 4 days    Urethral Catheter Temperature probe 16 Fr  4 days          Airway            ETT  Cuffed 6 mm -- days              Can any invasive devices be discontinued today?  No  ---------------------------------------------------------------------------------------  OBJECTIVE    Vitals   Vitals:    03/03/22 0306 03/03/22 0400 03/03/22 0500 03/03/22 0600   BP:  158/75 148/80 167/79   BP Location:  Right arm     Pulse:  72 70 68   Resp:  16 17 18   Temp:  98 6 °F (37 °C) 98 6 °F (37 °C) 98 6 °F (37 °C)   TempSrc:  Probe     SpO2: 97% 98% 98% 98%   Weight:       Height:         Temp (24hrs), Av 9 °F (37 7 °C), Min:97 9 °F (36 6 °C), Max:102 6 °F (39 2 °C)  Current: Temperature: 98 6 °F (37 °C)    Respiratory:  SpO2: SpO2: 98 %  Nasal Cannula O2 Flow Rate (L/min): 2 L/min    Invasive/non-invasive ventilation settings   Respiratory  Report   Lab Data (Last 4 hours)    None         O2/Vent Data (Last 4 hours)    None                Physical Exam  Vitals and nursing note reviewed  Constitutional:       General: She is not in acute distress  HENT:      Head: Normocephalic and atraumatic  Comments: Facial ecchymosis  Eyes:      Pupils: Pupils are equal, round, and reactive to light  Cardiovascular:      Rate and Rhythm: Normal rate and regular rhythm  Pulses:           Radial pulses are 2+ on the right side and 2+ on the left side  Dorsalis pedis pulses are 2+ on the right side and 2+ on the left side  Heart sounds: Normal heart sounds  Heart sounds not distant  No murmur heard  No friction rub  No gallop  Pulmonary:      Effort: Pulmonary effort is normal       Breath sounds: Normal breath sounds  No decreased breath sounds, wheezing, rhonchi or rales  Abdominal:      General: There is no distension  Palpations: Abdomen is soft  Tenderness: There is no abdominal tenderness  Musculoskeletal:      Right lower leg: Edema present  Left lower leg: Edema present  Skin:     General: Skin is warm and dry  Neurological:      Mental Status: She is lethargic        Comments: Sedated             Laboratory and Diagnostics:  Results from last 7 days   Lab Units 22  0506 22  1355 22  0404 22  0517 22  2354 22  1801 22  1304 22  0518 22  0518 22  1147 22  0450 22  1531 22  1531   WBC Thousand/uL 17 27* 10 62* 9 29 8 70  --   --   --   --  9 91  --  9 64  --  6 67   HEMOGLOBIN g/dL 9 4* 10 1* 9 0* 9 2* 9 4* 9 5* 6 7*   < > 6 5*   < > 6 8*   < > 9 2*   HEMATOCRIT % 27 9* 29 7* 26 6* 26 4* 26 1* 27 5* 20 2*   < > 20 0*   < > 21 2*   < > 29 5*   PLATELETS Thousands/uL 141* 138* 125* 105*  --   --   --   --  107*  --  140*  --  173   NEUTROS PCT %  --   --  82* 87*  --   --   --   --   --   --  86*  --  65   BANDS PCT % 7 2  --   --   --   --   --   --   --   --   --   --   --    MONOS PCT %  --   --  6 5  --   --   --   --   --   --  3*  --  6   MONO PCT % 17* 2*  --   --   --   --   --   --   --   --   --   --   --     < > = values in this interval not displayed  Results from last 7 days   Lab Units 03/03/22  0506 03/02/22  1355 03/02/22  0404 03/01/22  2258 03/01/22  0517 02/28/22  2354 02/28/22  1801 02/27/22  0450 02/26/22  1531   SODIUM mmol/L 142 140 139 140 137 138 137   < > 138   POTASSIUM mmol/L 3 9 3 6 3 9 3 9 4 0 4 1 4 1   < > 3 4*   CHLORIDE mmol/L 114* 112* 111* 111* 109* 110* 109*   < > 106   CO2 mmol/L 26 25 27 27 25 24 23   < > 27   ANION GAP mmol/L 2* 3* 1* 2* 3* 4 5   < > 5   BUN mg/dL 41* 45* 48* 48* 52* 54* 51*   < > 31*   CREATININE mg/dL 0 94 1 07 0 95 1 01 1 14 1 33* 1 35*   < > 1 00   CALCIUM mg/dL 9 2 8 9 8 5 8 5 8 4 8 4 8 4   < > 8 6   GLUCOSE RANDOM mg/dL 142* 130 128 124 135 127 135   < > 104   ALT U/L  --   --   --   --   --   --   --   --  63   AST U/L  --   --   --   --   --   --   --   --  32   ALK PHOS U/L  --   --   --   --   --   --   --   --  98   ALBUMIN g/dL  --   --   --   --   --   --   --   --  2 2*   TOTAL BILIRUBIN mg/dL  --   --   --   --   --   --   --   --  0 43    < > = values in this interval not displayed       Results from last 7 days   Lab Units 03/03/22  0506 03/02/22  0404 03/01/22  2258 02/28/22  0518 02/27/22  0450   MAGNESIUM mg/dL 2 2 2 3 2 4 2 0 1 6   PHOSPHORUS mg/dL 3 0 2 8 2 9 5 9* 6 1*      Results from last 7 days   Lab Units 02/26/22  1545   INR  1 12   PTT seconds 28          Results from last 7 days   Lab Units 03/02/22  1840 03/02/22  1355   LACTIC ACID mmol/L 1 9 2 4*     ABG:  Results from last 7 days   Lab Units 03/02/22  2358 03/02/22  1358 03/02/22  1358   PH ART  7 406   < > 7 420   PCO2 ART mm Hg 40 5   < > 39 7   PO2 ART mm Hg 92 9   < > 83 1   HCO3 ART mmol/L 24 9   < > 25 2   BASE EXC ART mmol/L 0 2   < > 0 7   ABG SOURCE   --   --  Line, Arterial    < > = values in this interval not displayed  VBG:  Results from last 7 days   Lab Units 03/02/22  1358   ABG SOURCE  Line, Arterial       Imaging: 3/2 CTA PE study: No definite acute pulmonary emboli but interpretation is compromised by respiratory motion and small emboli can be obscured      Patchy new bilateral upper lobe alveolar nodules compatible with bronchopneumonia  Aspiration cannot be excluded      Stable large prevertebral hematoma in the lower neck and superior mediastinum      Acute nondisplaced anterolateral left 3rd rib fracture, not visible previously      Spiculated lesion in the left breast   While this could be a hematoma due to the adjacent rib fracture, follow-up is needed with outpatient mammography to exclude malignancy      Persistent small effusions and partial atelectasis of the lower lobes  I have personally reviewed pertinent reports  Intake and Output  I/O       03/01 0701 03/02 0700 03/02 0701 03/03 0700    I V  (mL/kg) 3130 3 (42 9) 2113 7 (29)    NG/     IV Piggyback 250 616 7    Feedings 230 1040    Total Intake(mL/kg) 3730 3 (51 1) 3770 4 (51 6)    Urine (mL/kg/hr) 1700 (1) 1625 (0 9)    Total Output 1700 1625    Net +2030 3 +2145 4              UOP: 67 ml/hr     Height and Weights   Height: 5' 5" (165 1 cm)     Body mass index is 26 78 kg/m²    Weight (last 2 days)     None            Nutrition       Diet Orders   (From admission, onward)             Start     Ordered    03/01/22 1457  Diet Enteral/Parenteral; Tube Feeding No Oral Diet; Jevity 1 2 Adilson; Continuous; 65; Prosource Protein Liquid - Two Packets  Diet effective now Comments: Start at 8 and advance by 10 every 4 hours to goal   References:    Nutrtion Support Algorithm Enteral vs  Parenteral   Question Answer Comment   Diet Type Enteral/Parenteral    Enteral/Parenteral Tube Feeding No Oral Diet    Tube Feeding Formula: Jevity 1 2 Adilson    Bolus/Cyclic/Continuous Continuous    Tube Feeding Goal Rate (mL/hr): 65    Prosource Protein Liquid - No Carb Prosource Protein Liquid - Two Packets    RD to adjust diet per protocol? No        03/01/22 5797              TF currently running at 65 ml/hr with a goal of 65 ml/hr   Formula: Jevity 1 2      Active Medications  Scheduled Meds:  Current Facility-Administered Medications   Medication Dose Route Frequency Provider Last Rate    acetaminophen  650 mg Oral Q6H PRN Marcey Oppenheim, PA-C      cefepime  2,000 mg Intravenous Q12H Marcey Oppenheim, PA-C Stopped (03/03/22 0245)    chlorhexidine  15 mL Mouth/Throat Q12H [facility] Celso Hughes MD      fentaNYL  75 mcg/hr Intravenous Continuous Marcey Oppenheim, PA-C 100 mcg/hr (03/02/22 2341)    fentanyl citrate (PF)  50 mcg Intravenous Q1H PRN Russella Cola      heparin (porcine)  5,000 Units Subcutaneous Q8H [facility] Aviva Leach DO      hydrALAZINE  10 mg Intravenous Q6H PRN ETIENNE Velazquez      metoclopramide  10 mg Intravenous Once PRN Burke Vegas CRNA      niCARdipine  1-15 mg/hr Intravenous Titrated Marcey Oppenheim, PA-C Stopped (03/02/22 1514)    phenylephine   mcg/min Intravenous Titrated Rafael Marques MD 60 mcg/min (03/03/22 0646)    propofol  5-50 mcg/kg/min Intravenous Titrated Johnson Jones MD 40 mcg/kg/min (03/03/22 0021)    vancomycin  20 mg/kg Intravenous Q24H Marcey Oppenheim, PA-C       Continuous Infusions:  fentaNYL, 75 mcg/hr, Last Rate: 100 mcg/hr (03/02/22 2341)  niCARdipine, 1-15 mg/hr, Last Rate: Stopped (03/02/22 1514)  phenylephine,  mcg/min, Last Rate: 60 mcg/min (03/03/22 0646)  propofol, 5-50 mcg/kg/min, Last Rate: 40 mcg/kg/min (03/03/22 0021)      PRN Meds:   acetaminophen, 650 mg, Q6H PRN  fentanyl citrate (PF), 50 mcg, Q1H PRN  hydrALAZINE, 10 mg, Q6H PRN  metoclopramide, 10 mg, Once PRN        Allergies   No Known Allergies  ---------------------------------------------------------------------------------------  Advance Directive and Living Will:      Power of :    POLST:    ---------------------------------------------------------------------------------------  Care Time Delivered:   Upon my evaluation, this patient had a high probability of imminent or life-threatening deterioration due to acute respiratory failure, which required my direct attention, intervention, and personal management  I have personally provided 34 minutes (0630 to 0710) of critical care time, exclusive of procedures, teaching, family meetings, and any prior time recorded by providers other than myself       Chun Patel PA-C

## 2022-03-03 NOTE — UTILIZATION REVIEW
Continued Stay Review    Date: 3/3/22                          Current Patient Class: inpatient  Current Level of Care: ICU    HPI:76 y o  female initially admitted on 2/26/22     Assessment/Plan:   Non-displaced fractures of C5-C6 lamina and spinous processes, C2-C5 interspinous ligament strain with prevertebral hematoma  Remains intubated & sedated, nods head yes & no, moving all extremities to command off sedation  Continue frequent neuro exams, maintain cervical brace  Goal MAP > 85  Anticipate surgical intervention (C3/C4-T1 posterior decompression/fusion) once hemodynamically stable, TBD, potentially Friday 3/4  HPI/24hr events: Spiked fever of 102 6 yesterday  Increasing O2 requirements on ventilator, requiring 80%/10 PEEP  CTA PE study negative for large PE though poor study  Cultures sent and abx started  Labile blood pressures intermittently requiring either ephraim or cardene, this morning on ephraim at 70  FiO2 weaned to 60%, PEEP to 8       Neurologic:   Mental status: GCS 10T (E3, V1T, M6)  Cranial nerves: grossly intact (Cranial nerves II-XII)  Sensory: normal to LT  Motor: moving all extremities without focal weakness  Reflexes: 2+ and symmetric, no Ryder's or clonus    Vital Signs:   03/03/22 1224 100 6 °F (38 1 °C) Abnormal  102 25 Abnormal  142/67 92 197/66 96 mmHg 96 % -- Ventilator --   03/03/22 1125 99 7 °F (37 6 °C) 102 21 145/67 90 188/68 98 mmHg 96 % -- Ventilator --   03/03/22 1115 99 7 °F (37 6 °C) 94 22 135/69 91 178/64 94 mmHg 96 % -- Ventilator --   03/03/22 1100 99 7 °F (37 6 °C) 98 20 138/64 85 174/64 92 mmHg 96 % -- Ventilator --     Hemodynamic Monitoring: MAP: Arterial Line MAP (mmHg): 106 mmHg    Diet Enteral/Parenteral; Tube Feeding No Oral Diet; Jevity 1 2 Adilson; Continuous; 65; Prosource Protein Liquid - Two Packets    Pertinent Labs/Diagnostic Results:   VAS lower limb venous duplex study, complete bilateral   (03/02 2207)   RIGHT LOWER LIMB:  No evidence of acute or chronic deep vein thrombosis  No evidence of superficial thrombophlebitis noted  Doppler evaluation shows a normal response to augmentation maneuvers  Popliteal, posterior tibial and anterior tibial arterial Doppler waveforms are triphasic  LEFT LOWER LIMB:  No evidence of acute or chronic deep vein thrombosis  No evidence of superficial thrombophlebitis noted  Doppler evaluation shows a normal response to augmentation maneuvers  Popliteal, posterior tibial and anterior tibial arterial Doppler waveforms are triphasic  CTA chest pe study   (03/02 1453)      No definite acute pulmonary emboli but interpretation is compromised by respiratory motion and small emboli can be obscured  Patchy new bilateral upper lobe alveolar nodules compatible with bronchopneumonia  Aspiration cannot be excluded  Stable large prevertebral hematoma in the lower neck and superior mediastinum  Acute nondisplaced anterolateral left 3rd rib fracture, not visible previously  Spiculated lesion in the left breast   While this could be a hematoma due to the adjacent rib fracture, follow-up is needed with outpatient mammography to exclude malignancy  Persistent small effusions and partial atelectasis of the lower lobes  XR chest portable ICU   (03/02 1413)   Evidence of new pulmonary edema compared to 2/26/2022  Pleural effusions  CTA head and neck w wo contrast   (02/28 1528)      No acute intracranial abnormality  Persistent large prevertebral soft tissue hematoma with extension inferiorly into posterior mediastinum  No active contrast extravasation identified, resolved since most recent CTA neck  Negative CTA head and neck for large vessel occlusion, dissection,    aneurysm, or high-grade stenosis  Unchanged acute small anteriorly displaced avulsed fracture fragment with widened anterior disc space at C6-C7 level, compatible with known anterior longitudinal ligament rupture          Unchanged acute nondisplaced fractures of bilateral lamina and spinous process at C5 and C6  Unchanged small subgaleal hematoma in bilateral frontal regions  Please see same-day CT chest abdomen pelvis with contrast for further evaluation  CT chest abdomen pelvis w contrast   (02/28 1626)      Chest:      1  Decreased size of a prevertebral and mediastinal hematoma extending outside of the field of view into the neck  See concurrent CTA head/neck for further evaluation  There is diminished mass effect on the trachea  2   Small bilateral subacute hemothoraces with bibasilar atelectasis  3   Spiculated left breast mass measuring up to 2 9 cm  Recommend correlation with mammography  Abdomen/pelvis:      1  Acute right retroperitoneal hematoma expanding the right iliopsoas and iliacus musculature  Small amount of subacute hemoperitoneum in the right paracolic gutter and pelvis   2  Cholelithiasis with edematous wall thickening and pericholecystic fluid which could represent acute cholecystitis in the appropriate clinical scenario  Intra-axial hepatic biliary duct dilatation without evidence of obstructing stone  MRI cervical spine wo contrast   (02/27 1757)      Slight widening of the anterior aspect of the C6-7 disc space with rupture of the anterior longitudinal ligament  The posterior longitudinal ligament and ligamentum flavum appear intact  Patient has known posterior element fractures at C5 and C6 incompletely evaluated on this examination  There is mild interspinous ligament injury in the posterior soft tissues extending from C2 through C5  Large prevertebral space hematoma anterior to the cervical spine as described above  No cord compression or abnormal cord signal identified  CT recon    (02/26 1933)      Acute mildly displaced left nasal bone fractures  Otherwise no facial fracture identified        Hematoma anterior to the frontal bone in the midline tracks inferiorly into the preseptal soft tissues, left greater than right and into the left cheek  CTA neck w wo contrast   (02/26 1929)      There is an enlarging, large prevertebral hematoma within the cervical spine tracking inferiorly into the upper mediastinum  This is displacing the trachea anteriorly and resulting in moderate tracheal narrowing at the level of the tip of the    endotracheal tube  There is brisk active arterial extravasation of contrast noted within the prevertebral hematoma immediately anterior to the C6-7 disc space, corresponding to a site of active bleeding  There is now widening of the disc space of C6-7 anteriorly, not present on the initial CT scan performed approximately 4 hours ago consistent with anterior longitudinal ligament disruption  Small avulsion fracture suspected  Stable posterior element    fractures involving C5 and C6  No subluxation  XR chest portable   (02/27 1003)      Endotracheal tube as above  Widening of the right paratracheal stripe indicative of underlying adenopathy or mass  Right lower lobe subsegmental atelectasis  Results from last 7 days   Lab Units 03/02/22  1351   SARS-COV-2  Negative     Results from last 7 days   Lab Units 03/03/22  0506 03/02/22  1355 03/02/22  0404 03/01/22  0517 03/01/22  0517 02/28/22  2354 02/28/22  0518 02/27/22  1147 02/27/22  0450   WBC Thousand/uL 17 27* 10 62* 9 29   < > 8 70  --    < >   < > 9 64   HEMOGLOBIN g/dL 9 4* 10 1* 9 0*  --  9 2* 9 4*  --    < > 6 8*   HEMATOCRIT % 27 9* 29 7* 26 6*  --  26 4* 26 1*  --    < > 21 2*   PLATELETS Thousands/uL 141* 138* 125*   < > 105*  --    < >   < > 140*   NEUTROS ABS Thousands/µL  --   --  7 60  --  7 56  --   --   --  8 36*   BANDS PCT % 7 2  --   --   --   --   --   --   --     < > = values in this interval not displayed       Results from last 7 days   Lab Units 03/03/22  0506 03/02/22  1355 03/02/22  0404 03/01/22  2251 03/01/22  0517 02/28/22  1801 02/28/22  0518 02/27/22  0450 02/27/22  0450   SODIUM mmol/L 142 140 139 140 137   < > 136   < > 137   POTASSIUM mmol/L 3 9 3 6 3 9 3 9 4 0   < > 4 4   < > 3 4*   CHLORIDE mmol/L 114* 112* 111* 111* 109*   < > 111*   < > 110*   CO2 mmol/L 26 25 27 27 25   < > 22   < > 24   ANION GAP mmol/L 2* 3* 1* 2* 3*   < > 3*   < > 3*   BUN mg/dL 41* 45* 48* 48* 52*   < > 54*   < > 29*   CREATININE mg/dL 0 94 1 07 0 95 1 01 1 14   < > 1 74*   < > 1 04   EGFR ml/min/1 73sq m 58 49 57 53 46   < > 27   < > 51   CALCIUM mg/dL 9 2 8 9 8 5 8 5 8 4   < > 8 6   < > 8 8   CALCIUM, IONIZED mmol/L 1 27  --  1 20 1 22  --   --   --   --   --    MAGNESIUM mg/dL 2 2  --  2 3 2 4  --   --  2 0  --  1 6   PHOSPHORUS mg/dL 3 0  --  2 8 2 9  --   --  5 9*  --  6 1*    < > = values in this interval not displayed       Results from last 7 days   Lab Units 02/26/22  1531   AST U/L 32   ALT U/L 63   ALK PHOS U/L 98   TOTAL PROTEIN g/dL 11 6*   ALBUMIN g/dL 2 2*   TOTAL BILIRUBIN mg/dL 0 43     Results from last 7 days   Lab Units 03/02/22  0556 03/02/22  0022 03/01/22  1804 03/01/22  1152 02/28/22  2317 02/28/22  1720 02/28/22  1142 02/28/22  0001 02/27/22  1715 02/27/22  0032   POC GLUCOSE mg/dl 122 115 121 130 117 117 126 129 132 118     Results from last 7 days   Lab Units 03/03/22  0506 03/02/22  1355 03/02/22  0404 03/01/22  2258 03/01/22  0517 02/28/22  2354 02/28/22  1801 02/28/22  0518 02/27/22  0450 02/26/22  1531   GLUCOSE RANDOM mg/dL 142* 130 128 124 135 127 135 140 163* 104     Results from last 7 days   Lab Units 03/02/22  2358 03/02/22  1358 02/27/22  0748   PH ART  7 406 7 420 7 405   PCO2 ART mm Hg 40 5 39 7 37 2   PO2 ART mm Hg 92 9 83 1 139 8*   HCO3 ART mmol/L 24 9 25 2 22 8   BASE EXC ART mmol/L 0 2 0 7 -1 7   O2 CONTENT ART mL/dL 13 7* 14 6* 11 2*   O2 HGB, ARTERIAL % 95 5 95 2 98 4*   ABG SOURCE   --  Line, Arterial Line, Arterial     Results from last 7 days   Lab Units 03/03/22  0359 03/03/22  0214 03/03/22  0028   HS TNI 0HR ng/L  --   --  101*   HS TNI 2HR ng/L  --  86*  --    HSTNI D2 ng/L  --  -15  --    HS TNI 4HR ng/L 80*  --   --    HSTNI D4 ng/L -21  --   --      Results from last 7 days   Lab Units 02/26/22  1545   PROTIME seconds 14 2   INR  1 12   PTT seconds 28     Results from last 7 days   Lab Units 03/03/22  0913   PROCALCITONIN ng/ml 11 05*     Results from last 7 days   Lab Units 03/02/22  1840 03/02/22  1355   LACTIC ACID mmol/L 1 9 2 4*     Results from last 7 days   Lab Units 03/03/22  0024   NT-PRO BNP pg/mL 11,374*     Results from last 7 days   Lab Units 03/02/22  1351   INFLUENZA A PCR  Negative   INFLUENZA B PCR  Negative   RSV PCR  Negative     Results from last 7 days   Lab Units 03/02/22  1352   SPUTUM CULTURE  Culture results to follow  GRAM STAIN RESULT  4+ Polys*  Rare Gram positive rods*  Rare Gram negative rods*  Rare Gram positive cocci in pairs*     Medications:   cefepime, 2,000 mg, Intravenous, Q12H  chlorhexidine, 15 mL, Mouth/Throat, Q12H RAMÍREZ  heparin (porcine), 5,000 Units, Subcutaneous, Q8H RAMÍREZ  vancomycin, 20 mg/kg, Intravenous, Q24H    Continuous IV Infusions:  fentaNYL, 75 mcg/hr, Intravenous, Continuous  niCARdipine, 1-15 mg/hr, Intravenous, Titrated  phenylephine,  mcg/min, Intravenous, Titrated  propofol, 5-50 mcg/kg/min, Intravenous, Titrated    PRN Meds:  acetaminophen, 650 mg, Oral, Q6H PRN  fentanyl citrate (PF), 50 mcg, Intravenous, Q1H PRN  hydrALAZINE, 10 mg, Intravenous, Q6H PRN  metoclopramide, 10 mg, Intravenous, Once PRN    Discharge Plan: d    Network Utilization Review Department  ATTENTION: Please call with any questions or concerns to [phone] and carefully listen to the prompts so that you are directed to the right person   All voicemails are confidential   Mary Bee all requests for admission clinical reviews, approved or denied determinations and any other requests to dedicated fax number below belonging to the campus where the patient is receiving treatment   List of dedicated fax numbers for the Facilities:  [address] DENIALS (Administrative/Medical Necessity) [phone]   [address] (Maternity/NICU/Pediatrics) [phone] [address]  [address] [place] [address] [address] [place] [address] [address] [facility] [place] [address] [phone]

## 2022-03-04 PROBLEM — J18.9 PNEUMONIA: Status: ACTIVE | Noted: 2022-01-01

## 2022-03-04 NOTE — CONSULTS
Vancomycin IV Pharmacy-to-Dose Consultation    Argelia Schulz is a 64 y o  female who was receiving Vancomycin IV with management by the Pharmacy Consult service for treatment of Pneumonia  The patients Vancomycin therapy has been discontinued  Thank you for allowing us to take part in this patient's care  Pharmacy will sign-off now; please call or re-consult if there are any questions          Whit Yo, PharmD, [address] Clinical Pharmacist  [phone]

## 2022-03-04 NOTE — ASSESSMENT & PLAN NOTE
+ Serratia and H  Flu in sputum cx from 3/2  Tmax 102 4 on 3/3  Currently on ceftriaxone 1 gram daily

## 2022-03-04 NOTE — PROGRESS NOTES
03/04/22 1500   Clinical Encounter Type   Visited With Patient   Routine Visit Follow-up   Sacramental Encounters   Sacrament of Sick-Anointing Anointed   Sacrament Other Other (Comment)  ([facility] [place] by Fr Coker)

## 2022-03-04 NOTE — NUTRITION
03/04/22 1233   Recommendations/Interventions   Summary Pt receiving 459 kcal's from propofol  Please change EN regimen to: Vital 1 2cal @ 60ml/hr continuous via OGT  Initiate TF at 20ml/hr advance 10ml q8 hours to goal   Provides:  Total Volume: 1440ml, Total Kcal's:1728, Total Protein:108g, Free Water from EN:1168ml/day

## 2022-03-04 NOTE — CASE MANAGEMENT
Case Management Discharge Planning Note    Patient name Mae Bhatt  Location ICU 09/ICU 09 MRN [id number]  : 1945 Date 3/4/2022       Current Admission Date: 2022  Current Admission Diagnosis:Cervical spine fracture [facility])   Patient Active Problem List    Diagnosis Date Noted    Pneumonia 2022    Acute respiratory failure ([facility] [address] ) 2022    Acute blood loss anemia 2022    MILVIA (acute kidney injury) ([facility] [address] ) 2022    Nasal bone fractures 2022    [facility]    Hematoma of neck 2022    BMI 29 0-29 9,adult 2020    Medicare annual wellness visit, initial 2019    Essential hypertension 2016      LOS (days): 6  Geometric Mean LOS (GMLOS) (days): 4 30  Days to GMLOS:-1 5     OBJECTIVE:  Risk of Unplanned Readmission Score: 13         Current admission status: Inpatient   Preferred Pharmacy:   [id number] [place] - [address]   [address] [place] [address]  Phone: [phone] Fax: [phone]    Primary Care Provider: Janes Lott MD    Primary Insurance: [address]  Secondary Insurance:     DISCHARGE DETAILS:       Pt remains intubated at this time  Plan is for eventual surgical intervention when appropriate  CM will continue to follow for potential recs

## 2022-03-04 NOTE — ASSESSMENT & PLAN NOTE
Bilateral laminae and spinous process fractures of C5 and C6   · S/p mechanical trip and fall with hyperextension injury into washing machine  · Large anterior cervical spine prevertebral hematoma with active extravasation s/p angiogram on presentation  · Intubated for airway protection in setting of large hematoma  · Current exam no-focal  Intubated but nodding yes/no off sedation  In vista collar  Denies neck pain or back pain  Moving all extremities  Denies numbness or weakness  Imaging:  · Cerebral angiogram 2/26/2022: negative, no active identified hemorrhage  · MRI cervical spine wo, 2/26/2022: Slight widening of the anterior aspect of the C6-7 disc space with rupture of the anterior longitudinal ligament  The posterior longitudinal ligament and ligamentum flavum appear intact  Patient has known posterior element fractures at C5 and C6 incompletely evaluated on this examination  There is mild interspinous ligament injury in the posterior soft tissues extending from C2 through C5  Large prevertebral space hematoma anterior to the cervical spine as described above  · CT cervical spine wo, 2/26/2022: Extensive multilevel degenerative disc disease as above  Nondisplaced fractures in the lamina bilaterally and spinous process at C5 and C6  Displaced ossific focus anterior to the C6-7 disc space suggesting avulsed displaced anterior osteophyte  Large soft tissue abnormality anterior to the C6 and C7 likely represent prevertebral hematoma  Plan:  · Continue to monitor neuro exam closely  · Transfuse as needed for active extravasation - Hgb 8 3 s/p 3 units PRBC at beginning of week  · Noted 2/28 with small retroperitoneal bleed, no intervention  · Maintain in cervical brace at all times  · Hold PT/OT secondary to instability  · Wean to extubate as able - continues with market tracheal edema  · Maintain MAP > 85 mmHg    · Anticipate surgical intervention (C3/C4-T1 posterior decompression/fusion) once hemodynamically stable, TBD, once completed a few days of abx and infectious profile decreased  Will phone family to discuss  · DVT ppx: SCDs, heparin SQ  Neurosurgery will continue to follow closely  Please call with any questions or concerns

## 2022-03-04 NOTE — PROGRESS NOTES
Daily Progress Note - [address] 64 y o  female MRN: [id number]  Unit/Bed#: ICU 09 Encounter: [id number]        ----------------------------------------------------------------------------------------  HPI/24hr events: No acute overnight events  NPO for OR today  Marcelino at 46    ---------------------------------------------------------------------------------------  SUBJECTIVE  Unable to provide    Review of Systems  Review of systems was unable to be performed secondary to ETT  ---------------------------------------------------------------------------------------  Assessment and Plan:    Neuro:    Non-displaced fractures of C5-C6 lamina and spinous processes, C2-C5 interspinous ligament strain with prevertebral hematoma  o Frequent neuro exams, maintain cervical brace  Goal MAP > 85  Plan for surgical intervention Friday with neurosurgery, appreciate recommendations   Analgesia/sedation  o Propofol 40  o Fentanyl 100   Daily CAM-ICU, delirium precautions  Regulate sleep/wake cycle      CV:    Hypertension  o Holding home medications  Goal MAP > 85, SBP < 160  Intermittently requiring cardene/neosynephrine, currently on marcelino  Pulm:   Acute hypoxic respiratory failure, airway compromise  o Secondary to large prevertebral hematoma, 6 0 ETT in place  AC/VC 14/400/60%/8  Wean FiO2 as able for goal SpO2 > 92%  VAP bundle  Can trial daily SBT, but no plans to extubate until after the OR  GI:    Pepcid for GI ppx      :    MILVIA  o Improved  Trend strict I/Os  F/E/N:    No continuous fluids   Replete electrolytes as needed for goal Mag > 2 0, Phos >3 0, K >4 0   Tube feeds at goal      Heme/Onc:    SQH/SCDs for ppx   CTA PE study negative for PE, b/l LE duplex negative for DVT      Endo:    No active issues  Goal -180      ID:    H  Flu PNA  o Sputum cultures with 2+ H  Flu, 2+ serratia marcescens, speciation pending   Narrow antibiotics as able based on culture data  COVID negative  Cefepime/vancomycin day 3, MRSA swab negative - discontinue vancomycin  Trend fever curve/white count      MSK/Skin:    Nondisplaced L 3rd rib fracture  o Incidentally noted on CTA chest 3/2  Pain control   Frequent turns/repositioning, offloading    Patient appropriate for transfer out of the ICU today?: No  Disposition: Continue Critical Care   Code Status: Level 1 - Full Code  ---------------------------------------------------------------------------------------  ICU CORE MEASURES    Prophylaxis   VTE Pharmacologic Prophylaxis: Heparin  VTE Mechanical Prophylaxis: sequential compression device  Stress Ulcer Prophylaxis: Prophylaxis Not Indicated     ABCDE Protocol (if indicated)  Plan to perform spontaneous awakening trial today? Yes  Plan to perform spontaneous breathing trial today? Yes  Obvious barriers to extubation? Yes  CAM-ICU: Negative    Invasive Devices Review  Invasive Devices  Report    Peripheral Intravenous Line            Peripheral IV 22 Right Antecubital 1 day    Peripheral IV 22 Distal;Right;Ventral (anterior) Forearm 1 day    Peripheral IV 22 Dorsal (posterior); Left Hand 1 day          Arterial Line            Arterial Line 22 Left Radial 5 days          Drain            NG/OG/Enteral Tube Orogastric 16 Fr Left mouth 5 days    Urethral Catheter Temperature probe 16 Fr  5 days          Airway            ETT  Cuffed 6 mm -- days              Can any invasive devices be discontinued today?  No  ---------------------------------------------------------------------------------------  OBJECTIVE    Vitals   Vitals:    22 0200 22 0252 22 0300 22 0400   BP: 158/63  133/60 148/61   BP Location:    Left arm   Pulse: 82  76 74   Resp: (!) 23  (!) 23 20   Temp: 99 7 °F (37 6 °C)  100 °F (37 8 °C) 99 7 °F (37 6 °C)   TempSrc:    Probe   SpO2: 97% 97% 97% 98%   Weight:       Height:         Temp (24hrs), Av 6 °F (38 1 °C), Min:98 6 °F (37 °C), Max:102 4 °F (39 1 °C)  Current: Temperature: 99 7 °F (37 6 °C)    Respiratory:  SpO2: SpO2: 98 %  Nasal Cannula O2 Flow Rate (L/min): 2 L/min    Invasive/non-invasive ventilation settings   Respiratory  Report   Lab Data (Last 4 hours)    None         O2/Vent Data (Last 4 hours)      03/04 0252           Vent Mode AC/VC       Resp Rate (BPM) (BPM) 14       Vt (mL) (mL) 400       FIO2 (%) (%) 60       PEEP (cmH2O) (cmH2O) 8       MV 10 5                   Physical Exam  Vitals and nursing note reviewed  Constitutional:       General: She is not in acute distress  HENT:      Head: Normocephalic and atraumatic  Comments: Facial ecchymosis  Eyes:      Pupils: Pupils are equal, round, and reactive to light  Cardiovascular:      Rate and Rhythm: Normal rate and regular rhythm  Pulses:           Radial pulses are 2+ on the right side and 2+ on the left side  Dorsalis pedis pulses are 2+ on the right side and 2+ on the left side  Heart sounds: Normal heart sounds  Heart sounds not distant  No murmur heard  No friction rub  No gallop  Pulmonary:      Effort: Pulmonary effort is normal       Breath sounds: Normal breath sounds  No decreased breath sounds, wheezing, rhonchi or rales  Abdominal:      General: There is no distension  Palpations: Abdomen is soft  Tenderness: There is no abdominal tenderness  Musculoskeletal:      Right lower leg: Edema present  Left lower leg: Edema present  Skin:     General: Skin is warm and dry  Neurological:      Mental Status: She is lethargic  Comments: Sedated           Laboratory and Diagnostics:  Results from last 7 days   Lab Units 03/03/22  0506 03/02/22  1355 03/02/22  0404 03/01/22  0517 02/28/22  2354 02/28/22  1801 02/28/22  1304 02/28/22  0518 02/28/22  0518 02/27/22  1147 02/27/22  0450 02/26/22  1531 02/26/22  1531   WBC Thousand/uL 17 27* 10 62* 9 29 8 70  --   --   --   --  9 91  --  9 64  --  6 67 HEMOGLOBIN g/dL 9 4* 10 1* 9 0* 9 2* 9 4* 9 5* 6 7*   < > 6 5*   < > 6 8*   < > 9 2*   HEMATOCRIT % 27 9* 29 7* 26 6* 26 4* 26 1* 27 5* 20 2*   < > 20 0*   < > 21 2*   < > 29 5*   PLATELETS Thousands/uL 141* 138* 125* 105*  --   --   --   --  107*  --  140*  --  173   NEUTROS PCT %  --   --  82* 87*  --   --   --   --   --   --  86*  --  65   BANDS PCT % 7 2  --   --   --   --   --   --   --   --   --   --   --    MONOS PCT %  --   --  6 5  --   --   --   --   --   --  3*  --  6   MONO PCT % 17* 2*  --   --   --   --   --   --   --   --   --   --   --     < > = values in this interval not displayed  Results from last 7 days   Lab Units 03/03/22  1423 03/03/22  0506 03/02/22  1355 03/02/22  0404 03/01/22  2258 03/01/22  0517 02/28/22  2354 02/27/22  0450 02/26/22  1531   SODIUM mmol/L 143 142 140 139 140 137 138   < > 138   POTASSIUM mmol/L 3 6 3 9 3 6 3 9 3 9 4 0 4 1   < > 3 4*   CHLORIDE mmol/L 115* 114* 112* 111* 111* 109* 110*   < > 106   CO2 mmol/L 24 26 25 27 27 25 24   < > 27   ANION GAP mmol/L 4 2* 3* 1* 2* 3* 4   < > 5   BUN mg/dL 37* 41* 45* 48* 48* 52* 54*   < > 31*   CREATININE mg/dL 0 90 0 94 1 07 0 95 1 01 1 14 1 33*   < > 1 00   CALCIUM mg/dL 9 0 9 2 8 9 8 5 8 5 8 4 8 4   < > 8 6   GLUCOSE RANDOM mg/dL 162* 142* 130 128 124 135 127   < > 104   ALT U/L  --   --   --   --   --   --   --   --  63   AST U/L  --   --   --   --   --   --   --   --  32   ALK PHOS U/L  --   --   --   --   --   --   --   --  98   ALBUMIN g/dL  --   --   --   --   --   --   --   --  2 2*   TOTAL BILIRUBIN mg/dL  --   --   --   --   --   --   --   --  0 43    < > = values in this interval not displayed       Results from last 7 days   Lab Units 03/03/22  1423 03/03/22  0506 03/02/22  0404 03/01/22  2258 02/28/22  0518 02/27/22  0450   MAGNESIUM mg/dL 2 0 2 2 2 3 2 4 2 0 1 6   PHOSPHORUS mg/dL 2 3 3 0 2 8 2 9 5 9* 6 1*      Results from last 7 days   Lab Units 02/26/22  1545   INR  1 12   PTT seconds 28          Results from last 7 days   Lab Units 03/02/22  1840 03/02/22  1355   LACTIC ACID mmol/L 1 9 2 4*     ABG:  Results from last 7 days   Lab Units 03/02/22  2358 03/02/22  1358 03/02/22  1358   PH ART  7 406   < > 7 420   PCO2 ART mm Hg 40 5   < > 39 7   PO2 ART mm Hg 92 9   < > 83 1   HCO3 ART mmol/L 24 9   < > 25 2   BASE EXC ART mmol/L 0 2   < > 0 7   ABG SOURCE   --   --  Line, Arterial    < > = values in this interval not displayed  VBG:  Results from last 7 days   Lab Units 03/02/22  1358   ABG SOURCE  Line, Arterial       Imaging: CXR pending  I have personally reviewed pertinent reports  Intake and Output  I/O       03/02 0701 03/03 0700 03/03 0701 03/04 0700    I V  (mL/kg) 2113 7 (29) 996 2 (13 7)    NG/GT  65    IV Piggyback 616 7 353 3    Feedings 1040 780    Total Intake(mL/kg) 3770 4 (51 6) 2194 5 (30 2)    Urine (mL/kg/hr) 1625 (0 9) 615 (0 4)    Stool  0    Total Output 1625 615    Net +2145 4 +1579 5          Unmeasured Stool Occurrence  1 x        UOP: 25 ml/hr     Height and Weights   Height: 5' 5" (165 1 cm)     Body mass index is 26 63 kg/m²  Weight (last 2 days)     Date/Time Weight    03/03/22 0715 72 6 (160)            Nutrition       Diet Orders   (From admission, onward)             Start     Ordered    03/04/22 0001  Diet NPO; Sips with meds  Diet effective midnight        References:    Nutrtion Support Algorithm Enteral vs  Parenteral   Question Answer Comment   Diet Type NPO    NPO Except:  Sips with meds        03/03/22 1512                  Active Medications  Scheduled Meds:  Current Facility-Administered Medications   Medication Dose Route Frequency Provider Last Rate    acetaminophen  650 mg Oral Q6H Genesis Colvin PA-C      cefazolin  2,000 mg Intravenous Once [employer], CRNP      cefepime  2,000 mg Intravenous Q12H Genesis Colvin PA-C 2,000 mg (03/04/22 0157)    chlorhexidine  15 mL Mouth/Throat Q12H [facility] Juancarlos Jimenez MD      chlorhexidine  15 mL Swish & Spit Once [employer], CRNP      fentaNYL  100 mcg/hr Intravenous Continuous Presley Veloz  mcg/hr (03/04/22 0111)    fentanyl citrate (PF)  50 mcg Intravenous Q1H PRN Donel Marilyn      hydrALAZINE  10 mg Intravenous Q6H PRN Ana Luisa El, CRNP      metoclopramide  10 mg Intravenous Once PRN Angélica Goodwin CRNA      niCARdipine  1-15 mg/hr Intravenous Titrated Ela Gilbert PA-C Stopped (03/02/22 1514)    phenylephine   mcg/min Intravenous Titrated Presley Veloz MD 50 mcg/min (03/04/22 0045)    propofol  5-50 mcg/kg/min Intravenous Titrated Taty Carrillo MD 40 mcg/kg/min (03/04/22 0111)     Continuous Infusions:  fentaNYL, 100 mcg/hr, Last Rate: 100 mcg/hr (03/04/22 0111)  niCARdipine, 1-15 mg/hr, Last Rate: Stopped (03/02/22 1514)  phenylephine,  mcg/min, Last Rate: 50 mcg/min (03/04/22 0045)  propofol, 5-50 mcg/kg/min, Last Rate: 40 mcg/kg/min (03/04/22 0111)      PRN Meds:   fentanyl citrate (PF), 50 mcg, Q1H PRN  hydrALAZINE, 10 mg, Q6H PRN  metoclopramide, 10 mg, Once PRN        Allergies   No Known Allergies  ---------------------------------------------------------------------------------------  Advance Directive and Living Will:      Power of :    POLST:    ---------------------------------------------------------------------------------------  Care Time Delivered:   No Critical Care time spent     Ela Gilbert PA-C

## 2022-03-04 NOTE — PROGRESS NOTES
Donned appropriate PPE to introduce spiritual care to pt "Amor Pleasure " Amor Pleasure is intubated and resting comfortably  Interfaith blessing offered at bedside  Will follow as requested  If spiritual care is desired, please contact [facility]  via [employer]       Thank you!        03/04/22 7519   Clinical Encounter Type   Visited With Patient   Routine Visit Introduction

## 2022-03-04 NOTE — RESPIRATORY THERAPY NOTE
RT Ventilator Management Note  Eladio Herrera 64 y o  female MRN: [id number]  Unit/Bed#: ICU 09 Encounter: [id number]      Daily Screen       3/4/2022  0832 3/4/2022  0909          Patient safety screen outcome[de-identified] Passed Passed      Spont breathing trial % for 30 min: -- Yes      Spont breathing trial outcome[de-identified] -- Failed      Spont breathing trial reason failed: -- RR > 35 bpm;Oxygen saturation < 88% > than 5 mins      Previous settings resumed: -- Yes      Name of Medical Team Notified[de-identified] -- [facility]              Physical Exam:          Resp Comments: (P) no vent changes today, pt appears comfortable will continue to monitor

## 2022-03-04 NOTE — PLAN OF CARE

## 2022-03-04 NOTE — PLAN OF CARE
Problem: MOBILITY - ADULT  Goal: Maintain or return to baseline ADL function  Description: INTERVENTIONS:  -  Assess patient's ability to carry out ADLs; assess patient's baseline for ADL function and identify physical deficits which impact ability to perform ADLs (bathing, care of mouth/teeth, toileting, grooming, dressing, etc )  - Assess/evaluate cause of self-care deficits   - Assess range of motion  - Assess patient's mobility; develop plan if impaired  - Assess patient's need for assistive devices and provide as appropriate  - Encourage maximum independence but intervene and supervise when necessary  - Involve family in performance of ADLs  - Assess for home care needs following discharge   - Consider OT consult to assist with ADL evaluation and planning for discharge  - Provide patient education as appropriate  Outcome: Progressing  Goal: Maintains/Returns to pre admission functional level  Description: INTERVENTIONS:  - Perform BMAT or MOVE assessment daily    - Set and communicate daily mobility goal to care team and patient/family/caregiver  - Collaborate with rehabilitation services on mobility goals if consulted  - Perform Range of Motion    times a day  - Reposition patient every    hours    - Dangle patient    times a day  - Stand patient    times a day  - Ambulate patient    times a day  - Out of bed to chair    times a day   - Out of bed for meals    times a day  - Out of bed for toileting  - Record patient progress and toleration of activity level   Outcome: Progressing     Problem: Prexisting or High Potential for Compromised Skin Integrity  Goal: Skin integrity is maintained or improved  Description: INTERVENTIONS:  - Identify patients at risk for skin breakdown  - Assess and monitor skin integrity  - Assess and monitor nutrition and hydration status  - Monitor labs   - Assess for incontinence   - Turn and reposition patient  - Assist with mobility/ambulation  - Relieve pressure over bony prominences  - Avoid friction and shearing  - Provide appropriate hygiene as needed including keeping skin clean and dry  - Evaluate need for skin moisturizer/barrier cream  - Collaborate with interdisciplinary team   - Patient/family teaching  - Consider wound care consult   Outcome: Progressing     Problem: PAIN - ADULT  Goal: Verbalizes/displays adequate comfort level or baseline comfort level  Description: Interventions:  - Encourage patient to monitor pain and request assistance  - Assess pain using appropriate pain scale  - Administer analgesics based on type and severity of pain and evaluate response  - Implement non-pharmacological measures as appropriate and evaluate response  - Consider cultural and social influences on pain and pain management  - Notify physician/advanced practitioner if interventions unsuccessful or patient reports new pain  Outcome: Progressing     Problem: INFECTION - ADULT  Goal: Absence or prevention of progression during hospitalization  Description: INTERVENTIONS:  - Assess and monitor for signs and symptoms of infection  - Monitor lab/diagnostic results  - Monitor all insertion sites, i e  indwelling lines, tubes, and drains  - Monitor endotracheal if appropriate and nasal secretions for changes in amount and color  - [place] appropriate cooling/warming therapies per order  - Administer medications as ordered  - Instruct and encourage patient and family to use good hand hygiene technique  - Identify and instruct in appropriate isolation precautions for identified infection/condition  Outcome: Progressing     Problem: SAFETY ADULT  Goal: Maintain or return to baseline ADL function  Description: INTERVENTIONS:  -  Assess patient's ability to carry out ADLs; assess patient's baseline for ADL function and identify physical deficits which impact ability to perform ADLs (bathing, care of mouth/teeth, toileting, grooming, dressing, etc )  - Assess/evaluate cause of self-care deficits - Assess range of motion  - Assess patient's mobility; develop plan if impaired  - Assess patient's need for assistive devices and provide as appropriate  - Encourage maximum independence but intervene and supervise when necessary  - Involve family in performance of ADLs  - Assess for home care needs following discharge   - Consider OT consult to assist with ADL evaluation and planning for discharge  - Provide patient education as appropriate  Outcome: Progressing  Goal: Maintains/Returns to pre admission functional level  Description: INTERVENTIONS:  - Perform BMAT or MOVE assessment daily    - Set and communicate daily mobility goal to care team and patient/family/caregiver  - Collaborate with rehabilitation services on mobility goals if consulted  - Perform Range of Motion    times a day  - Reposition patient every    hours    - Dangle patient    times a day  - Stand patient    times a day  - Ambulate patient    times a day  - Out of bed to chair    times a day   - Out of bed for meals    times a day  - Out of bed for toileting  - Record patient progress and toleration of activity level   Outcome: Progressing  Goal: Patient will remain free of falls  Description: INTERVENTIONS:  - Educate patient/family on patient safety including physical limitations  - Instruct patient to call for assistance with activity   - Consult OT/PT to assist with strengthening/mobility   - Keep Call bell within reach  - Keep bed low and locked with side rails adjusted as appropriate  - Keep care items and personal belongings within reach  - Initiate and maintain comfort rounds  - Make Fall Risk Sign visible to staff  - Offer Toileting every    Hours, in advance of need  - Initiate/Maintain   alarm  - Obtain necessary fall risk management equipment:     - Apply yellow socks and bracelet for high fall risk patients  - Consider moving patient to room near nurses station  Outcome: Progressing     Problem: DISCHARGE PLANNING  Goal: Discharge to home or other facility with appropriate resources  Description: INTERVENTIONS:  - Identify barriers to discharge w/patient and caregiver  - Arrange for needed discharge resources and transportation as appropriate  - Identify discharge learning needs (meds, wound care, etc )  - Arrange for interpretive services to assist at discharge as needed  - Refer to Case Management Department for coordinating discharge planning if the patient needs post-hospital services based on physician/advanced practitioner order or complex needs related to functional status, cognitive ability, or social support system  Outcome: Progressing     Problem: Knowledge Deficit  Goal: Patient/family/caregiver demonstrates understanding of disease process, treatment plan, medications, and discharge instructions  Description: Complete learning assessment and assess knowledge base    Interventions:  - Provide teaching at level of understanding  - Provide teaching via preferred learning methods  Outcome: Progressing     Problem: SAFETY,RESTRAINT: NV/NON-SELF DESTRUCTIVE BEHAVIOR  Goal: Remains free of harm/injury (restraint for non violent/non self-detsructive behavior)  Description: INTERVENTIONS:  - Instruct patient/family regarding restraint use   - Assess and monitor physiologic and psychological status   - Provide interventions and comfort measures to meet assessed patient needs   - Identify and implement measures to help patient regain control  - Assess readiness for release of restraint   Outcome: Progressing  Goal: Returns to optimal restraint-free functioning  Description: INTERVENTIONS:  - Assess the patient's behavior and symptoms that indicate continued need for restraint  - Identify and implement measures to help patient regain control  - Assess readiness for release of restraint   Outcome: Progressing     Problem: Potential for Falls  Goal: Patient will remain free of falls  Description: INTERVENTIONS:  - Educate patient/family on patient safety including physical limitations  - Instruct patient to call for assistance with activity   - Consult OT/PT to assist with strengthening/mobility   - Keep Call bell within reach  - Keep bed low and locked with side rails adjusted as appropriate  - Keep care items and personal belongings within reach  - Initiate and maintain comfort rounds  - Make Fall Risk Sign visible to staff  - Offer Toileting every    Hours, in advance of need  - Initiate/Maintain   alarm  - Obtain necessary fall risk management equipment:     - Apply yellow socks and bracelet for high fall risk patients  - Consider moving patient to room near nurses station  Outcome: Progressing     Problem: Nutrition/Hydration-ADULT  Goal: Nutrient/Hydration intake appropriate for improving, restoring or maintaining nutritional needs  Description: Monitor and assess patient's nutrition/hydration status for malnutrition  Collaborate with interdisciplinary team and initiate plan and interventions as ordered  Monitor patient's weight and dietary intake as ordered or per policy  Utilize nutrition screening tool and intervene as necessary  Determine patient's food preferences and provide high-protein, high-caloric foods as appropriate       INTERVENTIONS:  - Monitor oral intake, urinary output, labs, and treatment plans  - Assess nutrition and hydration status and recommend course of action  - Evaluate amount of meals eaten  - Assist patient with eating if necessary   - Allow adequate time for meals  - Recommend/ encourage appropriate diets, oral nutritional supplements, and vitamin/mineral supplements  - Order, calculate, and assess calorie counts as needed  - Recommend, monitor, and adjust tube feedings and TPN/PPN based on assessed needs  - Assess need for intravenous fluids  - Provide specific nutrition/hydration education as appropriate  - Include patient/family/caregiver in decisions related to nutrition  Outcome: Progressing Problem: NEUROSENSORY - ADULT  Goal: Achieves stable or improved neurological status  Description: INTERVENTIONS  - Monitor and report changes in neurological status  - Monitor vital signs such as temperature, blood pressure, glucose, and any other labs ordered   - Initiate measures to prevent increased intracranial pressure  - Monitor for seizure activity and implement precautions if appropriate      Outcome: Progressing  Goal: Achieves maximal functionality and self care  Description: INTERVENTIONS  - Monitor swallowing and airway patency with patient fatigue and changes in neurological status  - Encourage and assist patient to increase activity and self care     - Encourage visually impaired, hearing impaired and aphasic patients to use assistive/communication devices  Outcome: Progressing     Problem: CARDIOVASCULAR - ADULT  Goal: Maintains optimal cardiac output and hemodynamic stability  Description: INTERVENTIONS:  - Monitor I/O, vital signs and rhythm  - Monitor for S/S and trends of decreased cardiac output  - Administer and titrate ordered vasoactive medications to optimize hemodynamic stability  - Assess quality of pulses, skin color and temperature  - Assess for signs of decreased coronary artery perfusion  - Instruct patient to report change in severity of symptoms  Outcome: Progressing  Goal: Absence of cardiac dysrhythmias or at baseline rhythm  Description: INTERVENTIONS:  - Continuous cardiac monitoring, vital signs, obtain 12 lead EKG if ordered  - Administer antiarrhythmic and heart rate control medications as ordered  - Monitor electrolytes and administer replacement therapy as ordered  Outcome: Progressing     Problem: RESPIRATORY - ADULT  Goal: Achieves optimal ventilation and oxygenation  Description: INTERVENTIONS:  - Assess for changes in respiratory status  - Assess for changes in mentation and behavior  - Position to facilitate oxygenation and minimize respiratory effort  - Oxygen administered by appropriate delivery if ordered  - Initiate smoking cessation education as indicated  - Encourage broncho-pulmonary hygiene including cough, deep breathe, Incentive Spirometry  - Assess the need for suctioning and aspirate as needed  - Assess and instruct to report SOB or any respiratory difficulty  - Respiratory Therapy support as indicated  Outcome: Progressing     Problem: GASTROINTESTINAL - ADULT  Goal: Maintains or returns to baseline bowel function  Description: INTERVENTIONS:  - Assess bowel function  - Encourage oral fluids to ensure adequate hydration  - Administer IV fluids if ordered to ensure adequate hydration  - Administer ordered medications as needed  - Encourage mobilization and activity  - Consider nutritional services referral to assist patient with adequate nutrition and appropriate food choices  Outcome: Progressing  Goal: Maintains adequate nutritional intake  Description: INTERVENTIONS:  - Monitor percentage of each meal consumed  - Identify factors contributing to decreased intake, treat as appropriate  - Assist with meals as needed  - Monitor I&O, weight, and lab values if indicated  - Obtain nutrition services referral as needed  Outcome: Progressing     Problem: GENITOURINARY - ADULT  Goal: Urinary catheter remains patent  Description: INTERVENTIONS:  - Assess patency of urinary catheter  - If patient has a chronic mayers, consider changing catheter if non-functioning  - Follow guidelines for intermittent irrigation of non-functioning urinary catheter  Outcome: Progressing     Problem: METABOLIC, FLUID AND ELECTROLYTES - ADULT  Goal: Electrolytes maintained within normal limits  Description: INTERVENTIONS:  - Monitor labs and assess patient for signs and symptoms of electrolyte imbalances  - Administer electrolyte replacement as ordered  - Monitor response to electrolyte replacements, including repeat lab results as appropriate  - Instruct patient on fluid and nutrition as appropriate  Outcome: Progressing  Goal: Fluid balance maintained  Description: INTERVENTIONS:  - Monitor labs   - Monitor I/O and WT  - Instruct patient on fluid and nutrition as appropriate  - Assess for signs & symptoms of volume excess or deficit  Outcome: Progressing     Problem: SKIN/TISSUE INTEGRITY - ADULT  Goal: Skin Integrity remains intact(Skin Breakdown Prevention)  Description: Assess:  -Perform James assessment every     -Clean and moisturize skin every     -Inspect skin when repositioning, toileting, and assisting with ADLS  -Assess under medical devices such as    every     -Assess extremities for adequate circulation and sensation     Bed Management:  -Have minimal linens on bed & keep smooth, unwrinkled  -Change linens as needed when moist or perspiring  -Avoid sitting or lying in one position for more than    hours while in bed  -Keep HOB at   degrees     Toileting:  -Offer bedside commode  -Assess for incontinence every     -Use incontinent care products after each incontinent episode such as       Activity:  -Mobilize patient    times a day  -Encourage activity and walks on unit  -Encourage or provide ROM exercises   -Turn and reposition patient every    Hours  -Use appropriate equipment to lift or move patient in bed  -Instruct/ Assist with weight shifting every    when out of bed in chair  -Consider limitation of chair time    hour intervals    Skin Care:  -Avoid use of baby powder, tape, friction and shearing, hot water or constrictive clothing  -Relieve pressure over bony prominences using     -Do not massage red bony areas    Next Steps:  -Teach patient strategies to minimize risks such as      -Consider consults to  interdisciplinary teams such as     Outcome: Progressing     Problem: HEMATOLOGIC - ADULT  Goal: Maintains hematologic stability  Description: INTERVENTIONS  - Assess for signs and symptoms of bleeding or hemorrhage  - Monitor labs  - Administer supportive blood products/factors as ordered and appropriate  Outcome: Progressing     Problem: MUSCULOSKELETAL - ADULT  Goal: Maintain or return mobility to safest level of function  Description: INTERVENTIONS:  - Assess patient's ability to carry out ADLs; assess patient's baseline for ADL function and identify physical deficits which impact ability to perform ADLs (bathing, care of mouth/teeth, toileting, grooming, dressing, etc )  - Assess/evaluate cause of self-care deficits   - Assess range of motion  - Assess patient's mobility  - Assess patient's need for assistive devices and provide as appropriate  - Encourage maximum independence but intervene and supervise when necessary  - Involve family in performance of ADLs  - Assess for home care needs following discharge   - Consider OT consult to assist with ADL evaluation and planning for discharge  - Provide patient education as appropriate  Outcome: Progressing  Goal: Maintain proper alignment of affected body part  Description: INTERVENTIONS:  - Support, maintain and protect limb and body alignment  - Provide patient/ family with appropriate education  Outcome: Progressing     Problem: COPING  Goal: Pt/Family able to verbalize concerns and demonstrate effective coping strategies  Description: INTERVENTIONS:  - Assist patient/family to identify coping skills, available support systems and cultural and spiritual values  - Provide emotional support, including active listening and acknowledgement of concerns of patient and caregivers  - Reduce environmental stimuli, as able  - Provide patient education  - Assess for spiritual pain/suffering and initiate spiritual care, including notification of Pastoral Care or [facility] based community as needed  - Assess effectiveness of coping strategies  Outcome: Progressing  Goal: Will report anxiety at manageable levels  Description: INTERVENTIONS:  - Administer medication as ordered  - Teach and encourage coping skills  - Provide emotional support  - Assess patient/family for anxiety and ability to cope  Outcome: Progressing

## 2022-03-04 NOTE — PROGRESS NOTES
[address] [facility]  Progress Note - Carisa Rough 1945, 64 y o  female MRN: [id number]  Unit/Bed#: ICU 09 Encounter: [id number]  Primary Care Provider: Lamount Cogan, MD   Date and time admitted to hospital: 2/26/2022  5:25 PM    Pneumonia  Assessment & Plan  + Serratia and H  Flu in sputum cx from 3/2  Tmax 102 4 on 3/3  Currently on ceftriaxone 1 gram daily  Hematoma of neck  Assessment & Plan  See above  Angiogram was negative  * Cervical spine fracture (HCC)  Assessment & Plan  Bilateral laminae and spinous process fractures of C5 and C6   · S/p mechanical trip and fall with hyperextension injury into washing machine  · Large anterior cervical spine prevertebral hematoma with active extravasation s/p angiogram on presentation  · Intubated for airway protection in setting of large hematoma  · Current exam no-focal  Intubated but nodding yes/no off sedation  In vista collar  Denies neck pain or back pain  Moving all extremities  Denies numbness or weakness  Imaging:  · Cerebral angiogram 2/26/2022: negative, no active identified hemorrhage  · MRI cervical spine wo, 2/26/2022: Slight widening of the anterior aspect of the C6-7 disc space with rupture of the anterior longitudinal ligament  The posterior longitudinal ligament and ligamentum flavum appear intact  Patient has known posterior element fractures at C5 and C6 incompletely evaluated on this examination  There is mild interspinous ligament injury in the posterior soft tissues extending from C2 through C5  Large prevertebral space hematoma anterior to the cervical spine as described above  · CT cervical spine wo, 2/26/2022: Extensive multilevel degenerative disc disease as above  Nondisplaced fractures in the lamina bilaterally and spinous process at C5 and C6  Displaced ossific focus anterior to the C6-7 disc space suggesting avulsed displaced anterior osteophyte   Large soft tissue abnormality anterior to the C6 and C7 likely represent prevertebral hematoma  Plan:  · Continue to monitor neuro exam closely  · Transfuse as needed for active extravasation - Hgb 8 3 s/p 3 units PRBC at beginning of week  · Noted 2/28 with small retroperitoneal bleed, no intervention  · Maintain in cervical brace at all times  · Hold PT/OT secondary to instability  · Wean to extubate as able - continues with market tracheal edema  · Maintain MAP > 85 mmHg  · Anticipate surgical intervention (C3/C4-T1 posterior decompression/fusion) once hemodynamically stable, TBD, once completed a few days of abx and infectious profile decreased  Will phone family to discuss  · DVT ppx: SCDs, heparin SQ  Neurosurgery will continue to follow closely  Please call with any questions or concerns  Subjective/Objective   Chief Complaint: N/A    Subjective: Intubated and sedated  Off sedation follows commands  Objective: NAD  GCS 10T  Following commands in all 4 extremities  I/O       03/02 0701  03/03 0700 03/03 0701  03/04 0700 03/04 0701  03/05 0700    I V  (mL/kg) 2113 7 (29) 1081 (14 8)     NG/GT  65     IV Piggyback 616 7 353 3     Feedings 1040 1808     Total Intake(mL/kg) 3770 4 (51 6) 3307 3 (45 4)     Urine (mL/kg/hr) 1625 (0 9) 915 (0 5)     Stool  0     Total Output 1625 915     Net +2145 4 +2392 3            Unmeasured Stool Occurrence  2 x           Invasive Devices  Report    Peripheral Intravenous Line            Peripheral IV 03/02/22 Right Antecubital 1 day    Peripheral IV 03/03/22 Distal;Right;Ventral (anterior) Forearm 1 day    Peripheral IV 03/03/22 Dorsal (posterior); Left Hand 1 day          Arterial Line            Arterial Line 02/26/22 Left Radial 5 days          Drain            NG/OG/Enteral Tube Orogastric 16 Fr Left mouth 5 days    Urethral Catheter Temperature probe 16 Fr  5 days          Airway            ETT  Cuffed 6 mm -- days                Physical Exam:  Vitals: Blood pressure (!) 225/107, pulse 72, temperature 99 3 °F (37 4 °C), resp  rate (!) 30, height 5' 5" (1 651 m), weight 72 8 kg (160 lb 7 9 oz), SpO2 97 %  ,Body mass index is 26 71 kg/m²  Hemodynamic Monitoring: MAP: Arterial Line MAP (mmHg): 102 mmHg    General appearance: intubated and sedated  Head: Raccoon eyes, hematoma to bridge of nose  Eyes: EOMI, PERRL  Neck: vista brace  Back: no kyphosis present  Lungs: mechanically ventilated AC/VC  Heart: regular heart rate  Neurologic:   Mental status: GCS 10T  Cranial nerves: + corneal reflex, + cough/gag  Sensory: normal to LT  Motor: moving all extremities without focal weakness to command  Reflexes: 2+ and symmetric, no Ryder's or clonus        Lab Results:  Results from last 7 days   Lab Units 03/04/22  0544 03/03/22  0506 03/02/22  1355 03/02/22  0404 03/02/22  0404 03/01/22  0517 03/01/22  0517 02/27/22  1147 02/27/22  0450   WBC Thousand/uL 14 59* 17 27* 10 62*   < > 9 29   < > 8 70   < > 9 64   HEMOGLOBIN g/dL 8 3* 9 4* 10 1*   < > 9 0*   < > 9 2*   < > 6 8*   HEMATOCRIT % 26 0* 27 9* 29 7*   < > 26 6*   < > 26 4*   < > 21 2*   PLATELETS Thousands/uL 114* 141* 138*   < > 125*   < > 105*   < > 140*   NEUTROS PCT %  --   --   --   --  82*  --  87*  --  86*   MONOS PCT %  --   --   --   --  6   < > 5  --  3*   MONO PCT %  --  17* 2*  --   --   --   --   --   --     < > = values in this interval not displayed  Results from last 7 days   Lab Units 03/04/22  0544 03/03/22  1423 03/03/22  0506 02/27/22  0450 02/26/22  1531   POTASSIUM mmol/L 4 2 3 6 3 9   < > 3 4*   CHLORIDE mmol/L 118* 115* 114*   < > 106   CO2 mmol/L 24 24 26   < > 27   BUN mg/dL 36* 37* 41*   < > 31*   CREATININE mg/dL 1 00 0 90 0 94   < > 1 00   CALCIUM mg/dL 9 1 9 0 9 2   < > 8 6   ALK PHOS U/L  --   --   --   --  98   ALT U/L  --   --   --   --  63   AST U/L  --   --   --   --  32    < > = values in this interval not displayed       Results from last 7 days   Lab Units 03/04/22  0544 03/03/22  1420 03/03/22  0506   MAGNESIUM mg/dL 2 0 2 0 2 2     Results from last 7 days   Lab Units 03/04/22  0544 03/03/22  1423 03/03/22  0506   PHOSPHORUS mg/dL 2 1* 2 3 3 0     Results from last 7 days   Lab Units 02/26/22  1545   INR  1 12   PTT seconds 28     No results found for: TROPONINT  ABG:  Lab Results   Component Value Date    PHART 7 406 03/02/2022    KMC4HHQ 40 5 03/02/2022    PO2ART 92 9 03/02/2022    GZT6GPN 24 9 03/02/2022    BEART 0 2 03/02/2022    SOURCE Line, Arterial 03/02/2022       Imaging Studies: I have personally reviewed pertinent reports  and I have personally reviewed pertinent films in PACS    CTA head and neck w wo contrast    Result Date: 2/28/2022  Impression: No acute intracranial abnormality  Persistent large prevertebral soft tissue hematoma with extension inferiorly into posterior mediastinum  No active contrast extravasation identified, resolved since most recent CTA neck  Negative CTA head and neck for large vessel occlusion, dissection,  aneurysm, or high-grade stenosis  Unchanged acute small anteriorly displaced avulsed fracture fragment with widened anterior disc space at C6-C7 level, compatible with known anterior longitudinal ligament rupture  Unchanged acute nondisplaced fractures of bilateral lamina and spinous process at C5 and C6  Unchanged small subgaleal hematoma in bilateral frontal regions  Please see same-day CT chest abdomen pelvis with contrast for further evaluation  The study was marked in [facility] for immediate notification  Workstation performed: [id number]     XR chest portable    Result Date: 2/27/2022  Impression: Endotracheal tube as above  Widening of the right paratracheal stripe indicative of underlying adenopathy or mass  Right lower lobe subsegmental atelectasis  Workstation performed: [id number]     CT head without contrast    Result Date: 2/26/2022  Impression: No acute intracranial abnormality  Left nasal bone depressed comminuted fractures    Soft tissue swelling over the left infraorbital, nasal and supraorbital regions  The study was marked in [facility] for immediate notification  Workstation performed: [id number]     CTA neck w wo contrast    Result Date: 2/26/2022  Impression: There is an enlarging, large prevertebral hematoma within the cervical spine tracking inferiorly into the upper mediastinum  This is displacing the trachea anteriorly and resulting in moderate tracheal narrowing at the level of the tip of the endotracheal tube  There is brisk active arterial extravasation of contrast noted within the prevertebral hematoma immediately anterior to the C6-7 disc space, corresponding to a site of active bleeding  There is now widening of the disc space of C6-7 anteriorly, not present on the initial CT scan performed approximately 4 hours ago consistent with anterior longitudinal ligament disruption  Small avulsion fracture suspected  Stable posterior element fractures involving C5 and C6  No subluxation  I personally discussed this study with the trauma fellow on 2/26/2022 at 7:20 PM  Workstation performed: [id number]     CT cervical spine without contrast    Result Date: 2/26/2022  Impression: Extensive multilevel degenerative disc disease as above  Nondisplaced fractures in the lamina bilaterally and spinous process at C5 and C6  Displaced ossific focus anterior to the C6-7 disc space suggesting avulsed displaced anterior osteophyte  Large soft tissue abnormality anterior to the C6 and C7 likely represent prevertebral hematoma  I personally discussed this study with Evi Matt on 2/26/2022 at 3:21 PM   Workstation performed: [id number]     MRI cervical spine wo contrast    Result Date: 2/27/2022  Impression: Slight widening of the anterior aspect of the C6-7 disc space with rupture of the anterior longitudinal ligament  The posterior longitudinal ligament and ligamentum flavum appear intact   Patient has known posterior element fractures at C5 and C6 incompletely evaluated on this examination  There is mild interspinous ligament injury in the posterior soft tissues extending from C2 through C5  Large prevertebral space hematoma anterior to the cervical spine as described above  No cord compression or abnormal cord signal identified  Workstation performed: [id number]     CT chest abdomen pelvis w contrast    Result Date: 2/28/2022  Impression: Chest: 1  Decreased size of a prevertebral and mediastinal hematoma extending outside of the field of view into the neck  See concurrent CTA head/neck for further evaluation  There is diminished mass effect on the trachea  2   Small bilateral subacute hemothoraces with bibasilar atelectasis  3   Spiculated left breast mass measuring up to 2 9 cm  Recommend correlation with mammography  Abdomen/pelvis: 1  Acute right retroperitoneal hematoma expanding the right iliopsoas and iliacus musculature  Small amount of subacute hemoperitoneum in the right paracolic gutter and pelvis 2  Cholelithiasis with edematous wall thickening and pericholecystic fluid which could represent acute cholecystitis in the appropriate clinical scenario  Intra-axial hepatic biliary duct dilatation without evidence of obstructing stone  I personally discussed this study with Amalia Apple on 2/28/2022 at 4:24 PM  Workstation performed: [id number]     XR chest portable ICU    Result Date: 3/2/2022  Impression: Evidence of new pulmonary edema compared to 2/26/2022  Pleural effusions  Workstation performed: [id number]     CTA chest pe study    Result Date: 3/2/2022  Impression: No definite acute pulmonary emboli but interpretation is compromised by respiratory motion and small emboli can be obscured  Patchy new bilateral upper lobe alveolar nodules compatible with bronchopneumonia  Aspiration cannot be excluded  Stable large prevertebral hematoma in the lower neck and superior mediastinum   Acute nondisplaced anterolateral left 3rd rib fracture, not visible previously  Spiculated lesion in the left breast   While this could be a hematoma due to the adjacent rib fracture, follow-up is needed with outpatient mammography to exclude malignancy  Persistent small effusions and partial atelectasis of the lower lobes  This study was marked for immediate notification and follow-up  Workstation performed: [id number]     CT recon (no charge)    Result Date: 2/26/2022  Impression: Acute mildly displaced left nasal bone fractures  Otherwise no facial fracture identified  Hematoma anterior to the frontal bone in the midline tracks inferiorly into the preseptal soft tissues, left greater than right and into the left cheek  Workstation performed: [id number]       EKG, Pathology, and Other Studies: I have personally reviewed pertinent reports        VTE Pharmacologic Prophylaxis: Sequential compression device (Venodyne)  and Heparin    VTE Mechanical Prophylaxis: sequential compression device

## 2022-03-05 NOTE — PLAN OF CARE
Problem: MOBILITY - ADULT  Goal: Maintain or return to baseline ADL function  Description: INTERVENTIONS:  -  Assess patient's ability to carry out ADLs; assess patient's baseline for ADL function and identify physical deficits which impact ability to perform ADLs (bathing, care of mouth/teeth, toileting, grooming, dressing, etc )  - Assess/evaluate cause of self-care deficits   - Assess range of motion  - Assess patient's mobility; develop plan if impaired  - Assess patient's need for assistive devices and provide as appropriate  - Encourage maximum independence but intervene and supervise when necessary  - Involve family in performance of ADLs  - Assess for home care needs following discharge   - Consider OT consult to assist with ADL evaluation and planning for discharge  - Provide patient education as appropriate  Outcome: Progressing     Problem: INFECTION - ADULT  Goal: Absence or prevention of progression during hospitalization  Description: INTERVENTIONS:  - Assess and monitor for signs and symptoms of infection  - Monitor lab/diagnostic results  - Monitor all insertion sites, i e  indwelling lines, tubes, and drains  - Monitor endotracheal if appropriate and nasal secretions for changes in amount and color  - [place] appropriate cooling/warming therapies per order  - Administer medications as ordered  - Instruct and encourage patient and family to use good hand hygiene technique  - Identify and instruct in appropriate isolation precautions for identified infection/condition  Outcome: Progressing

## 2022-03-05 NOTE — RESPIRATORY THERAPY NOTE
RT Ventilator Management Note  Cam Boyd 64 y o  female MRN: [id number]  Unit/Bed#: ICU 09 Encounter: [id number]      Daily Screen       3/4/2022  0832 3/4/2022  0909          Patient safety screen outcome[de-identified] Passed Passed      Spont breathing trial % for 30 min: -- Yes      Spont breathing trial outcome[de-identified] -- Failed      Spont breathing trial reason failed: -- RR > 35 bpm;Oxygen saturation < 88% > than 5 mins      Previous settings resumed: -- Yes      Name of Medical Team Notified[de-identified] -- [facility]CS              Physical Exam:   Assessment Type: Assess only  General Appearance: Sedated  Respiratory Pattern: (P) Assisted  Chest Assessment: (P) Chest expansion symmetrical  Bilateral Breath Sounds: (P) Coarse  Suction: ET Tube      Resp Comments: (P) Pt is not in any distress at this time  No changes made to vent at this time  Pt doing well on current vent settings

## 2022-03-05 NOTE — PROGRESS NOTES
Daily Progress Note - Critical Care   Carisa Muñiz 64 y o  female MRN: [id number]  Unit/Bed#: ICU 09 Encounter: [id number]        ----------------------------------------------------------------------------------------  HPI:  64year old female presented on 2/26 with PMH of hypertension after a fall with hyperextension injury  Amazingly ambulatory at scene, arrived after driving herself to [email or web address] and was found to have non-displaced C5-C6 lamina bilaterally and and spinous process of C5-6, displaced ossific focus anterior to C6-7 disc space suggesting avulsed anterior osteophyte and prevertebral hematoma  She was transferred to [address] [place] [address] where she had stridor and was intubated with difficulty  CTA showed active extrav with tracheal narrowing therefore patient went to IR but no cervical vessels were bleeding  In the meantime she is being treated for H  Flu/Serratia PNA and awaiting neurosurgical procedure and likely tracheostomy    24hr events:   · Peak pressures high on vent, suctioned for copious, thick secretions; CXR obtained; largely unchanged  · PRN changed to labetalol given increasing tachycardia with PACs    ---------------------------------------------------------------------------------------  SUBJECTIVE  None given  Intubated/sedated    Review of Systems  Review of systems was unable to be performed secondary to intubation/sedation  ---------------------------------------------------------------------------------------  Assessment and Plan:    Neuro:    Diagnosis: Non-displaced fractures of C5/6 lamina and spinous process, C2-5 interspinous ligament strain with paravertebral hematoma  o Presented after a fall/hyperextension injury on 2/26  o CT C-spine 2/26:  - "Nondisplaced fractures in the lamina bilaterally and spinous process at C5 and C6  Displaced ossific focus anterior to the C6-7 disc space suggesting avulsed displaced anterior osteophyte  Large soft tissue abnormality anterior to the C6 and C7 likely represent prevertebral hematoma "  o Transferred to [facility]; arriving with stridor, intubated with difficulty with 6 0 ETT  o CTA 2/26 [place] [facility]   - " enlarging, large prevertebral hematoma within the cervical spine tracking inferiorly into the upper mediastinum  This is displacing the trachea anteriorly and resulting in moderate tracheal narrowing at the level of the tip of the endotracheal tube; brisk active arterial extravasation of contrast noted within the prevertebral hematoma immediately anterior to the C6-7 disc space, corresponding to a site of active bleeding endotracheal tube "  o Taken to Neuro IR without any bleeding sources found  o MRI C spine 2/27:  - "Slight widening of the anterior aspect of the C6-7 disc space with rupture of the anterior longitudinal ligament"  o Repeat CTA Neck, CT CAP:  - Decreased hematoma with diminished mass effect on trachea;  Acute right retroperitoneal hematoma expanding the right iliopsoas and iliacus musculature; Cholelithiasis with edematous wall thickening and pericholecystic fluid  o Continues to be neuro intact  o Plan:    Neurosurg following closely   Will need eventual C3/4-T1 PCDF once more stable and improving from Pneumonia   MAP goal > 85 for now     Sedation/analgesia   o Plan: Propofol/fentanyl; consider change to precedex      CV:    Diagnosis: Hypertension  o Plan:   - Hold home antihypertensives  - Maintain MAP > 85, SBP < 160  - Intermittently requiring Marcelino/Cardene  - Both currently off  Pulm:   Diagnosis: Acute hypoxic respiratory failure/airway compromise from paravertebral hematoma initially with active extrav  Vent Day 7  Intubated for see above  Current vent settings 14/400/60% PEEP 8  o Plan:   - Wean FiO2 as able  - Daily SAT/SBT  - No plan to extubate until after OR  - May require tracheostomy  - Would also check cuff leak daily given airway compromise and 6 0 tube in place   Diagnosis: H   Flu Pneumonia (see ID section)        GI:    Diagnosis: Gallbladder wall thickening and pericholecystic fluid  o Plan:   - Currently no concerns for cholecystitis but may benefit from RUQ ultrasound   Stress Ulcer PPX  o Pepcid     Bowel Regimen:  o  Senokot  o Last Bowel Movement: 3/4      :    Diagnosis: MILVIA  Baseline Cr: 0 8  Admission Cr: 1 00  Peak Cr 1 74  Current Cr: 1 00  Etiology: Contrast induced vs acute blood loss  o Plan:   - Avoid nephrotoxic medications/hypotension   - Trend UOP  F/E/N:   Fluids: None  Electrolytes: Replete for K >4, <5 ; Phos > 3; and Mag > 2  Nutrition: VItal AF at 60 cc/hr      Heme/Onc:    Diagnosis: Acute blood loss anemia  o Secondary to below  o Received a total of 3 U PRBC this admission  o Plan:   - Trend Qdaily  - Holding DVT PPx   Diagnosis: Prevertebral Hematoma  o Plan:   - Swelling much less on last CTA  - Daily cuff leak test   Diagnosis: Right sided retroperitoneal hematoma  - Etiology unclear  o Plan:    Monitor expectently   H/H stable    Endo:   No acute/chronic issues      ID:    Diagnosis: Pneumonia  o Sputum Cx from 3/2 with 2+ H flu, 2+ serratia   o Plan:   - Continue rocephin 7 day course  - Chest PT, pulm toilet as patient still with copious secretions    MSK/Skin:    Diagnosis: Non-displaced L 3rd rib Fx  o Incidentally noted on CTA chest 3/2  o Plan:  - Analgesia   Continue vigilant skin care, offloading of pressure points    Patient appropriate for transfer out of the ICU today?: No  Disposition: Continue Critical Care   Code Status: Level 1 - Full Code  ---------------------------------------------------------------------------------------  ICU CORE MEASURES    Prophylaxis   VTE Pharmacologic Prophylaxis: Pharmacologic VTE Prophylaxis contraindicated due to Bleeding  VTE Mechanical Prophylaxis: sequential compression device  Stress Ulcer Prophylaxis: Prophylaxis Not Indicated     ABCDE Protocol (if indicated)  Plan to perform spontaneous awakening trial today? No  Plan to perform spontaneous breathing trial today? No  Obvious barriers to extubation? Yes  CAM-ICU: Negative    Invasive Devices Review  Invasive Devices  Report    Peripheral Intravenous Line            Peripheral IV 22 Distal;Right;Ventral (anterior) Forearm 1 day    Peripheral IV 22 Right;Ventral (anterior) Forearm <1 day          Arterial Line            Arterial Line 22 Left Radial 6 days          Drain            NG/OG/Enteral Tube Orogastric 16 Fr Left mouth 6 days          Airway            ETT  Cuffed 6 mm -- days              Can any invasive devices be discontinued today? No  ---------------------------------------------------------------------------------------  OBJECTIVE    Vitals   Vitals:    22 2146 22 2200 22 2300 22 0000   BP: 119/67 134/57 114/58 119/56   BP Location:    Left arm   Pulse: 100 98 96 88   Resp: 19 20 21 22   Temp: 99 7 °F (37 6 °C) 99 7 °F (37 6 °C) 99 7 °F (37 6 °C) 99 7 °F (37 6 °C)   TempSrc:    Esophageal   SpO2: 94% 94% 96% 96%   Weight:       Height:         Temp (24hrs), Av 2 °F (37 9 °C), Min:99 °F (37 2 °C), Max:101 8 °F (38 8 °C)  Current: Temperature: 99 7 °F (37 6 °C)    Respiratory:  SpO2: SpO2: 95 %, SpO2 Activity: SpO2 Activity: At Rest, SpO2 Device: O2 Device: Ventilator  Nasal Cannula O2 Flow Rate (L/min): 2 L/min    Invasive/non-invasive ventilation settings   Respiratory  Report   Lab Data (Last 4 hours)    None         O2/Vent Data (Last 4 hours)    None                Physical Exam  Vitals and nursing note reviewed  Constitutional:       General: She is not in acute distress  Appearance: She is well-developed and well-groomed  Interventions: She is sedated, intubated and restrained  Cervical collar in place  HENT:      Head: Normocephalic and atraumatic  Eyes:      Extraocular Movements: Extraocular movements intact     Neck:      Comments: C-collar in place  Cardiovascular:      Rate and Rhythm: Tachycardia present  Rhythm irregular  Pulses: Normal pulses  Heart sounds: Normal heart sounds  Pulmonary:      Effort: She is intubated  Breath sounds: Rhonchi present  No rales  Comments: THick tan secretions from midline  Abdominal:      General: Bowel sounds are normal  There is no distension  Palpations: Abdomen is soft  Tenderness: There is no abdominal tenderness  Genitourinary:     Comments: Deferred  Musculoskeletal:         General: Normal range of motion  Skin:     General: Skin is warm and dry  Capillary Refill: Capillary refill takes less than 2 seconds  Neurological:      Comments: GCS 9T (2, 1T, 6); Follows commands in all extremities              Laboratory and Diagnostics:  Results from last 7 days   Lab Units 03/04/22  0544 03/03/22  0506 03/02/22  1355 03/02/22  0404 03/01/22  0517 02/28/22  2354 02/28/22  1801 02/28/22  1304 02/28/22  0518 02/27/22  1147 02/27/22  0450 02/26/22  1531 02/26/22  1531   WBC Thousand/uL 14 59* 17 27* 10 62* 9 29 8 70  --   --   --  9 91  --  9 64   < > 6 67   HEMOGLOBIN g/dL 8 3* 9 4* 10 1* 9 0* 9 2* 9 4* 9 5*   < > 6 5*   < > 6 8*   < > 9 2*   HEMATOCRIT % 26 0* 27 9* 29 7* 26 6* 26 4* 26 1* 27 5*   < > 20 0*   < > 21 2*   < > 29 5*   PLATELETS Thousands/uL 114* 141* 138* 125* 105*  --   --   --  107*  --  140*   < > 173   NEUTROS PCT %  --   --   --  82* 87*  --   --   --   --   --  86*  --  65   BANDS PCT %  --  7 2  --   --   --   --   --   --   --   --   --   --    MONOS PCT %  --   --   --  6 5  --   --   --   --   --  3*  --  6   MONO PCT %  --  17* 2*  --   --   --   --   --   --   --   --   --   --     < > = values in this interval not displayed       Results from last 7 days   Lab Units 03/04/22  0544 03/03/22  1423 03/03/22  0506 03/02/22  1355 03/02/22  0404 03/01/22  2258 03/01/22  0517 02/27/22  0450 02/26/22  1531   SODIUM mmol/L 142 143 142 140 139 140 137   < > 138   POTASSIUM mmol/L 4 2 3 6 3 9 3 6 3 9 3 9 4 0   < > 3 4*   CHLORIDE mmol/L 118* 115* 114* 112* 111* 111* 109*   < > 106   CO2 mmol/L 24 24 26 25 27 27 25   < > 27   ANION GAP mmol/L 0* 4 2* 3* 1* 2* 3*   < > 5   BUN mg/dL 36* 37* 41* 45* 48* 48* 52*   < > 31*   CREATININE mg/dL 1 00 0 90 0 94 1 07 0 95 1 01 1 14   < > 1 00   CALCIUM mg/dL 9 1 9 0 9 2 8 9 8 5 8 5 8 4   < > 8 6   GLUCOSE RANDOM mg/dL 118 162* 142* 130 128 124 135   < > 104   ALT U/L  --   --   --   --   --   --   --   --  63   AST U/L  --   --   --   --   --   --   --   --  32   ALK PHOS U/L  --   --   --   --   --   --   --   --  98   ALBUMIN g/dL  --   --   --   --   --   --   --   --  2 2*   TOTAL BILIRUBIN mg/dL  --   --   --   --   --   --   --   --  0 43    < > = values in this interval not displayed  Results from last 7 days   Lab Units 03/04/22  0544 03/03/22  1423 03/03/22  0506 03/02/22  0404 03/01/22  2258 02/28/22  0518 02/27/22  0450   MAGNESIUM mg/dL 2 0 2 0 2 2 2 3 2 4 2 0 1 6   PHOSPHORUS mg/dL 2 1* 2 3 3 0 2 8 2 9 5 9* 6 1*      Results from last 7 days   Lab Units 02/26/22  1545   INR  1 12   PTT seconds 28          Results from last 7 days   Lab Units 03/02/22  1840 03/02/22  1355   LACTIC ACID mmol/L 1 9 2 4*     ABG:  Results from last 7 days   Lab Units 03/02/22  2358 03/02/22  1358 03/02/22  1358   PH ART  7 406   < > 7 420   PCO2 ART mm Hg 40 5   < > 39 7   PO2 ART mm Hg 92 9   < > 83 1   HCO3 ART mmol/L 24 9   < > 25 2   BASE EXC ART mmol/L 0 2   < > 0 7   ABG SOURCE   --   --  Line, Arterial    < > = values in this interval not displayed  VBG:  Results from last 7 days   Lab Units 03/02/22  1358   ABG SOURCE  Line, Arterial     Results from last 7 days   Lab Units 03/04/22  0544 03/03/22  0913   PROCALCITONIN ng/ml 8 19* 11 05*       Micro  Results from last 7 days   Lab Units 03/02/22  1354 03/02/22  1353 03/02/22  1352 03/02/22  1351   BLOOD CULTURE  No Growth at 24 hrs   No Growth at 24 hrs   --   --    SPUTUM CULTURE   --   --  2+ Growth of Haemophilus influenzae*  2+ Growth of Serratia marcescens*  2+ Growth of   --    GRAM STAIN RESULT   --   --  4+ Polys*  Rare Gram positive rods*  Rare Gram negative rods*  Rare Gram positive cocci in pairs*  --    MRSA CULTURE ONLY   --   --   --  No Methicillin Resistant Staphlyococcus aureus (MRSA) isolated       EKG:   Imaging:  I have personally reviewed pertinent films in PACS    Intake and Output  I/O       03/03 0701 03/04 0700 03/04 0701 03/05 0700    P  O   0    I V  (mL/kg) 1081 (14 8) 593 4 (8 2)    NG/GT 65 120    IV Piggyback 353 3 106 7    Feedings 1808 915    Total Intake(mL/kg) 3307 3 (45 4) 1735 1 (23 8)    Urine (mL/kg/hr) 915 (0 5) 195 (0 1)    Stool 0 0    Total Output 915 195    Net +2392 3 +1540 1          Unmeasured Stool Occurrence 2 x 1 x          Height and Weights   Height: 5' 5" (165 1 cm)     Body mass index is 26 71 kg/m²  Weight (last 2 days)     Date/Time Weight    03/04/22 0600 72 8 (160 5)    03/03/22 0715 72 6 (160)            Nutrition       Diet Orders   (From admission, onward)             Start     Ordered    03/04/22 1410  Diet Enteral/Parenteral; Tube Feeding No Oral Diet; Vital AF 1 2; Continuous; 60  Diet effective now        References:    Nutrtion Support Algorithm Enteral vs  Parenteral   Question Answer Comment   Diet Type Enteral/Parenteral    Enteral/Parenteral Tube Feeding No Oral Diet    Tube Feeding Formula: Vital AF 1 2    Bolus/Cyclic/Continuous Continuous    Tube Feeding Goal Rate (mL/hr): 60    RD to adjust diet per protocol?  No        03/04/22 1409                  Active Medications  Scheduled Meds:  Current Facility-Administered Medications   Medication Dose Route Frequency Provider Last Rate    acetaminophen  975 mg Oral Q6H Arleene Asa, PA-C      cefTRIAXone  1,000 mg Intravenous Q24H Arleene Asa, PA-C 1,000 mg (03/04/22 0956)    chlorhexidine  15 mL Mouth/Throat Q12H [facility] Liam Cortes MD      fentaNYL  100 mcg/hr Intravenous Continuous Jersey Rodríguez  mcg/hr (03/05/22 0000)    fentanyl citrate (PF)  50 mcg Intravenous Q1H PRN Ivette Hernandez      Labetalol HCl  10 mg Intravenous Q6H PRN Jazzy Perez PA-C      metoclopramide  10 mg Intravenous Once PRN Jett Sanchez CRNA      niCARdipine  1-15 mg/hr Intravenous Titrated Navi Arvizu PA-C 7 5 mg/hr (03/04/22 0843)    phenylephine   mcg/min Intravenous Titrated Jersey Rodríguez MD Stopped (03/04/22 0825)    propofol  5-50 mcg/kg/min Intravenous Titrated Valeriano Ramirez MD 30 mcg/kg/min (03/05/22 5443)     Continuous Infusions:  fentaNYL, 100 mcg/hr, Last Rate: 100 mcg/hr (03/05/22 0000)  niCARdipine, 1-15 mg/hr, Last Rate: 7 5 mg/hr (03/04/22 0843)  phenylephine,  mcg/min, Last Rate: Stopped (03/04/22 0825)  propofol, 5-50 mcg/kg/min, Last Rate: 30 mcg/kg/min (03/05/22 0208)      PRN Meds:   fentanyl citrate (PF), 50 mcg, Q1H PRN  Labetalol HCl, 10 mg, Q6H PRN  metoclopramide, 10 mg, Once PRN        Allergies   No Known Allergies  ---------------------------------------------------------------------------------------  Advance Directive and Living Will:      Power of :    POLST:    ---------------------------------------------------------------------------------------  Care Time Delivered:   No Critical Care time spent     Jazzy Perez PA-C      Portions of the record may have been created with voice recognition software  Occasional wrong word or "sound a like" substitutions may have occurred due to the inherent limitations of voice recognition software    Read the chart carefully and recognize, using context, where substitutions have occurred

## 2022-03-05 NOTE — PROGRESS NOTES
Progress Note - Neurosurgery   Chela Anton 64 y o  female MRN: [id number]  Unit/Bed#: ICU 09 Encounter: [id number]    Assessment:  78-year-old woman with likely unstable cervical fracture, neurologically unchanged compared to recent examinations over the past few days after review with Dr Mily Hernandez  Surgical fixation delayed for Haemophilus and Serratia pneumonia  Currently being treated with IV vancomycin and cefepime  Infectious profile seems to be improving patient remains febrile  Discussed with the trauma team at bedside  Patient would require further optimization prior to surgical intervention  Tentative plan for surgery on Monday pending her progress  Plan:  1  Continue monitor neurological examination closely  2  Maintain cervical collar at all times  3  Plan to proceed to the OR for posterior cervical instrumented fixation fusion and decompression once pneumonia improved/resolved, tentatively for Monday  WBC (10 7) improving as is temperature (99 3)  4  Large prevertebral hematoma requiring intubation  Patient remains intubated at present  Patient may require tracheostomy given concern for upper compromise  5  Neurosurgery will continue to follow  VTE Prophylaxis: Sequential compression device Kemal Avers)     Subjective/Objective   Chief Complaint:  None, intubated sedated  Vitals: Blood pressure 118/54, pulse 102, temperature 99 3 °F (37 4 °C), resp  rate (!) 23, height 5' 5" (1 651 m), weight 87 1 kg (192 lb 0 3 oz), SpO2 95 %  ,Body mass index is 31 95 kg/m²  Hemodynamic Monitoring: MAP: Arterial Line MAP (mmHg): 70 mmHg    Physical Exam:   Physical Exam  Vitals reviewed  Skin:     General: Skin is warm and dry  Neurological:      Comments: Sedation held for approximately 45 minutes  Opens eyes to voice  Obeys commands in upper extremities, more briskly on the right than on the left    Obeys commands in lower extremities grossly more briskly on the right than on the left   Able to move toes bilaterally and dorsiflex/plantar flex left foot more so than right  Attempts to flex hips and knees bilaterally  3/5  bilaterally  Will raise right arm off the bed and to a lesser extent left arm of bed  Symmetric shoulder shrug  Neurologic Exam    Invasive Devices  Report    Peripheral Intravenous Line            Peripheral IV 03/03/22 Distal;Right;Ventral (anterior) Forearm 2 days    Peripheral IV 03/05/22 Left Antecubital <1 day          Arterial Line            Arterial Line 02/26/22 Left Radial 6 days          Drain            NG/OG/Enteral Tube Orogastric 16 Fr Left mouth 6 days          Airway            ETT  Cuffed 6 mm -- days              Lab Results:   I have personally reviewed pertinent results  Lab Results   Component Value Date    WBC 10 66 (H) 03/05/2022    HGB 8 1 (L) 03/05/2022    HCT 25 5 (L) 03/05/2022    MCV 98 03/05/2022     (L) 03/05/2022    MCH 31 2 03/05/2022    MCHC 31 8 03/05/2022    RDW 16 2 (H) 03/05/2022    MPV 12 1 03/05/2022    SODIUM 142 03/05/2022     (H) 03/05/2022    CO2 25 03/05/2022    BUN 41 (H) 03/05/2022    CREATININE 0 95 03/05/2022    CALCIUM 9 6 03/05/2022    EGFR 57 03/05/2022       Imaging Studies: I have personally reviewed pertinent reports  and I have personally reviewed pertinent films in PACS    Other Studies:  None

## 2022-03-05 NOTE — PLAN OF CARE
Problem: INFECTION - ADULT  Goal: Absence or prevention of progression during hospitalization  Description: INTERVENTIONS:  - Assess and monitor for signs and symptoms of infection  - Monitor lab/diagnostic results  - Monitor all insertion sites, i e  indwelling lines, tubes, and drains  - Monitor endotracheal if appropriate and nasal secretions for changes in amount and color  - Rudd appropriate cooling/warming therapies per order  - Administer medications as ordered  - Instruct and encourage patient and family to use good hand hygiene technique  - Identify and instruct in appropriate isolation precautions for identified infection/condition  Outcome: Progressing     Problem: SAFETY ADULT  Goal: Patient will remain free of falls  Description: INTERVENTIONS:  - Educate patient/family on patient safety including physical limitations  - Instruct patient to call for assistance with activity   - Consult OT/PT to assist with strengthening/mobility   - Keep Call bell within reach  - Keep bed low and locked with side rails adjusted as appropriate  - Keep care items and personal belongings within reach  - Initiate and maintain comfort rounds  - Make Fall Risk Sign visible to staff  - Offer Toileting every 2 Hours, in advance of need  - Initiate/Maintain bed alarm  - Apply yellow socks and bracelet for high fall risk patients  - Consider moving patient to room near nurses station  Outcome: Progressing

## 2022-03-05 NOTE — RESPIRATORY THERAPY NOTE
RT Ventilator Management Note  Whitney Lieu 64 y o  female MRN: [id number]  Unit/Bed#: ICU 09 Encounter: [id number]      Daily Screen       3/4/2022  0909 3/5/2022  0732          Patient safety screen outcome[de-identified] Passed Failed      Not Ready for Weaning due to[de-identified] -- FiO2 >60%;PEEP > 8cmH2O;Underline problem not resolved      Spont breathing trial % for 30 min: Yes --      Spont breathing trial outcome[de-identified] Failed --      Spont breathing trial reason failed: RR > 35 bpm;Oxygen saturation < 88% > than 5 mins --      Previous settings resumed: Yes --      Name of Medical Team Notified[de-identified] CCS --              Physical Exam:   Assessment Type: Assess only  General Appearance: Sedated  Respiratory Pattern: Assisted  Chest Assessment: Chest expansion symmetrical  Bilateral Breath Sounds: Coarse      Resp Comments: (P) no vent changes today, will continue to monitor

## 2022-03-05 NOTE — RESPIRATORY THERAPY NOTE
RT Ventilator Management Note  Argelia Schulz 64 y o  female MRN: [id number]  Unit/Bed#: ICU 09 Encounter: [id number]      Daily Screen       3/4/2022  0909 3/5/2022  0732          Patient safety screen outcome[de-identified] Passed Failed      Not Ready for Weaning due to[de-identified] -- FiO2 >60%;PEEP > 8cmH2O;Underline problem not resolved      Spont breathing trial % for 30 min: Yes --      Spont breathing trial outcome[de-identified] Failed --      Spont breathing trial reason failed: RR > 35 bpm;Oxygen saturation < 88% > than 5 mins --      Previous settings resumed: Yes --      Name of Medical Team Notified[de-identified] CCS --              Physical Exam:   Assessment Type: (P) Assess only  General Appearance: (P) Sedated  Respiratory Pattern: (P) Assisted  Chest Assessment: (P) Chest expansion symmetrical  Bilateral Breath Sounds: (P) Coarse  Suction: ET Tube      Resp Comments: (P) no weaning attempted per protocol, pt appears comfortable on current settings, will continue to monitor

## 2022-03-06 NOTE — RESPIRATORY THERAPY NOTE
RT Ventilator Management Note  Hayden Mcdermott 64 y o  female MRN: [id number]  Unit/Bed#: ICU 09 Encounter: [id number]      Daily Screen       3/5/2022  0732 3/6/2022  0736          Patient safety screen outcome[de-identified] Failed Passed      Not Ready for Weaning due to[de-identified] FiO2 >60%;PEEP > 8cmH2O;Underline problem not resolved --      Spont breathing trial % for 30 min: -- Yes              Physical Exam:   Respiratory Pattern: Assisted  Chest Assessment: Chest expansion symmetrical  Bilateral Breath Sounds: Coarse  O2 Device: vent      Resp Comments: (P) no vent changes today, pt appears comfortable will continue to monitor

## 2022-03-06 NOTE — PROGRESS NOTES
Vancomycin Assessment    Tray Leal is a 64 y o  female who is currently receiving vancomycin 1500mg IV q24h for Pneumonia     Relevant clinical data and objective history reviewed:  Creatinine   Date Value Ref Range Status   03/05/2022 0 95 0 60 - 1 30 mg/dL Final     Comment:     Standardized to IDMS reference method   03/04/2022 1 00 0 60 - 1 30 mg/dL Final     Comment:     Standardized to IDMS reference method   03/03/2022 0 90 0 60 - 1 30 mg/dL Final     Comment:     Standardized to IDMS reference method     /54   Pulse 98   Temp (!) 101 8 °F (38 8 °C)   Resp (!) 29   Ht 5' 5" (1 651 m)   Wt 87 1 kg (192 lb 0 3 oz)   LMP  (LMP Unknown)   SpO2 90%   BMI 31 95 kg/m²   I/O last 3 completed shifts: In: 2617 2 [I V :945 5; NG/GT:120; IV Piggyback:156 7; Feedings:1395]  Out: 1545 [Urine:1545]  Lab Results   Component Value Date/Time    BUN 41 (H) 03/05/2022 05:27 AM    WBC 10 66 (H) 03/05/2022 05:27 AM    HGB 8 1 (L) 03/05/2022 05:27 AM    HCT 25 5 (L) 03/05/2022 05:27 AM    MCV 98 03/05/2022 05:27 AM     (L) 03/05/2022 05:27 AM     Temp Readings from Last 3 Encounters:   03/05/22 (!) 101 8 °F (38 8 °C)   02/26/22 (!) 96 5 °F (35 8 °C) (Tympanic)   09/09/21 97 9 °F (36 6 °C) (Tympanic)     Vancomycin Days of Therapy: 1    Assessment/Plan  The patient is currently on vancomycin utilizing scheduled dosing based on actual body weight  Baseline risks associated with therapy include: pre-existing renal impairment and advanced age  The patient is currently receiving 1500mg IV q24h and is clinically appropriate and dose will be continued  Pharmacy will also follow closely for s/sx of nephrotoxicity, infusion reactions, and appropriateness of therapy  BMP and CBC will be ordered per protocol  Plan for trough as patient approaches steady state, prior to the 3rd  dose at approximately 0030 on 3/8  Due to infection severity, will target a trough of 15-20 (appropriate for most indications)   Pharmacy will continue to follow the patients culture results and clinical progress daily      Alf Marion, Pharmacist

## 2022-03-06 NOTE — PLAN OF CARE
Problem: PAIN - ADULT  Goal: Verbalizes/displays adequate comfort level or baseline comfort level  Description: Interventions:  - Encourage patient to monitor pain and request assistance  - Assess pain using appropriate pain scale  - Administer analgesics based on type and severity of pain and evaluate response  - Implement non-pharmacological measures as appropriate and evaluate response  - Consider cultural and social influences on pain and pain management  - Notify physician/advanced practitioner if interventions unsuccessful or patient reports new pain  Outcome: Progressing     Problem: INFECTION - ADULT  Goal: Absence or prevention of progression during hospitalization  Description: INTERVENTIONS:  - Assess and monitor for signs and symptoms of infection  - Monitor lab/diagnostic results  - Monitor all insertion sites, i e  indwelling lines, tubes, and drains  - Monitor endotracheal if appropriate and nasal secretions for changes in amount and color  - [place] appropriate cooling/warming therapies per order  - Administer medications as ordered  - Instruct and encourage patient and family to use good hand hygiene technique  - Identify and instruct in appropriate isolation precautions for identified infection/condition  Outcome: Progressing     Problem: SAFETY ADULT  Goal: Maintain or return to baseline ADL function  Description: INTERVENTIONS:  -  Assess patient's ability to carry out ADLs; assess patient's baseline for ADL function and identify physical deficits which impact ability to perform ADLs (bathing, care of mouth/teeth, toileting, grooming, dressing, etc )  - Assess/evaluate cause of self-care deficits   - Assess range of motion  - Assess patient's mobility; develop plan if impaired  - Assess patient's need for assistive devices and provide as appropriate  - Encourage maximum independence but intervene and supervise when necessary  - Involve family in performance of ADLs  - Assess for home care needs following discharge   - Consider OT consult to assist with ADL evaluation and planning for discharge  - Provide patient education as appropriate  Outcome: Progressing     Problem: NEUROSENSORY - ADULT  Goal: Achieves stable or improved neurological status  Description: INTERVENTIONS  - Monitor and report changes in neurological status  - Monitor vital signs such as temperature, blood pressure, glucose, and any other labs ordered   - Initiate measures to prevent increased intracranial pressure  - Monitor for seizure activity and implement precautions if appropriate      Outcome: Progressing     Problem: NEUROSENSORY - ADULT  Goal: Achieves maximal functionality and self care  Description: INTERVENTIONS  - Monitor swallowing and airway patency with patient fatigue and changes in neurological status  - Encourage and assist patient to increase activity and self care     - Encourage visually impaired, hearing impaired and aphasic patients to use assistive/communication devices  Outcome: Progressing

## 2022-03-06 NOTE — RESPIRATORY THERAPY NOTE
RT Ventilator Management Note  Carisa Muñiz 64 y o  female MRN: [id number]  Unit/Bed#: ICU 09 Encounter: [id number]      Daily Screen       3/4/2022  0909 3/5/2022  0732          Patient safety screen outcome[de-identified] Passed Failed      Not Ready for Weaning due to[de-identified] -- FiO2 >60%;PEEP > 8cmH2O;Underline problem not resolved      Spont breathing trial % for 30 min: Yes --      Spont breathing trial outcome[de-identified] Failed --      Spont breathing trial reason failed: RR > 35 bpm;Oxygen saturation < 88% > than 5 mins --      Previous settings resumed: Yes --      Name of Medical Team Notified[de-identified] CCS --              Physical Exam:   Assessment Type: Assess only  General Appearance: Sedated  Respiratory Pattern: (P) Assisted  Chest Assessment: (P) Chest expansion symmetrical  Bilateral Breath Sounds: (P) Coarse  Suction: ET Tube      Resp Comments: (P) Pt appears to be resting comfortably on current vent settings  No changes made to vent   Will continue to moniter pt per protocol

## 2022-03-06 NOTE — PROGRESS NOTES
Daily Progress Note - Critical Care   Nusrat Dao 64 y o  female MRN: [id number]  Unit/Bed#: ICU 09 Encounter: [id number]        ----------------------------------------------------------------------------------------  HPI:  Per my progress note 15:  "64year old female presented on 2/26 with PMH of hypertension after a fall with hyperextension injury  Amazingly ambulatory at scene, arrived after driving herself to [facility] [employer] and was found to have non-displaced C5-C6 lamina bilaterally and and spinous process of C5-6, displaced ossific focus anterior to C6-7 disc space suggesting avulsed anterior osteophyte and prevertebral hematoma  She was transferred to [address] [place] [address] where she had stridor and was intubated with difficulty  CTA showed active extrav with tracheal narrowing therefore patient went to IR but no cervical vessels were bleeding  In the meantime she is being treated for H  Flu/Serratia PNA and awaiting neurosurgical procedure and likely tracheostomy"    24hr events:   · Rapid atrial fibrillation, persistently high fevers despite 975 mg Q6 hours, and hypertension; Given OT dose of metoprolol 5 mg IV  · Started precedex infusion  · Recultured; Procal sent; ABX escalated to cefepime/vancomycin  · Hayes cath replaced for urinary retention    ---------------------------------------------------------------------------------------  SUBJECTIVE  None given   Intubated/sedated    Review of Systems  Review of systems was unable to be performed secondary to intubated/sedated  ---------------------------------------------------------------------------------------  Assessment and Plan:    Neuro:   · Diagnosis: Non-displaced fractures of C5/6 lamina and spinous process, C2-5 interspinous ligament strain with paravertebral hematoma  · Presented after a fall/hyperextension injury on 2/26  · CT C-spine 2/26:  · "Nondisplaced fractures in the lamina bilaterally and spinous process at C5 and C6   Displaced ossific focus anterior to the C6-7 disc space suggesting avulsed displaced anterior osteophyte  Large soft tissue abnormality anterior to the C6 and C7 likely represent prevertebral hematoma "  · Transferred to [facility]; arriving with stridor, intubated with difficulty with 6 0 ETT  · CTA 2/26 after transfer to [place]   · " enlarging, large prevertebral hematoma within the cervical spine tracking inferiorly into the upper mediastinum   This is displacing the trachea anteriorly and resulting in moderate tracheal narrowing at the level of the tip of the endotracheal tube; brisk active arterial extravasation of contrast noted within the prevertebral hematoma immediately anterior to the C6-7 disc space, corresponding to a site of active bleeding endotracheal tube "  · Taken to Neuro IR without any bleeding sources found  · MRI C spine 2/27:  · "Slight widening of the anterior aspect of the C6-7 disc space with rupture of the anterior longitudinal ligament"  · Repeat CTA Neck, CT CAP:  · Decreased hematoma with diminished mass effect on trachea;  Acute right retroperitoneal hematoma expanding the right iliopsoas and iliacus musculature;  Cholelithiasis with edematous wall thickening and pericholecystic fluid  · Continues to be neuro intact    · Plan:   · Neurosurg following closely  · Will need eventual C3/4-T1 PCDF once more stable and improving from Pneumonia  · MAP goal > 85; SBP < 160    · Sedation/analgesia   · Plan: Propofol/fentanyl/precedex  · Check Triglycerides this AM; Consider D/C propofol        CV:   · Diagnosis: Hypertension  · Plan:   · Hold home antihypertensives  · Maintain MAP > 85, SBP < 160  · Intermittently requiring Marcelino/Cardene  · Both currently off    Pulm:  · Diagnosis: Acute hypoxic respiratory failure/airway compromise from paravertebral hematoma initially with active extrav  · Vent Day 7  · Intubated for see above  · Current vent settings 14/400/60% PEEP 8    · Plan:   · Wean FiO2 as able  · Daily SAT/SBT  · No plan to extubate until after OR  · May require tracheostomy  · Would also check cuff leak daily given airway compromise and 6 0 tube in place    · Diagnosis: H  Flu Pneumonia (see ID section)           GI:   · Diagnosis: Gallbladder wall thickening and pericholecystic fluid  · Plan:   · Currently no concerns for cholecystitis but may benefit from RUQ ultrasound    · Stress Ulcer PPX  · Pepcid    · Bowel Regimen:  · Senokot  · Last Bowel Movement: 3/5     :   · Diagnosis: MILVIA  · Baseline Cr: 0 8  · Admission Cr: 1 00  · Peak Cr 1 74  · Current Cr: 0 95  · Etiology: Contrast induced vs acute blood loss    · Plan:   · Avoid nephrotoxic medications/hypotension    · Trend UOP    F/E/N:   Fluids: None  Electrolytes: Replete for K >4, <5 ; Phos > 3; and Mag > 2  Nutrition: VItal AF at 60 cc/hr        Heme/Onc:   · Diagnosis: Acute blood loss anemia  · Secondary to below  · Received a total of 3 U PRBC this admission    · Plan:   · Trend Qdaily  · Holding DVT PPx    · Diagnosis: Prevertebral Hematoma  · Plan:   · Swelling much less on last CTA  · Daily cuff leak test    · Diagnosis: Right sided retroperitoneal hematoma   · Etiology unclear    · Plan:   · Monitor expectently  · H/H stable     Endo:   No acute/chronic issues        ID:   · Diagnosis: Pneumonia  · Sputum Cx from 3/2 with 2+ H flu, 2+ serratia   · Still persistently febrile O/N  · Recultured, broadened ABX  · Plan:   · Continue cefepime/vanco for now   · F/u MRSA Cx  · Appreciate Pharmacy recs  · Follow up new cultures  · Chest PT, pulm toilet as patient still with copious secretions     MSK/Skin:   · Diagnosis: Non-displaced L 3rd rib Fx  · Incidentally noted on CTA chest 3/2    · Plan:  · Analgesia   · Continue vigilant skin care, offloading of pressure points    Patient appropriate for transfer out of the ICU today?: No  Disposition: Continue Critical Care   Code Status: Level 1 - Full Code  ---------------------------------------------------------------------------------------  ICU CORE MEASURES    Prophylaxis   VTE Pharmacologic Prophylaxis: Pharmacologic VTE Prophylaxis contraindicated due to Paravertebral hematoma  VTE Mechanical Prophylaxis: sequential compression device  Stress Ulcer Prophylaxis: Prophylaxis Not Indicated     ABCDE Protocol (if indicated)  Plan to perform spontaneous awakening trial today? Yes  Plan to perform spontaneous breathing trial today? Yes  Obvious barriers to extubation? Yes, OR for PCDF   CAM-ICU: Negative    Invasive Devices Review  Invasive Devices  Report    Peripheral Intravenous Line            Peripheral IV 22 Distal;Right;Ventral (anterior) Forearm 2 days    Peripheral IV 22 Left Antecubital <1 day          Arterial Line            Arterial Line 22 Left Radial 7 days          Drain            NG/OG/Enteral Tube Orogastric 16 Fr Left mouth 7 days    Urethral Catheter Temperature probe <1 day          Airway            ETT  Cuffed 6 mm -- days              Can any invasive devices be discontinued today?  No  ---------------------------------------------------------------------------------------  OBJECTIVE    Vitals   Vitals:    22 2345 22 0015 22 0045 22 0100   BP: 105/54 107/57 124/66    BP Location:       Pulse: 98 88 94 90   Resp: (!) 29 (!) 27 (!) 31 (!) 24   Temp: (!) 101 8 °F (38 8 °C) (!) 101 1 °F (38 4 °C) (!) 101 1 °F (38 4 °C) (!) 101 5 °F (38 6 °C)   TempSrc:       SpO2: 90% 92% 93% 92%   Weight:       Height:         Temp (24hrs), Av 8 °F (38 2 °C), Min:99 3 °F (37 4 °C), Max:102 2 °F (39 °C)  Current: Temperature: (!) 101 5 °F (38 6 °C)    Respiratory:  SpO2: SpO2: 91 %, SpO2 Activity: SpO2 Activity: At Rest, SpO2 Device: O2 Device: Ventilator  Nasal Cannula O2 Flow Rate (L/min): 2 L/min    Invasive/non-invasive ventilation settings   Respiratory  Report   Lab Data (Last 4 hours)    None O2/Vent Data (Last 4 hours)    None                Physical Exam  Vitals reviewed  Constitutional:       General: She is not in acute distress  Appearance: She is well-developed and well-groomed  Interventions: She is sedated, intubated and restrained  Cervical collar in place  HENT:      Head: Normocephalic and atraumatic  Mouth/Throat:      Mouth: Mucous membranes are moist    Eyes:      Conjunctiva/sclera: Conjunctivae normal       Pupils: Pupils are equal, round, and reactive to light  Cardiovascular:      Rate and Rhythm: Tachycardia present  Rhythm irregular  Pulses: Normal pulses  Pulmonary:      Effort: Pulmonary effort is normal  She is intubated  Breath sounds: Rhonchi present  Abdominal:      General: Abdomen is flat  Bowel sounds are normal  There is no distension  Palpations: Abdomen is soft  Tenderness: There is no abdominal tenderness  Genitourinary:     Comments: Deferred  Musculoskeletal:         General: Normal range of motion  Right lower leg: No edema  Left lower leg: No edema  Skin:     General: Skin is warm and dry  Capillary Refill: Capillary refill takes less than 2 seconds  Neurological:      Mental Status: She is alert        Comments: GCS 9T (2, 1T, 6) follows commands In bilateral upper and lower extremites              Laboratory and Diagnostics:  Results from last 7 days   Lab Units 03/05/22  0527 03/04/22  0544 03/03/22  0506 03/02/22  1355 03/02/22  0404 03/01/22  0517 02/28/22  2354 02/28/22  1304 02/28/22  0518 02/27/22  1147 02/27/22  0450   WBC Thousand/uL 10 66* 14 59* 17 27* 10 62* 9 29 8 70  --   --  9 91  --  9 64   HEMOGLOBIN g/dL 8 1* 8 3* 9 4* 10 1* 9 0* 9 2* 9 4*   < > 6 5*   < > 6 8*   HEMATOCRIT % 25 5* 26 0* 27 9* 29 7* 26 6* 26 4* 26 1*   < > 20 0*   < > 21 2*   PLATELETS Thousands/uL 104* 114* 141* 138* 125* 105*  --   --  107*  --  140*   NEUTROS PCT %  --   --   --   --  82* 87*  --   --   --   -- 86*   BANDS PCT % 13*  --  7 2  --   --   --   --   --   --   --    MONOS PCT %  --   --   --   --  6 5  --   --   --   --  3*   MONO PCT % 3*  --  17* 2*  --   --   --   --   --   --   --     < > = values in this interval not displayed  Results from last 7 days   Lab Units 03/05/22  0527 03/04/22  0544 03/03/22  1423 03/03/22  0506 03/02/22  1355 03/02/22  0404 03/01/22  2258   SODIUM mmol/L 142 142 143 142 140 139 140   POTASSIUM mmol/L 4 0 4 2 3 6 3 9 3 6 3 9 3 9   CHLORIDE mmol/L 116* 118* 115* 114* 112* 111* 111*   CO2 mmol/L 25 24 24 26 25 27 27   ANION GAP mmol/L 1* 0* 4 2* 3* 1* 2*   BUN mg/dL 41* 36* 37* 41* 45* 48* 48*   CREATININE mg/dL 0 95 1 00 0 90 0 94 1 07 0 95 1 01   CALCIUM mg/dL 9 6 9 1 9 0 9 2 8 9 8 5 8 5   GLUCOSE RANDOM mg/dL 175* 118 162* 142* 130 128 124     Results from last 7 days   Lab Units 03/05/22  0527 03/04/22  0544 03/03/22  1423 03/03/22  0506 03/02/22  0404 03/01/22  2258 02/28/22  0518   MAGNESIUM mg/dL 2 0 2 0 2 0 2 2 2 3 2 4 2 0   PHOSPHORUS mg/dL 2 8 2 1* 2 3 3 0 2 8 2 9 5 9*               Results from last 7 days   Lab Units 03/02/22  1840 03/02/22  1355   LACTIC ACID mmol/L 1 9 2 4*     ABG:  Results from last 7 days   Lab Units 03/02/22  2358 03/02/22  1358 03/02/22  1358   PH ART  7 406   < > 7 420   PCO2 ART mm Hg 40 5   < > 39 7   PO2 ART mm Hg 92 9   < > 83 1   HCO3 ART mmol/L 24 9   < > 25 2   BASE EXC ART mmol/L 0 2   < > 0 7   ABG SOURCE   --   --  Line, Arterial    < > = values in this interval not displayed  VBG:  Results from last 7 days   Lab Units 03/02/22  1358   ABG SOURCE  Line, Arterial     Results from last 7 days   Lab Units 03/06/22  0057 03/04/22  0544 03/03/22  0913   PROCALCITONIN ng/ml 8 55* 8 19* 11 05*       Micro  Results from last 7 days   Lab Units 03/02/22  1354 03/02/22  1353 03/02/22  1352 03/02/22  1351   BLOOD CULTURE  No Growth at 48 hrs   No Growth at 48 hrs   --   --    SPUTUM CULTURE   --   --  2+ Growth of Haemophilus influenzae* 2+ Growth of Serratia marcescens*  2+ Growth of   --    GRAM STAIN RESULT   --   --  4+ Polys*  Rare Gram positive rods*  Rare Gram negative rods*  Rare Gram positive cocci in pairs*  --    MRSA CULTURE ONLY   --   --   --  No Methicillin Resistant Staphlyococcus aureus (MRSA) isolated       EKG:   Imaging:  I have personally reviewed pertinent films in PACS    Intake and Output  I/O       03/04 0701 03/05 0700 03/05 0701 03/06 0700    P  O  0     I V  (mL/kg) 732 (8 4) 371 (4 3)    NG/     IV Piggyback 106 7 50    Feedings 1275 480    Total Intake(mL/kg) 2233 7 (25 6) 901 (10 3)    Urine (mL/kg/hr) 695 (0 3) 1250 (0 6)    Stool 0 0    Total Output 695 1250    Net +1538 7 -349          Unmeasured Stool Occurrence 2 x 1 x          Height and Weights   Height: 5' 5" (165 1 cm)     Body mass index is 31 95 kg/m²  Weight (last 2 days)     Date/Time Weight    03/05/22 0600 87 1 (192 02)    03/04/22 0600 72 8 (160 5)            Nutrition       Diet Orders   (From admission, onward)             Start     Ordered    03/04/22 1410  Diet Enteral/Parenteral; Tube Feeding No Oral Diet; Vital AF 1 2; Continuous; 60  Diet effective now        References:    Nutrtion Support Algorithm Enteral vs  Parenteral   Question Answer Comment   Diet Type Enteral/Parenteral    Enteral/Parenteral Tube Feeding No Oral Diet    Tube Feeding Formula: Vital AF 1 2    Bolus/Cyclic/Continuous Continuous    Tube Feeding Goal Rate (mL/hr): 60    RD to adjust diet per protocol?  No        03/04/22 1409                  Active Medications  Scheduled Meds:  Current Facility-Administered Medications   Medication Dose Route Frequency Provider Last Rate    acetaminophen  975 mg Oral Q6H Peg Luther PA-C      cefepime  2,000 mg Intravenous Almarei Moore PA-C 2,000 mg (03/06/22 0101)    chlorhexidine  15 mL Mouth/Throat Q12H [address] Ihsan Rios MD      dexmedetomidine  0 1-1 4 mcg/kg/hr Intravenous Titrated Edwige ANGELO Valle 1 mcg/kg/hr (03/06/22 0101)    diltiazem  1-15 mg/hr Intravenous Titrated Bryson Sunshine PA-C Stopped (03/05/22 2330)    fentaNYL  100 mcg/hr Intravenous Continuous Diana Cardona  mcg/hr (03/05/22 2017)    fentanyl citrate (PF)  50 mcg Intravenous Q1H PRN Jeannie Diosilvia      Labetalol HCl  10 mg Intravenous Q6H PRN Bryson Sunshine PA-C      metoclopramide  10 mg Intravenous Once PRN Rufino Lee CRNA      propofol  5-50 mcg/kg/min Intravenous Titrated Citlaly Rojo MD 30 mcg/kg/min (03/06/22 0108)    vancomycin  1,500 mg Intravenous Q24H Bryson Sunshine PA-C       Continuous Infusions:  dexmedetomidine, 0 1-1 4 mcg/kg/hr, Last Rate: 1 mcg/kg/hr (03/06/22 0101)  diltiazem, 1-15 mg/hr, Last Rate: Stopped (03/05/22 2330)  fentaNYL, 100 mcg/hr, Last Rate: 100 mcg/hr (03/05/22 2017)  propofol, 5-50 mcg/kg/min, Last Rate: 30 mcg/kg/min (03/06/22 0108)      PRN Meds:   fentanyl citrate (PF), 50 mcg, Q1H PRN  Labetalol HCl, 10 mg, Q6H PRN  metoclopramide, 10 mg, Once PRN        Allergies   No Known Allergies  ---------------------------------------------------------------------------------------  Advance Directive and Living Will:      Power of :    POLST:    ---------------------------------------------------------------------------------------  Care Time Delivered:   No Critical Care time spent     Bryson Sunshine PA-C      Portions of the record may have been created with voice recognition software  Occasional wrong word or "sound a like" substitutions may have occurred due to the inherent limitations of voice recognition software    Read the chart carefully and recognize, using context, where substitutions have occurred

## 2022-03-06 NOTE — RESPIRATORY THERAPY NOTE
Resp Care; bedside bronch done by Dr Bernal/Dr Pilar Carbajal, pt tolerated well, specimens sent, will continue to monitor

## 2022-03-06 NOTE — PROCEDURES
Bronchoscopy (Bedside)    Date/Time: 3/6/2022 10:23 AM  Performed by: Yane Sosa MD  Authorized by: Yane Sosa MD     Patient location:  Bedside  Other Assisting Provider: Yes (comment) Pawel Desai MD)    Consent:     Consent obtained:  Written    Consent given by:  Healthcare agent    Risks discussed: Adverse reaction to sedation, bleeding, pneumothorax and pain    Alternatives discussed:  No treatment, alternative treatment and observation  Universal protocol:     Patient identity confirmed:  Arm band  Indications:     Procedure Purpose: diagnostic and therapeutic      Indications: pneumonia/infiltrate    Sedation:     Sedation type:  Continuous (ICU/vent)  Upper Airway:     Trachea: normal      [place]:  Normal  Airway:     Airway: Thi secretions in mainstem airways  Procedure details:     Description: Thin secretions throughout b/l upper and lower airways, suctioned and treated with lavage, tolerated well  Washings caught with lukens' trap, sent for cultures as ordered in chart    Post-procedure CXR ordered and reviewed  Procedures performed:     Lavage site:  B/l maintem and segmental bronchi   Post-procedure details:     Chest x-ray performed: yes      Patient tolerance of procedure: Tolerated well, no immediate complications    Incomplete procedure: No    Final Diagnosis/Findings: Thin secretions throughout b/l upper and lower airways, suctioned and treated with lavage, tolerated well      Washings caught with lukens' trap, sent for cultures as ordered in chart    Post-procedure CXR ordered and reviewed

## 2022-03-06 NOTE — PROGRESS NOTES
Progress Note - Neurosurgery   Beckie Dan 64 y o  female MRN: [id number]  Unit/Bed#: ICU 09 Encounter: [id number]    Assessment:  75-year-old woman with likely unstable cervical fracture  Neurological examination is stable to somewhat improved compared to yesterday  Surgical fixation of the cervical spine has been delayed secondary to pneumonia  Patient continues to be intermittently febrile  Otherwise, infectious profile seems to be improving  Awaiting surgical optimization prior to surgical intervention  Will discuss with the trauma team   Tentative plan for OR tomorrow, assuming infectious profile continues to improve  Plan:  1  Continue to monitor neurological examination closely  2  Maintain cervical collar at all times  Cervical collar appears to be well fitted at present  3  Though BC 10 8  Procalcitonin 8 6  Temperature 99 7  Patient continues on cefepime and vancomycin  4  SCDs for DVT prophylaxis  5  Patient will likely require tracheostomy at some point for prevertebral hematoma and upper airway compromise  6  Medical management as per trauma team       VTE Prophylaxis: Sequential compression device (Venodyne)     Subjective/Objective   Chief Complaint:  None, intubated and sedated  Vitals: Blood pressure 108/54, pulse 88, temperature 99 7 °F (37 6 °C), temperature source Bladder, resp  rate (!) 25, height 5' 5" (1 651 m), weight 87 1 kg (192 lb 0 3 oz), SpO2 94 %  ,Body mass index is 31 95 kg/m²  Hemodynamic Monitoring: MAP: Arterial Line MAP (mmHg): 90 mmHg    Physical Exam:   Physical Exam  Vitals reviewed  Neurological:      Comments: After 45 minutes sedation break, patient will intermittently open eyes to voice  Moves toes in flexes and dorsiflexes and plantar flexors bilaterally to command, relatively equally  Seems to attempt to flex hips and knees when asked    Patient will  lightly in both upper extremities and raise arms off bed more so on the right than on the left primarily with biceps flexion and some shoulder abduction  Sensory examination is unreliable  Neurologic Exam  Intake/Output                 03/06/22 0701 - 03/07/22 0700     [id number] Total              Intake    Feedings  120  -- 120    Total Intake 120 -- 120       Output    Stool  --  -- --    Unmeasured Stool Occurrence 1 x -- 1 x    Total Output -- -- --       Net I/O     120 -- 120        Invasive Devices  Report    Peripheral Intravenous Line            Peripheral IV 03/03/22 Distal;Right;Ventral (anterior) Forearm 3 days    Peripheral IV 03/05/22 Left Antecubital 1 day          Arterial Line            Arterial Line 02/26/22 Left Radial 7 days          Drain            NG/OG/Enteral Tube Orogastric 16 Fr Left mouth 7 days    Urethral Catheter Temperature probe <1 day          Airway            ETT  Cuffed 6 mm -- days              Lab Results:   I have personally reviewed pertinent results  Lab Results   Component Value Date    WBC 10 77 (H) 03/06/2022    HGB 8 7 (L) 03/06/2022    HCT 26 6 (L) 03/06/2022    MCV 95 03/06/2022     (L) 03/06/2022    MCH 31 2 03/06/2022    MCHC 32 7 03/06/2022    RDW 16 0 (H) 03/06/2022    MPV 12 1 03/06/2022    NRBC 0 03/06/2022    SODIUM 145 03/06/2022     (H) 03/06/2022    CO2 24 03/06/2022    BUN 56 (H) 03/06/2022    CREATININE 1 32 (H) 03/06/2022    CALCIUM 9 3 03/06/2022    EGFR 38 03/06/2022    COLORU Yellow 03/05/2022    CLARITYU Turbid 03/05/2022    SPECGRAV 1 020 03/05/2022    PHUR 5 5 03/05/2022    LEUKOCYTESUR Negative 03/05/2022    NITRITE Negative 03/05/2022    GLUCOSEU Negative 03/05/2022    KETONESU Negative 03/05/2022    BILIRUBINUR Negative 03/05/2022    BLOODU Moderate (A) 03/05/2022       Imaging Studies: I have personally reviewed pertinent reports  and I have personally reviewed pertinent films in PACS    Other Studies:  None

## 2022-03-07 NOTE — ANESTHESIA PREPROCEDURE EVALUATION
Procedure:  TRACHEOSTOMY (N/A Throat)    Relevant Problems   CARDIO   (+) Essential hypertension      /RENAL   (+) MILVIA (acute kidney injury) ([facility] [address] )      HEMATOLOGY   (+) Acute blood loss anemia      PULMONARY   (+) Acute respiratory failure (HCC)   (+) Pneumonia     Hemoglobin    9 2 Low           HX and PE limited by: intubation  Mae Bhatt is a 64 y o  female with h/o hypertension who presents after she fell  She tripped in appliance store and she tripped on a mat and fell forward striking her head against a washing mashine and hyper extended her neck  No LOC  She complained neck pain, No other injury  She presented to [facility] ED by driving herself  CT scan revealed "Nondisplaced fractures in the lamina bilaterally and spinous process at C5 and C6   Displaced ossific focus anterior to the C6-7 disc space suggesting avulsed displaced anterior osteophyte  Large soft tissue abnormality anterior to the C6 and C7 likely represent prevertebral hematoma " She was transferred to [address] [place] [address] for further management but she started having respiratory distress  She was intubated in ED [facility] for airway protection  CTA showed active arterial extravasation within hematoma anterior to the C6-7 disc space narrowing the trachea    Physical Exam    Airway  Comment: Intubated  Mallampati score: II  TM Distance: >3 FB  Neck ROM: full     Dental       Cardiovascular      Pulmonary      Other Findings        Anesthesia Plan  ASA Score- 4     Anesthesia Type- general with ASA Monitors  Additional Monitors: arterial line  Airway Plan: ETT  Plan Factors-    Chart reviewed  Induction- intravenous  Postoperative Plan-     Informed Consent-   I personally reviewed this patient with the CRNA  Discussed and agreed on the Anesthesia Plan with the CRNA  Ira Morin

## 2022-03-07 NOTE — PROGRESS NOTES
Daily Progress Note - Critical Care   Patrick Awad 64 y o  female MRN: [id number]  Unit/Bed#: ICU 09 Encounter: [id number]        ----------------------------------------------------------------------------------------  HPI:  Per my progress note:  "64year old female presented on 2/26 with PMH of hypertension after a fall with hyperextension injury  Amazingly ambulatory at scene, arrived after driving herself to [facility] and was found to have non-displaced C5-C6 lamina bilaterally and and spinous process of C5-6, displaced ossific focus anterior to C6-7 disc space suggesting avulsed anterior osteophyte and prevertebral hematoma  She was transferred to [address] [place] [address] where she had stridor and was intubated with difficulty  CTA showed active extrav with tracheal narrowing therefore patient went to IR but no cervical vessels were bleeding  In the meantime she is being treated for H  Flu/Serratia PNA and awaiting neurosurgical procedure and likely tracheostomy"      24hr events:   · Patient underwent pulmonary toilet and BAL culture, scant secretions found  · Increased hypoxia overnight requiring increasing FiO2 to 100%    Point of care cardiac ultrasound obtained, RV appears normal, no dilation, IVC with respiratory variation, see procedure note and images on PACS  · Episodes of bradycardia with suctioning/moving to 40s  · Fentanyl changed to Dilaudid due to increasing agitation and possible tachyphylaxis to fentanyl after 9 days  · Plan for tracheostomy today    ---------------------------------------------------------------------------------------  SUBJECTIVE  None given intubated/sedated    Review of Systems  Review of systems was unable to be performed secondary to Intubation/sedation  ---------------------------------------------------------------------------------------  Assessment and Plan:    Neuro:   · Diagnosis: Non-displaced fractures of C5/6 lamina and spinous process, C2-5 interspinous ligament strain with paravertebral hematoma  · Presented after a fall/hyperextension injury on 2/26  · CT C-spine 2/26:  · "Nondisplaced fractures in the lamina bilaterally and spinous process at C5 and C6   Displaced ossific focus anterior to the C6-7 disc space suggesting avulsed displaced anterior osteophyte  Large soft tissue abnormality anterior to the C6 and C7 likely represent prevertebral hematoma "  · Transferred to [facility]; arriving with stridor, intubated with difficulty with 6 0 ETT  · CTA 2/26 after transfer to [facility]   · " enlarging, large prevertebral hematoma within the cervical spine tracking inferiorly into the upper mediastinum   This is displacing the trachea anteriorly and resulting in moderate tracheal narrowing at the level of the tip of the endotracheal tube; brisk active arterial extravasation of contrast noted within the prevertebral hematoma immediately anterior to the C6-7 disc space, corresponding to a site of active bleeding endotracheal tube "  · Taken to Neuro IR without any bleeding sources found  · MRI C spine 2/27:  · "Slight widening of the anterior aspect of the C6-7 disc space with rupture of the anterior longitudinal ligament"  · Repeat CTA Neck, CT CAP:  · Decreased hematoma with diminished mass effect on trachea;  Acute right retroperitoneal hematoma expanding the right iliopsoas and iliacus musculature; Cholelithiasis with edematous wall thickening and pericholecystic fluid  · Continues to be neuro intact     · Plan:   · Neurosurg following closely  · Will need eventual C3/4-T1 PCDF once more stable and improving from Pneumonia  · MAP goal > 85; SBP < 160     · Sedation/analgesia   · Plan:  Dilaudid/precedex  · Propofol discontinued due to borderline triglycerides yesterday and hypotension  · Patient with intermittent bradycardia limiting Precedex dosage tolerance  · Changed from fentanyl to Dilaudid overnight        CV:   · Diagnosis: Hypertension  ?  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  · Plan:   · Wean precedex as able  · Discuss EP eval if this continues    Pulm:  · Diagnosis: Acute hypoxic respiratory failure/airway compromise from paravertebral hematoma initially with active extrav  · Vent Day 9  · Intubated for see above  · Current vent settings 14/400/60% PEEP 8  · Plan:   · For tracheostomy today  · Wean FiO2 as able  · Daily SAT/SBT    · Diagnosis: H  Flu Pneumonia (see ID section)           GI:   · Diagnosis: Gallbladder wall thickening and pericholecystic fluid  · Plan:   · Due to persistent fevers, right upper quadrant ultrasound obtained, numerous gallstones, normal wall thickness, trace pericholecystic fluid     · Stress Ulcer PPX  · Pepcid  · Bowel Regimen:  · Senokot  · Last Bowel Movement: 3/6     :   · Diagnosis: MILVIA  · Baseline Cr: 0 8  · Admission Cr: 1 00  · Peak Cr 1 74  · Current Cr: 0 95  · Etiology: Contrast induced vs acute blood loss     · Plan:   · Avoid nephrotoxic medications/hypotension    · Trend UOP     F/E/N:   Fluids: None  Electrolytes: Replete for K >4, <5 ; Phos > 3; and Mag > 2  Nutrition: VItal AF at 60 cc/hr        Heme/Onc:   · Diagnosis: Acute blood loss anemia  · Secondary to below  · Received a total of 3 U PRBC this admission     · Plan:   · Trend Qdaily  · Start DVT PPx  ·    · Diagnosis: Prevertebral Hematoma  · Plan:   · Swelling much less on last CTA  · Getting tracheostomy today  ·    · Diagnosis: Right sided retroperitoneal hematoma        · Etiology unclear  · Plan:   · Monitor expectently  · H/H stable    Endo:   No acute/chronic issues        ID:   · Diagnosis: Pneumonia  · Sputum Cx from 3/2 with 2+ H flu, 2+ serratia   · Still persistently febrile O/N  · Recultured, broadened ABX  · S/p Bropnch 3/6    · Plan:   · Continue cefepime/vanco for now   · F/u MRSA Cx  · [address]  · Follow up new cultures  · Chest PT, pulm toilet as patient still with copious secretions     MSK/Skin:   · Diagnosis: Non-displaced L 3rd rib Fx  · Incidentally noted on CTA chest 3/2     · Plan:  · Analgesia   · Continue vigilant skin care, offloading of pressure points      Patient appropriate for transfer out of the ICU today?: No  Disposition: Continue Critical Care   Code Status: Level 1 - Full Code  ---------------------------------------------------------------------------------------  ICU CORE MEASURES    Prophylaxis   VTE Pharmacologic Prophylaxis: Need to start today  VTE Mechanical Prophylaxis: sequential compression device  Stress Ulcer Prophylaxis: Prophylaxis Not Indicated     ABCDE Protocol (if indicated)  Plan to perform spontaneous awakening trial today? Yes  Plan to perform spontaneous breathing trial today? Yes  Obvious barriers to extubation? Yes  CAM-ICU: Negative    Invasive Devices Review  Invasive Devices  Report    Peripheral Intravenous Line            Peripheral IV 03/06/22 Dorsal (posterior); Left Hand <1 day    Peripheral IV 03/06/22 Right Antecubital <1 day          Arterial Line            Arterial Line 02/26/22 Left Radial 8 days          Drain            NG/OG/Enteral Tube Orogastric 16 Fr Left mouth 8 days    Urethral Catheter Temperature probe 1 day          Airway            ETT  Cuffed 6 mm -- days              Can any invasive devices be discontinued today?  No  ---------------------------------------------------------------------------------------  OBJECTIVE    Vitals   Vitals:    03/06/22 2300 03/07/22 0030 03/07/22 0145 03/07/22 0211   BP: 147/75 148/63 150/81    BP Location:       Pulse: 64 72 74    Resp: (!) 25 (!) 27 22    Temp: 99 °F (37 2 °C) 99 °F (37 2 °C) 99 3 °F (37 4 °C)    TempSrc:  Bladder     SpO2: (!) 85% 91% (!) 86% 90%   Weight: Height:         Temp (24hrs), Av 4 °F (38 °C), Min:99 °F (37 2 °C), Max:101 8 °F (38 8 °C)  Current: Temperature: 99 3 °F (37 4 °C)      Respiratory:  SpO2: SpO2: (!) 87 %  , SpO2 Activity: SpO2 Activity: At Rest, SpO2 Device: O2 Device: Ventilator  Nasal Cannula O2 Flow Rate (L/min): 2 L/min    Invasive/non-invasive ventilation settings   Respiratory  Report   Lab Data (Last 4 hours)    None         O2/Vent Data (Last 4 hours)       0211           Vent Mode AC/VC       Resp Rate (BPM) (BPM) 14       Vt (mL) (mL) 400       FIO2 (%) (%) 80       PEEP (cmH2O) (cmH2O) 8       MV 14                   Physical Exam  Vitals and nursing note reviewed  Constitutional:       General: She is not in acute distress  Interventions: She is sedated, intubated and restrained  Cervical collar in place  HENT:      Head: Normocephalic and atraumatic  Mouth/Throat:      Mouth: Mucous membranes are dry  Eyes:      Conjunctiva/sclera: Conjunctivae normal    Cardiovascular:      Rate and Rhythm: Normal rate and regular rhythm  Pulses: Normal pulses  Heart sounds: No murmur heard  Pulmonary:      Effort: She is intubated  Breath sounds: Normal breath sounds  Comments: Course rhonchi throughout all lung fields  Abdominal:      General: Bowel sounds are normal  There is distension  Tenderness: There is no abdominal tenderness  Genitourinary:     Comments: Deferred  Musculoskeletal:         General: Normal range of motion  Cervical back: Neck supple  Right lower leg: No edema  Skin:     General: Skin is warm and dry  Capillary Refill: Capillary refill takes less than 2 seconds     Neurological:      Comments: GCS 10T (3, 1T,6) RASS -1             Laboratory and Diagnostics:  Results from last 7 days   Lab Units 22  0555 22  0527 22  0544 22  0506 22  1355 22  0404 22  0517   WBC Thousand/uL 10 77* 10 66* 14 59* 17 27* 10 62* 9 29 8 70   HEMOGLOBIN g/dL 8 7* 8 1* 8 3* 9 4* 10 1* 9 0* 9 2*   HEMATOCRIT % 26 6* 25 5* 26 0* 27 9* 29 7* 26 6* 26 4*   PLATELETS Thousands/uL 119* 104* 114* 141* 138* 125* 105*   NEUTROS PCT %  --   --   --   --   --  82* 87*   BANDS PCT %  --  13*  --  7 2  --   --    MONOS PCT %  --   --   --   --   --  6 5   MONO PCT %  --  3*  --  17* 2*  --   --      Results from last 7 days   Lab Units 03/06/22  0555 03/05/22  0527 03/04/22  0544 03/03/22  1423 03/03/22  0506 03/02/22  1355 03/02/22  0404   SODIUM mmol/L 145 142 142 143 142 140 139   POTASSIUM mmol/L 4 1 4 0 4 2 3 6 3 9 3 6 3 9   CHLORIDE mmol/L 116* 116* 118* 115* 114* 112* 111*   CO2 mmol/L 24 25 24 24 26 25 27   ANION GAP mmol/L 5 1* 0* 4 2* 3* 1*   BUN mg/dL 56* 41* 36* 37* 41* 45* 48*   CREATININE mg/dL 1 32* 0 95 1 00 0 90 0 94 1 07 0 95   CALCIUM mg/dL 9 3 9 6 9 1 9 0 9 2 8 9 8 5   GLUCOSE RANDOM mg/dL 144* 175* 118 162* 142* 130 128     Results from last 7 days   Lab Units 03/06/22  0555 03/05/22  0527 03/04/22  0544 03/03/22  1423 03/03/22  0506 03/02/22  0404 03/01/22  2258   MAGNESIUM mg/dL 2 0 2 0 2 0 2 0 2 2 2 3 2 4   PHOSPHORUS mg/dL 4 1 2 8 2 1* 2 3 3 0 2 8 2 9               Results from last 7 days   Lab Units 03/02/22  1840 03/02/22  1355   LACTIC ACID mmol/L 1 9 2 4*     ABG:  Results from last 7 days   Lab Units 03/06/22  2331   PH ART  7 344*   PCO2 ART mm Hg 42 1   PO2 ART mm Hg 68 2*   HCO3 ART mmol/L 22 4   BASE EXC ART mmol/L -3 1   ABG SOURCE  Line, Arterial     VBG:  Results from last 7 days   Lab Units 03/06/22  2331   ABG SOURCE  Line, Arterial     Results from last 7 days   Lab Units 03/06/22  0555 03/06/22  0057 03/04/22  0544 03/03/22  0913   PROCALCITONIN ng/ml 8 62* 8 55* 8 19* 11 05*       Micro  Results from last 7 days   Lab Units 03/06/22  1046 03/06/22  0055 03/02/22  1354 03/02/22  1353 03/02/22  1352 03/02/22  1351   BLOOD CULTURE   --  Received in Microbiology Lab  Culture in Progress    Received in Microbiology Lab  Culture in Progress  No Growth at 72 hrs  No Growth at 72 hrs   --   --    SPUTUM CULTURE   --   --   --   --  2+ Growth of Haemophilus influenzae*  2+ Growth of Serratia marcescens*  2+ Growth of   --    GRAM STAIN RESULT  2+ Polys*  1+ Gram negative rods*  --   --   --  4+ Polys*  Rare Gram positive rods*  Rare Gram negative rods*  Rare Gram positive cocci in pairs*  --    MRSA CULTURE ONLY   --   --   --   --   --  No Methicillin Resistant Staphlyococcus aureus (MRSA) isolated       EKG:   Imaging:  I have personally reviewed pertinent films in PACS    Intake and Output  I/O       03/05 0701 03/06 0700 03/06 0701 03/07 0700    I V  (mL/kg) 913 7 (10 5) 447 4 (5 1)    NG/     IV Piggyback 100 100    Feedings 840 900    Total Intake(mL/kg) 2433 7 (27 9) 1447 4 (16 6)    Urine (mL/kg/hr) 1620 (0 8) 480 (0 2)    Stool 0 0    Total Output 1620 480    Net +813 7 +967 4          Unmeasured Stool Occurrence 1 x 4 x          Height and Weights   Height: 5' 5" (165 1 cm)     Body mass index is 31 95 kg/m²  Weight (last 2 days)     Date/Time Weight    03/05/22 0600 87 1 (192 02)            Nutrition       Diet Orders   (From admission, onward)             Start     Ordered    03/07/22 0001  Diet NPO  Diet effective midnight        References:    Nutrtion Support Algorithm Enteral vs  Parenteral   Question Answer Comment   Diet Type NPO    RD to adjust diet per protocol?  No        03/06/22 0912                  Active Medications  Scheduled Meds:  Current Facility-Administered Medications   Medication Dose Route Frequency Provider Last Rate    acetaminophen  975 mg Oral Q6H Pat Boogie PA-C      cefepime  2,000 mg Intravenous Q12H Sy Sam PA-C Stopped (03/07/22 0151)    chlorhexidine  15 mL Mouth/Throat Q12H [facility] Lavon Newby MD      dexmedetomidine  0 1-1 4 mcg/kg/hr Intravenous Titrated Sy Sam PA-C 1 1 mcg/kg/hr (03/07/22 0258)    HYDROmorphone  1 mg/hr Intravenous Continuous Edwige Valle [place]      HYDROmorphone  0 5 mg Intravenous Q4H PRN Edwige Cea, PA-C      Labetalol HCl  10 mg Intravenous Q6H PRN Edwige Cea, PA-C      metoclopramide  10 mg Intravenous Once PRN Adriana Guardado CRNA      sodium chloride  75 mL/hr Intravenous Continuous Ihsan Rios MD 75 mL/hr (03/07/22 0046)    vancomycin  15 mg/kg (Adjusted) Intravenous Q24H Laura Elliott DO 1,000 mg (03/07/22 0140)     Continuous Infusions:  dexmedetomidine, 0 1-1 4 mcg/kg/hr, Last Rate: 1 1 mcg/kg/hr (03/07/22 0258)  HYDROmorphone, 1 mg/hr  sodium chloride, 75 mL/hr, Last Rate: 75 mL/hr (03/07/22 0046)      PRN Meds:   HYDROmorphone, 0 5 mg, Q4H PRN  Labetalol HCl, 10 mg, Q6H PRN  metoclopramide, 10 mg, Once PRN        Allergies   No Known Allergies  ---------------------------------------------------------------------------------------  Advance Directive and Living Will:      Power of :    POLST:    ---------------------------------------------------------------------------------------  Care Time Delivered:   No Critical Care time spent     Edwige Cea, PA-C      Portions of the record may have been created with voice recognition software  Occasional wrong word or "sound a like" substitutions may have occurred due to the inherent limitations of voice recognition software    Read the chart carefully and recognize, using context, where substitutions have occurred

## 2022-03-07 NOTE — PROCEDURES
POC Cardiac US    Date/Time: 3/6/2022 11:24 PM  Performed by: Roma Frederick PA-C  Authorized by: Roma Frederick PA-C     Patient location:  ICU  Procedure details:     Exam Type:  Diagnostic    Indications: suspected volume depletion and dyspnea      Indications comment:  Hypoxia    Assessment / Evaluation for: inferior vena cava for fluid responsiveness, intravascular volume status and right heart strain (suspected pulmonary embolism)      Exam Type: initial exam      Image quality: limited diagnostic      Image availability:  Images available in PACS  Patient Details:     Cardiac Rhythm:  Regular    Mechanical ventilation: Yes    Cardiac findings:     Echo technique: limited 2D and M-mode      Views obtained: parasternal long axis, parasternal short axis and subcostal      Wall motion: normal      LV systolic function: normal      RV dilation: none    IVC findings:     IVC Size: small      IVC Inspiratory Collapse: partial    Interpretation:     Fluid Status:  Hypovolemic

## 2022-03-07 NOTE — PLAN OF CARE
Problem: MOBILITY - ADULT  Goal: Maintain or return to baseline ADL function  Description: INTERVENTIONS:  -  Assess patient's ability to carry out ADLs; assess patient's baseline for ADL function and identify physical deficits which impact ability to perform ADLs (bathing, care of mouth/teeth, toileting, grooming, dressing, etc )  - Assess/evaluate cause of self-care deficits   - Assess range of motion  - Assess patient's mobility; develop plan if impaired  - Assess patient's need for assistive devices and provide as appropriate  - Encourage maximum independence but intervene and supervise when necessary  - Involve family in performance of ADLs  - Assess for home care needs following discharge   - Consider OT consult to assist with ADL evaluation and planning for discharge  - Provide patient education as appropriate  Outcome: Progressing  Goal: Maintains/Returns to pre admission functional level  Description: INTERVENTIONS:  - Perform BMAT or MOVE assessment daily    - Set and communicate daily mobility goal to care team and patient/family/caregiver  - Collaborate with rehabilitation services on mobility goals if consulted  - Perform Range of Motion  times a day  - Reposition patient every  hours    - Dangle patient  times a day  - Stand patient  times a day  - Ambulate patient  times a day  - Out of bed to chair  times a day   - Out of bed for meals  times a day  - Out of bed for toileting  - Record patient progress and toleration of activity level   Outcome: Progressing     Problem: Prexisting or High Potential for Compromised Skin Integrity  Goal: Skin integrity is maintained or improved  Description: INTERVENTIONS:  - Identify patients at risk for skin breakdown  - Assess and monitor skin integrity  - Assess and monitor nutrition and hydration status  - Monitor labs   - Assess for incontinence   - Turn and reposition patient  - Assist with mobility/ambulation  - Relieve pressure over bony prominences  - Avoid friction and shearing  - Provide appropriate hygiene as needed including keeping skin clean and dry  - Evaluate need for skin moisturizer/barrier cream  - Collaborate with interdisciplinary team   - Patient/family teaching  - Consider wound care consult   Outcome: Progressing     Problem: PAIN - ADULT  Goal: Verbalizes/displays adequate comfort level or baseline comfort level  Description: Interventions:  - Encourage patient to monitor pain and request assistance  - Assess pain using appropriate pain scale  - Administer analgesics based on type and severity of pain and evaluate response  - Implement non-pharmacological measures as appropriate and evaluate response  - Consider cultural and social influences on pain and pain management  - Notify physician/advanced practitioner if interventions unsuccessful or patient reports new pain  Outcome: Progressing     Problem: INFECTION - ADULT  Goal: Absence or prevention of progression during hospitalization  Description: INTERVENTIONS:  - Assess and monitor for signs and symptoms of infection  - Monitor lab/diagnostic results  - Monitor all insertion sites, i e  indwelling lines, tubes, and drains  - Monitor endotracheal if appropriate and nasal secretions for changes in amount and color  - [place] appropriate cooling/warming therapies per order  - Administer medications as ordered  - Instruct and encourage patient and family to use good hand hygiene technique  - Identify and instruct in appropriate isolation precautions for identified infection/condition  Outcome: Progressing     Problem: SAFETY ADULT  Goal: Maintain or return to baseline ADL function  Description: INTERVENTIONS:  -  Assess patient's ability to carry out ADLs; assess patient's baseline for ADL function and identify physical deficits which impact ability to perform ADLs (bathing, care of mouth/teeth, toileting, grooming, dressing, etc )  - Assess/evaluate cause of self-care deficits   - Assess range of motion  - Assess patient's mobility; develop plan if impaired  - Assess patient's need for assistive devices and provide as appropriate  - Encourage maximum independence but intervene and supervise when necessary  - Involve family in performance of ADLs  - Assess for home care needs following discharge   - Consider OT consult to assist with ADL evaluation and planning for discharge  - Provide patient education as appropriate  Outcome: Progressing  Goal: Maintains/Returns to pre admission functional level  Description: INTERVENTIONS:  - Perform BMAT or MOVE assessment daily    - Set and communicate daily mobility goal to care team and patient/family/caregiver  - Collaborate with rehabilitation services on mobility goals if consulted  - Perform Range of Motion  times a day  - Reposition patient every  hours    - Dangle patient  times a day  - Stand patient  times a day  - Ambulate patient  times a day  - Out of bed to chair  times a day   - Out of bed for meals  times a day  - Out of bed for toileting  - Record patient progress and toleration of activity level   Outcome: Progressing  Goal: Patient will remain free of falls  Description: INTERVENTIONS:  - Educate patient/family on patient safety including physical limitations  - Instruct patient to call for assistance with activity   - Consult OT/PT to assist with strengthening/mobility   - Keep Call bell within reach  - Keep bed low and locked with side rails adjusted as appropriate  - Keep care items and personal belongings within reach  - Initiate and maintain comfort rounds  - Make Fall Risk Sign visible to staff  - Offer Toileting every  Hours, in advance of need  - Initiate/Maintain alarm  - Obtain necessary fall risk management equipment:   - Apply yellow socks and bracelet for high fall risk patients  - Consider moving patient to room near nurses station  Outcome: Progressing     Problem: DISCHARGE PLANNING  Goal: Discharge to home or other facility with appropriate resources  Description: INTERVENTIONS:  - Identify barriers to discharge w/patient and caregiver  - Arrange for needed discharge resources and transportation as appropriate  - Identify discharge learning needs (meds, wound care, etc )  - Arrange for interpretive services to assist at discharge as needed  - Refer to Case Management Department for coordinating discharge planning if the patient needs post-hospital services based on physician/advanced practitioner order or complex needs related to functional status, cognitive ability, or social support system  Outcome: Progressing     Problem: Knowledge Deficit  Goal: Patient/family/caregiver demonstrates understanding of disease process, treatment plan, medications, and discharge instructions  Description: Complete learning assessment and assess knowledge base    Interventions:  - Provide teaching at level of understanding  - Provide teaching via preferred learning methods  Outcome: Progressing     Problem: SAFETY,RESTRAINT: NV/NON-SELF DESTRUCTIVE BEHAVIOR  Goal: Remains free of harm/injury (restraint for non violent/non self-detsructive behavior)  Description: INTERVENTIONS:  - Instruct patient/family regarding restraint use   - Assess and monitor physiologic and psychological status   - Provide interventions and comfort measures to meet assessed patient needs   - Identify and implement measures to help patient regain control  - Assess readiness for release of restraint   Outcome: Progressing  Goal: Returns to optimal restraint-free functioning  Description: INTERVENTIONS:  - Assess the patient's behavior and symptoms that indicate continued need for restraint  - Identify and implement measures to help patient regain control  - Assess readiness for release of restraint   Outcome: Progressing     Problem: Potential for Falls  Goal: Patient will remain free of falls  Description: INTERVENTIONS:  - Educate patient/family on patient safety including physical limitations  - Instruct patient to call for assistance with activity   - Consult OT/PT to assist with strengthening/mobility   - Keep Call bell within reach  - Keep bed low and locked with side rails adjusted as appropriate  - Keep care items and personal belongings within reach  - Initiate and maintain comfort rounds  - Make Fall Risk Sign visible to staff  - Offer Toileting every  Hours, in advance of need  - Initiate/Maintain alarm  - Obtain necessary fall risk management equipment:   - Apply yellow socks and bracelet for high fall risk patients  - Consider moving patient to room near nurses station  Outcome: Progressing     Problem: Nutrition/Hydration-ADULT  Goal: Nutrient/Hydration intake appropriate for improving, restoring or maintaining nutritional needs  Description: Monitor and assess patient's nutrition/hydration status for malnutrition  Collaborate with interdisciplinary team and initiate plan and interventions as ordered  Monitor patient's weight and dietary intake as ordered or per policy  Utilize nutrition screening tool and intervene as necessary  Determine patient's food preferences and provide high-protein, high-caloric foods as appropriate       INTERVENTIONS:  - Monitor oral intake, urinary output, labs, and treatment plans  - Assess nutrition and hydration status and recommend course of action  - Evaluate amount of meals eaten  - Assist patient with eating if necessary   - Allow adequate time for meals  - Recommend/ encourage appropriate diets, oral nutritional supplements, and vitamin/mineral supplements  - Order, calculate, and assess calorie counts as needed  - Recommend, monitor, and adjust tube feedings and TPN/PPN based on assessed needs  - Assess need for intravenous fluids  - Provide specific nutrition/hydration education as appropriate  - Include patient/family/caregiver in decisions related to nutrition  Outcome: Progressing     Problem: NEUROSENSORY - ADULT  Goal: Achieves stable or improved neurological status  Description: INTERVENTIONS  - Monitor and report changes in neurological status  - Monitor vital signs such as temperature, blood pressure, glucose, and any other labs ordered   - Initiate measures to prevent increased intracranial pressure  - Monitor for seizure activity and implement precautions if appropriate      Outcome: Progressing  Goal: Achieves maximal functionality and self care  Description: INTERVENTIONS  - Monitor swallowing and airway patency with patient fatigue and changes in neurological status  - Encourage and assist patient to increase activity and self care     - Encourage visually impaired, hearing impaired and aphasic patients to use assistive/communication devices  Outcome: Progressing     Problem: CARDIOVASCULAR - ADULT  Goal: Maintains optimal cardiac output and hemodynamic stability  Description: INTERVENTIONS:  - Monitor I/O, vital signs and rhythm  - Monitor for S/S and trends of decreased cardiac output  - Administer and titrate ordered vasoactive medications to optimize hemodynamic stability  - Assess quality of pulses, skin color and temperature  - Assess for signs of decreased coronary artery perfusion  - Instruct patient to report change in severity of symptoms  Outcome: Progressing  Goal: Absence of cardiac dysrhythmias or at baseline rhythm  Description: INTERVENTIONS:  - Continuous cardiac monitoring, vital signs, obtain 12 lead EKG if ordered  - Administer antiarrhythmic and heart rate control medications as ordered  - Monitor electrolytes and administer replacement therapy as ordered  Outcome: Progressing     Problem: RESPIRATORY - ADULT  Goal: Achieves optimal ventilation and oxygenation  Description: INTERVENTIONS:  - Assess for changes in respiratory status  - Assess for changes in mentation and behavior  - Position to facilitate oxygenation and minimize respiratory effort  - Oxygen administered by appropriate delivery if ordered  - Initiate smoking cessation education as indicated  - Encourage broncho-pulmonary hygiene including cough, deep breathe, Incentive Spirometry  - Assess the need for suctioning and aspirate as needed  - Assess and instruct to report SOB or any respiratory difficulty  - Respiratory Therapy support as indicated  Outcome: Progressing     Problem: GASTROINTESTINAL - ADULT  Goal: Maintains or returns to baseline bowel function  Description: INTERVENTIONS:  - Assess bowel function  - Encourage oral fluids to ensure adequate hydration  - Administer IV fluids if ordered to ensure adequate hydration  - Administer ordered medications as needed  - Encourage mobilization and activity  - Consider nutritional services referral to assist patient with adequate nutrition and appropriate food choices  Outcome: Progressing  Goal: Maintains adequate nutritional intake  Description: INTERVENTIONS:  - Monitor percentage of each meal consumed  - Identify factors contributing to decreased intake, treat as appropriate  - Assist with meals as needed  - Monitor I&O, weight, and lab values if indicated  - Obtain nutrition services referral as needed  Outcome: Progressing     Problem: GENITOURINARY - ADULT  Goal: Urinary catheter remains patent  Description: INTERVENTIONS:  - Assess patency of urinary catheter  - If patient has a chronic mayers, consider changing catheter if non-functioning  - Follow guidelines for intermittent irrigation of non-functioning urinary catheter  Outcome: Progressing     Problem: METABOLIC, FLUID AND ELECTROLYTES - ADULT  Goal: Electrolytes maintained within normal limits  Description: INTERVENTIONS:  - Monitor labs and assess patient for signs and symptoms of electrolyte imbalances  - Administer electrolyte replacement as ordered  - Monitor response to electrolyte replacements, including repeat lab results as appropriate  - Instruct patient on fluid and nutrition as appropriate  Outcome: Progressing  Goal: Fluid balance maintained  Description: INTERVENTIONS:  - Monitor labs   - Monitor I/O and WT  - Instruct patient on fluid and nutrition as appropriate  - Assess for signs & symptoms of volume excess or deficit  Outcome: Progressing     Problem: SKIN/TISSUE INTEGRITY - ADULT  Goal: Skin Integrity remains intact(Skin Breakdown Prevention)  Description: Assess:  -Perform James assessment every   -Clean and moisturize skin every   -Inspect skin when repositioning, toileting, and assisting with ADLS  -Assess under medical devices such as  every   -Assess extremities for adequate circulation and sensation     Bed Management:  -Have minimal linens on bed & keep smooth, unwrinkled  -Change linens as needed when moist or perspiring  -Avoid sitting or lying in one position for more than  hours while in bed  -Keep HOB at degrees     Toileting:  -Offer bedside commode  -Assess for incontinence every   -Use incontinent care products after each incontinent episode such as     Activity:  -Mobilize patient  times a day  -Encourage activity and walks on unit  -Encourage or provide ROM exercises   -Turn and reposition patient every  Hours  -Use appropriate equipment to lift or move patient in bed  -Instruct/ Assist with weight shifting every  when out of bed in chair  -Consider limitation of chair time  hour intervals    Skin Care:  -Avoid use of baby powder, tape, friction and shearing, hot water or constrictive clothing  -Relieve pressure over bony prominences using   -Do not massage red bony areas    Next Steps:  -Teach patient strategies to minimize risks such as    -Consider consults to  interdisciplinary teams   Outcome: Progressing     Problem: HEMATOLOGIC - ADULT  Goal: Maintains hematologic stability  Description: INTERVENTIONS  - Assess for signs and symptoms of bleeding or hemorrhage  - Monitor labs  - Administer supportive blood products/factors as ordered and appropriate  Outcome: Progressing     Problem: MUSCULOSKELETAL - ADULT  Goal: Maintain or return mobility to safest level of function  Description: INTERVENTIONS:  - Assess patient's ability to carry out ADLs; assess patient's baseline for ADL function and identify physical deficits which impact ability to perform ADLs (bathing, care of mouth/teeth, toileting, grooming, dressing, etc )  - Assess/evaluate cause of self-care deficits   - Assess range of motion  - Assess patient's mobility  - Assess patient's need for assistive devices and provide as appropriate  - Encourage maximum independence but intervene and supervise when necessary  - Involve family in performance of ADLs  - Assess for home care needs following discharge   - Consider OT consult to assist with ADL evaluation and planning for discharge  - Provide patient education as appropriate  Outcome: Progressing  Goal: Maintain proper alignment of affected body part  Description: INTERVENTIONS:  - Support, maintain and protect limb and body alignment  - Provide patient/ family with appropriate education  Outcome: Progressing     Problem: COPING  Goal: Pt/Family able to verbalize concerns and demonstrate effective coping strategies  Description: INTERVENTIONS:  - Assist patient/family to identify coping skills, available support systems and cultural and spiritual values  - Provide emotional support, including active listening and acknowledgement of concerns of patient and caregivers  - Reduce environmental stimuli, as able  - Provide patient education  - Assess for spiritual pain/suffering and initiate spiritual care, including notification of Pastoral Care or [facility] based community as needed  - Assess effectiveness of coping strategies  Outcome: Progressing  Goal: Will report anxiety at manageable levels  Description: INTERVENTIONS:  - Administer medication as ordered  - Teach and encourage coping skills  - Provide emotional support  - Assess patient/family for anxiety and ability to cope  Outcome: Progressing

## 2022-03-07 NOTE — ASSESSMENT & PLAN NOTE
Bilateral laminae and spinous process fractures of C5 and C6   · S/p mechanical trip and fall with hyperextension injury into washing machine  · Large anterior cervical spine prevertebral hematoma with active extravasation s/p angiogram on presentation  · Intubated for airway protection in setting of large hematoma  · Current exam: Intubated and on sedation 2/2 to agitation  Imaging:  · Cerebral angiogram 2/26/2022: negative, no active identified hemorrhage  · MRI cervical spine wo, 2/26/2022: Slight widening of the anterior aspect of the C6-7 disc space with rupture of the anterior longitudinal ligament  The posterior longitudinal ligament and ligamentum flavum appear intact  Patient has known posterior element fractures at C5 and C6 incompletely evaluated on this examination  There is mild interspinous ligament injury in the posterior soft tissues extending from C2 through C5  Large prevertebral space hematoma anterior to the cervical spine as described above  · CT cervical spine wo, 2/26/2022: Extensive multilevel degenerative disc disease as above  Nondisplaced fractures in the lamina bilaterally and spinous process at C5 and C6  Displaced ossific focus anterior to the C6-7 disc space suggesting avulsed displaced anterior osteophyte  Large soft tissue abnormality anterior to the C6 and C7 likely represent prevertebral hematoma  Plan:  · Continue to monitor neuro exam closely  · Monitor Hgb  3/7: Hgb 9 1  · Pt had active extravasation on admission  Noted 2/28 with small retroperitoneal bleed, no intervention  · Maintain in cervical brace at all times  · Hold PT/OT secondary to instability  · Wean to extubate as able - continues with market tracheal edema  Hypoxic overnight on 3/6/22  · Maintain MAP > 85 mmHg  · Anticipate surgical intervention (C3/C4-T1 posterior decompression/fusion) once hemodynamically and medically stable  Pt with increased hypoxia overnight, not medically stable today  · DVT ppx: SCDs, heparin SQ  Neurosurgery will continue to follow closely  Please call with any questions or concerns

## 2022-03-07 NOTE — ANESTHESIA POSTPROCEDURE EVALUATION
Post-Op Assessment Note    CV Status:  Stable  Pain Score: 0    Pain management: adequate     Mental Status:  Sleepy and unresponsive   Hydration Status:  Euvolemic   PONV Controlled:  Controlled   Airway Patency:  Patent      Post Op Vitals Reviewed: Yes      Staff: CRNA, Anesthesiologist   Comments: Patient transported to ICU on vent and sedation  report given to ICU team, VSS        No complications documented      BP   112/58   Temp   98 9   Pulse  72   Resp   16   SpO2  85

## 2022-03-07 NOTE — ASSESSMENT & PLAN NOTE
+ Serratia and H  Flu in sputum cx from 3/2    Tmax 101 8 on 3/6/22,  Currently on Cefepime and Vanco

## 2022-03-07 NOTE — PROGRESS NOTES
[facility]  Progress Note - Anil Reid 1945, 64 y o  female MRN: [id number]  Unit/Bed#: ICU 09 Encounter: [id number]  Primary Care Provider: Krista Kong MD   Date and time admitted to hospital: 2/26/2022  5:25 PM    * Cervical spine fracture [facility])  Assessment & Plan  Bilateral laminae and spinous process fractures of C5 and C6   · S/p mechanical trip and fall with hyperextension injury into washing machine  · Large anterior cervical spine prevertebral hematoma with active extravasation s/p angiogram on presentation  · Intubated for airway protection in setting of large hematoma  · Current exam: Intubated and on sedation 2/2 to agitation  Imaging:  · Cerebral angiogram 2/26/2022: negative, no active identified hemorrhage  · MRI cervical spine wo, 2/26/2022: Slight widening of the anterior aspect of the C6-7 disc space with rupture of the anterior longitudinal ligament  The posterior longitudinal ligament and ligamentum flavum appear intact  Patient has known posterior element fractures at C5 and C6 incompletely evaluated on this examination  There is mild interspinous ligament injury in the posterior soft tissues extending from C2 through C5  Large prevertebral space hematoma anterior to the cervical spine as described above  · CT cervical spine wo, 2/26/2022: Extensive multilevel degenerative disc disease as above  Nondisplaced fractures in the lamina bilaterally and spinous process at C5 and C6  Displaced ossific focus anterior to the C6-7 disc space suggesting avulsed displaced anterior osteophyte  Large soft tissue abnormality anterior to the C6 and C7 likely represent prevertebral hematoma  Plan:  · Continue to monitor neuro exam closely  · Monitor Hgb  3/7: Hgb 9 1  · Pt had active extravasation on admission  Noted 2/28 with small retroperitoneal bleed, no intervention  · Maintain in cervical brace at all times    · Hold PT/OT secondary to instability  · Wean to extubate as able - continues with market tracheal edema  Hypoxic overnight on 3/6/22  · Maintain MAP > 85 mmHg  · Anticipate surgical intervention (C3/C4-T1 posterior decompression/fusion) once hemodynamically and medically stable  Pt with increased hypoxia overnight, not medically stable today  · DVT ppx: SCDs, heparin SQ  Neurosurgery will continue to follow closely  Please call with any questions or concerns  Hematoma of neck  Assessment & Plan  See above  Angiogram was negative  Tentative plan for tracheostomy today  Pneumonia  Assessment & Plan  + Serratia and H  Flu in sputum cx from 3/2  Tmax 101 8 on 3/6/22,  Currently on Cefepime and Vanco      Subjective/Objective     Chief Complaint: Pt intubated and sedated  Subjective: Pt intubated and sedated  Per report, pt with increased hypoxia overnight requiring FiO2 of 100%  Pt with Tmax of 101 8 per 24 hrs  Pt continues on antibiotics Cefepime and Vanco      Objective: Pt sedated  I/O       03/05 0701  03/06 0700 03/06 0701  03/07 0700 03/07 0701  03/08 0700    P  O        I V  (mL/kg) 913 7 (10 5) 1054 8 (12 1) 198 (2 3)    NG/      IV Piggyback 100 350     Feedings 840 900     Total Intake(mL/kg) 2433 7 (27 9) 2304 8 (26 5) 198 (2 3)    Urine (mL/kg/hr) 1620 (0 8) 785 (0 4) 70 (0 3)    Stool 0 0     Total Output 1620 785 70    Net +813 7 +1519 8 +128           Unmeasured Stool Occurrence 1 x 4 x           Invasive Devices  Report    Peripheral Intravenous Line            Peripheral IV 03/06/22 Dorsal (posterior); Left Hand <1 day    Peripheral IV 03/06/22 Right Antecubital <1 day          Arterial Line            Arterial Line 02/26/22 Left Radial 8 days          Drain            NG/OG/Enteral Tube Orogastric 16 Fr Left mouth 8 days    Urethral Catheter Temperature probe 1 day          Airway            ETT  Cuffed 6 mm -- days                Physical Exam:  Vitals: Blood pressure 114/55, pulse 84, temperature 100 °F (37 8 °C), resp  rate 22, height 5' 5" (1 651 m), weight 87 1 kg (192 lb 0 3 oz), SpO2 (!) 86 %  ,Body mass index is 31 95 kg/m²  Hemodynamic Monitoring: MAP: Arterial Line MAP (mmHg): 72 mmHg    General appearance:  Appears stated age  Head: Normocephalic  Eyes: PERRL, pupils about 3 mm bilat   Neck: supple, symmetrical,   Lungs: Hypoxia  Heart: regular heart rate  Neurologic:   Mental status: GCS 3T while on sedation(was increased 2/2 sedation and hypoxia  Also getting trach today)  Cranial nerves: Limited  Sensory: Limited  Motor: Limited  Reflexes:1+  Coordination: Limited      Lab Results:  Results from last 7 days   Lab Units 03/07/22  0520 03/06/22  0555 03/05/22  0527 03/04/22  0544 03/03/22  0506 03/02/22  1355 03/02/22  1355 03/02/22  0404 03/02/22  0404 03/01/22  0517 03/01/22  0517   WBC Thousand/uL 9 26 10 77* 10 66*   < > 17 27*   < > 10 62*   < > 9 29   < > 8 70   HEMOGLOBIN g/dL 9 1* 8 7* 8 1*   < > 9 4*   < > 10 1*   < > 9 0*   < > 9 2*   HEMATOCRIT % 28 7* 26 6* 25 5*   < > 27 9*   < > 29 7*   < > 26 6*   < > 26 4*   PLATELETS Thousands/uL 143* 119* 104*   < > 141*   < > 138*   < > 125*   < > 105*   NEUTROS PCT %  --   --   --   --   --   --   --   --  82*  --  87*   MONOS PCT %  --   --   --   --   --   --   --   --  6   < > 5   MONO PCT %  --   --  3*  --  17*  --  2*  --   --   --   --     < > = values in this interval not displayed       Results from last 7 days   Lab Units 03/07/22  0520 03/06/22  0555 03/05/22  0527   POTASSIUM mmol/L 4 1 4 1 4 0   CHLORIDE mmol/L 116* 116* 116*   CO2 mmol/L 24 24 25   BUN mg/dL 85* 56* 41*   CREATININE mg/dL 1 57* 1 32* 0 95   CALCIUM mg/dL 9 2 9 3 9 6     Results from last 7 days   Lab Units 03/07/22  0520 03/06/22  0555 03/05/22  0527   MAGNESIUM mg/dL 2 3 2 0 2 0     Results from last 7 days   Lab Units 03/07/22  0520 03/06/22  0555 03/05/22  0527   PHOSPHORUS mg/dL 4 4* 4 1 2 8         No results found for: TROPONINT  ABG:  Lab Results   Component Value Date    PHART 7 344 (L) 03/06/2022    CCG1JAZ 42 1 03/06/2022    PO2ART 68 2 (L) 03/06/2022    OGY4CEE 22 4 03/06/2022    BEART -3 1 03/06/2022    SOURCE Line, Arterial 03/06/2022       Imaging Studies: I have personally reviewed pertinent reports and I have personally reviewed pertinent films in PACS    EKG, Pathology, and Other Studies: I have personally reviewed pertinent reports  VTE Pharmacologic Prophylaxis: Heparin    VTE Mechanical Prophylaxis: sequential compression device    PLEASE NOTE:  This encounter may have been completed utilizing the [employer] Direct Speech Voice Recognition Software  Grammatical errors, random word insertions, pronoun errors and incomplete sentences are occasional consequences of the system due to software limitations, ambient noise and hardware issues  These may be missed by proof reading prior to affixing electronic signature  Any questions or concerns about the content, text or information contained within the body of this dictation should be directly addressed to the advanced practitioner or physician for clarification

## 2022-03-07 NOTE — RESPIRATORY THERAPY NOTE
RT Ventilator Management Note  Grayson Gene 64 y o  female MRN: [id number]  Unit/Bed#: ICU 09 Encounter: [id number]      Daily Screen       3/5/2022  0732 3/6/2022  0736          Patient safety screen outcome[de-identified] Failed Passed      Not Ready for Weaning due to[de-identified] FiO2 >60%;PEEP > 8cmH2O;Underline problem not resolved --      Spont breathing trial % for 30 min: -- Yes              Physical Exam:   Assessment Type: (P) Assess only  General Appearance: (P) Sedated  Respiratory Pattern: (P) Assisted  Chest Assessment: (P) Chest expansion symmetrical  Bilateral Breath Sounds: (P) Coarse,Rhonchi  Suction: (P) ET Tube  O2 Device: (P) vent      Resp Comments: (P) Pt kin current vent settings at this time, no changes made  Pt has been staying around 90% sats all night  Will cont to monitor pt per protocol

## 2022-03-07 NOTE — PROGRESS NOTES
Vancomycin IV Pharmacy-to-Dose Consultation    Anil Reid is a 64 y o  female who is currently receiving Vancomycin IV with management by the Pharmacy Consult service  Relevant clinical data and objective / subjective history reviewed  Vancomycin Assessment:  Indication: Pneumonia with persistent fever  Status: critically ill, leukocytosis improving, tmax 101 8  Micro:   3/6 Fungal culture (BAL): in process  3/6 Viral culture (BAL): in process  3/6 Bronchial culture: 1+ GNR  3/6 Legionella culture (BAL): in process  3/6 AFB Culture (BAL): in prcocess  3/6 Sputum culture: 2+ GPR  3/6 Blood cultures x 2: no growth at 24 hours  3/2 Blood cultures x 2: no growth at 72 hours  3/2 Sputum culture: 2+ growth of Haemophilus influenzae; 2+ growth of Serratia marcescens  3/2 MRSA culture: negative  Procalcitonin: 11 005 > 8 19 > 8 55 > 8 62  Renal Function: MILVIA, Scr 1 57 (from 0 95); UOP 0 4 mL/kg/hr  Days of Therapy: 2 from restart  Current Dose: 1000 mg IV q24h (last dose: 3/7 at 0140)  Goal Trough: 15-20 (appropriate for most indications)   Goal AUC(24h): 400-600    Vancomycin Plan:  New Dosing: change to 1000 mg IV daily PRN when random level is less than 20   Next Level: Random 3/8 at 0400  Renal Function Monitoring: Daily BMP and [place] [address] will continue to follow closely for s/sx of nephrotoxicity, infusion reactions and appropriateness of therapy  BMP and CBC will be ordered per protocol  We will continue to follow the patients culture results and clinical progress daily         Rae Staley, PharmD, [address] [facility] [facility] Clinical Pharmacist  [phone]

## 2022-03-07 NOTE — CASE MANAGEMENT
Case Management Discharge Planning Note    Patient name Grove Dus  Location ICU 09/ICU 09 MRN [id number]  : 1945 Date 3/7/2022       Current Admission Date: 2022  Current Admission Diagnosis:Cervical spine fracture [facility])   Patient Active Problem List    Diagnosis Date Noted    Pneumonia 2022    Acute respiratory failure ([facility] ) 2022    Acute blood loss anemia 2022    MILVIA (acute kidney injury) ([facility] ) 2022    Nasal bone fractures 2022    [facility]    Hematoma of neck 2022    BMI 29 0-29 9,adult 2020    Medicare annual wellness visit, initial 2019    Essential hypertension 2016      LOS (days): 9  Geometric Mean LOS (GMLOS) (days): 4 30  Days to GMLOS:-4 3     OBJECTIVE:  Risk of Unplanned Readmission Score: 15         Current admission status: Inpatient   Preferred Pharmacy:   [id number]- [facility] [address] [address]  Phone: [phone] Fax: [phone]    Primary Care Provider: Eva Bell MD    Primary Insurance: [facility]  Secondary Insurance:     DISCHARGE DETAILS:    Plan for [place] today in [address]  Pt will eventually have intervention with Nsg when appropriate     CM will continue to follow

## 2022-03-07 NOTE — RESPIRATORY THERAPY NOTE
RT Ventilator Management Note  Chela Anton 64 y o  female MRN: [id number]  Unit/Bed#: ICU 09 Encounter: [id number]      Daily Screen       3/6/2022  0736 3/7/2022  0825          Patient safety screen outcome[de-identified] Passed Failed      Not Ready for Weaning due to[de-identified] -- Underline problem not resolved      Spont breathing trial % for 30 min: Yes --              Physical Exam:   Assessment Type: Assess only  General Appearance: Sedated  Respiratory Pattern: Assisted  Chest Assessment: Chest expansion symmetrical  Bilateral Breath Sounds: Coarse  Suction: ET Tube  O2 Device: vent      Resp Comments: (P) Pt recieved on vent settings as documented  Pt was placed on vest  w/o incident

## 2022-03-08 PROBLEM — A41.9 SEPTIC SHOCK (HCC): Status: ACTIVE | Noted: 2022-01-01

## 2022-03-08 PROBLEM — R65.21 SEPTIC SHOCK (HCC): Status: ACTIVE | Noted: 2022-01-01

## 2022-03-08 PROBLEM — E87.2 NORMAL ANION GAP METABOLIC ACIDOSIS: Status: ACTIVE | Noted: 2022-01-01

## 2022-03-08 PROBLEM — J96.01 ACUTE RESPIRATORY FAILURE WITH HYPOXIA AND HYPERCAPNIA (HCC): Status: ACTIVE | Noted: 2022-01-01

## 2022-03-08 PROBLEM — E87.5 HYPERKALEMIA: Status: ACTIVE | Noted: 2022-01-01

## 2022-03-08 PROBLEM — J96.02 ACUTE RESPIRATORY FAILURE WITH HYPOXIA AND HYPERCAPNIA (HCC): Status: ACTIVE | Noted: 2022-01-01

## 2022-03-08 PROBLEM — A41.53 SERRATIA SEPSIS (HCC): Status: ACTIVE | Noted: 2022-01-01

## 2022-03-08 NOTE — PROGRESS NOTES
Spiritual Care Progress Note    3/8/2022  Patient: Mario Thomas : 1945  Admission Date & Time: 2022 1725  MRN: [id number] [facility]: [id number]      [facility]  Rigoberto Muñoz visited patient and family per family request  Geovanny Plata provided Last [address]  Spiritual care will remain available and reachable via [employer]       22 1600   Clinical Encounter Type   Visited With Patient and family together   Routine Visit Follow-up   Crisis Visit Critical Care   Spiritism Encounters   Spiritism Needs Prayer   Sacramental Encounters   Sacrament of Sick-Anointing Anointed   Sacrament Other Other (Comment)  (Last [address])

## 2022-03-08 NOTE — NURSING NOTE
Family at the bedside, Dr Doris Brown and Marcia Dubin in to speak with them,  called and in to pray with the family   Order noted for update level of care, patient is now transitioning to level 4 comfort care per family request

## 2022-03-08 NOTE — PLAN OF CARE

## 2022-03-08 NOTE — RESPIRATORY THERAPY NOTE
RT Ventilator Management Note  Nusrat Dao 64 y o  female MRN: [id number]  Unit/Bed#: ICU 09 Encounter: [id number]      Daily Screen       3/7/2022  0825 3/8/2022  0751          Patient safety screen outcome[de-identified] Failed Failed      Not Ready for Weaning due to[de-identified] Underline problem not resolved Underline problem not resolved              Physical Exam:   Assessment Type: Assess only  General Appearance: Sedated  Respiratory Pattern: Assisted  Chest Assessment: Chest expansion symmetrical  Bilateral Breath Sounds: Coarse (sl  coarse)  Cough: None  Suction: Trach  O2 Device: Ventilator      Resp Comments: pt remains on bilevel and veletri at this time  no changes made  will continue to monitor per protocol  vent disinfected  pt suctioned down ett

## 2022-03-08 NOTE — PROCEDURES
Central Line Insertion    Date/Time: 3/7/2022 7:32 PM  Performed by: José Manuel Paul DO  Authorized by: José Manuel Paul DO     Patient location:  ICU  Other Assisting Provider: Yes (comment) Veronica Jack)    Consent:     Consent obtained:  Written (Family)    Consent given by: Son  Risks discussed:  Arterial puncture, incorrect placement, nerve damage, pneumothorax, infection and bleeding    Alternatives discussed:  No treatment  Universal protocol:     Procedure explained and questions answered to patient or proxy's satisfaction: yes      Relevant documents present and verified: yes      Patient identity confirmed:  Arm band  Pre-procedure details:     Hand hygiene: Hand hygiene performed prior to insertion      Sterile barrier technique: All elements of maximal sterile technique followed      Skin preparation:  ChloraPrep    Skin preparation agent: Skin preparation agent completely dried prior to procedure    Indications:     Central line indications: medications requiring central line and hemodynamic monitoring    Procedure details:     Location:  Right femoral    Vessel type: vein      Laterality:  Right    Approach: percutaneous technique used      Patient position:  Flat    Catheter type:  Triple lumen    Landmarks identified: yes      Ultrasound guidance: yes      Ultrasound image availability:  Not saved    Sterile ultrasound techniques: Sterile gel and sterile probe covers were used      Number of attempts:  2    Successful placement: yes    Post-procedure details:     Post-procedure:  Dressing applied and line sutured    Assessment:  Blood return through all ports    Post-procedure complications: none      Patient tolerance of procedure:   Tolerated well, no immediate complications    Observer: Yes      Observer name:  Veronica Jack

## 2022-03-08 NOTE — PROGRESS NOTES
Spiritual Care Progress Note    3/8/2022  Patient: Rosina Slaughter : 1945  Admission Date & Time: 2022 1725  MRN: [id number] [facility]: [id number]       visited patient "Nicole Mota" and family per nurse referral for comfort care measures   provided grief and bereavement resources via "Comfort Tote," provided prayer, and made referral to [facility] Olson per request of pt's son for Last Rites  Family expressed gratitude for the visit, told stories about patient  Spiritual care will remain available  Chaplaincy Interventions Utilized:   Normalized experience of patient/family and Provided grief counseling    Facilitated story telling    Consulted with interdisciplinary team    Cultivated a relationship of care and support    Provided prayer, Provided [facility] resources and Read sacred text      Chaplaincy Outcomes Achieved:  Developed chaplaincy care plan and Expressed gratitude      Spiritual Coping Strategies Utilized:   Spiritual grieving     22 1400   Clinical Encounter Type   Visited With Patient and family together   Routine Visit Follow-up   Referral From Nurse   Referral To    Buddhism Encounters   Buddhism Needs Prayer; [facility] Text

## 2022-03-08 NOTE — CONSULTS
Vancomycin IV Pharmacy-to-Dose Consultation    Carisa Muñiz is a 64 y o  female who was receiving Vancomycin IV with management by the Pharmacy Consult service for treatment of Pneumonia    The patients Vancomycin therapy has been completed  Thank you for allowing us to take part in this patient's care  Pharmacy will sign-off now; please call or re-consult if there are any questions          Keesha Raymundo, PharmD, 4 [facility] Clinical Pharmacist  [phone]

## 2022-03-08 NOTE — PLAN OF CARE
Problem: DECISION MAKING  Goal: Pt/Family able to effectively weigh alternatives and participate in decision making related to treatment and care  Description: INTERVENTIONS:  - Identify decision maker  - Determine when there are differences among patient's view, family's view, and healthcare provider's view of patient condition and care goals  - Facilitate patient/family articulation of goals for care  - Help patient/family identify pros/cons of alternative solutions  - Provide information as requested by patient/family  - Respect patient/family rights related to privacy and sharing information   - Serve as a liaison between patient, family and health care team  - Initiate consults as appropriate (Ethics Team, Palliative Care, Family Care Conference, etc )  Outcome: Progressing     Problem: CONFUSION/THOUGHT DISTURBANCE  Goal: Thought disturbances (confusion, delirium, depression, dementia or psychosis) are managed to maintain or return to baseline mental status and functional level  Description: INTERVENTIONS:  - Assess for possible contributors to  thought disturbance, including but not limited to medications, infection, impaired vision or hearing, underlying metabolic abnormalities, dehydration, respiratory compromise,  psychiatric diagnoses and notify attending PHYSICAN/AP  - Monitor and intervene to maintain adequate nutrition, hydration, elimination, sleep and activity  - Decrease environmental stimuli, including noise as appropriate  - Provide frequent contacts to provide refocusing, direction and reassurance as needed  Approach patient calmly with eye contact and at their level    - [place] high risk fall precautions, aspiration precautions and other safety measures, as indicated  - If delirium suspected, notify physician/AP of change in condition and request immediate in-person evaluation  - Pursue consults as appropriate including Geriatric (campus dependent), OT for cognitive evaluation/activity planning, psychiatric, pastoral care, etc   Outcome: Progressing

## 2022-03-08 NOTE — RESPIRATORY THERAPY NOTE
RT Ventilator Management Note  Mario Thomas 64 y o  female MRN: [id number]  Unit/Bed#: ICU 09 Encounter: [id number]      Daily Screen       3/6/2022  0736 3/7/2022  0825          Patient safety screen outcome[de-identified] Passed Failed      Not Ready for Weaning due to[de-identified] -- Underline problem not resolved      Spont breathing trial % for 30 min: Yes --              Physical Exam:   Assessment Type: Assess only  General Appearance: Sedated  Respiratory Pattern: Assisted  Chest Assessment: Chest expansion symmetrical  Bilateral Breath Sounds: Coarse (sl  coarse)  Cough: None  Suction: Trach  O2 Device: Ventilator      Resp Comments: Pt  remains on Bilevel mode with Veletri  No changes at this time  Will continue to assess and monitor pt per protocol

## 2022-03-08 NOTE — PROGRESS NOTES
Daily Progress Note - [address] 64 y o  female MRN: [id number]  Unit/Bed#: ICU 09 Encounter: [id number]        ----------------------------------------------------------------------------------------  HPI/24hr events: Trach performed yesterday  Overnight patient persistently hypoxic to low 80s SpO2  Placed in APRV with limited success, occasionally satting as high as 90% in this mode  POC lung US performed that did not show evidence of pneumothorax  Increasing hypotension requiring escalating pressor support, at one point on high amounts of levo, in addition to ephraim and vaso  Have since weaned ephraim off  Central line in right fem placed due to pressor needs  Patient also febrile with a tmax of 101 1F at this point in time  ---------------------------------------------------------------------------------------  SUBJECTIVE  Unable to assess due to intubation      Review of Systems   Unable to perform ROS: Intubated     ---------------------------------------------------------------------------------------  Assessment and Plan:    Neuro:    Diagnosis: Non displaced fractures of C5/C6 lamina and spinous process, C2-C5 interspinous ligament strain with paravertebral hematoma  o Plan:   - Anticipate neurosurgical intervention, however given current patient hemodynamic instability and ongoing pneumonia will wait until able to further optimize patient from a medical standpoint  - MAP goal >85, SBP <160, currently on levo, vaso  - Cervical brace at all times  - Continue to monitor neuro exam closely  - Neurosurgery following, appreciate recs   Diagnosis: Analgesia/sedation  o Plan:   - Precedex, dilaudid gtt  - Prn dilaudid      CV:    Diagnosis: Shock, undifferentiated  o Plan:  - Increasing pressor needs over night, continue to wean as tolerated while maintaining BP goals as above  - Holding home antihypertensives in this setting      Pulm:   Diagnosis: Acute hypoxic respiratory failure requiring mechanical ventilation  o Plan:   - Likely multifactorial, initially intubated for airway protection with paravertebral hematoma and active extravasation, more recently pneumonia likely playing a role in worsening respiratory status and hypoxia  - Trach placed 3/7  - Increasing vent needs overnight, changed to APRV for further recruitment, continue to wean as tolerated  - q4 ABGs  - Velitri added, continue  - Ordered nimbex however never started as patient seems to be improving, could consider if clinically indicated  - POC lung US did not demonstrate pneumothorax  - S/p bronch 3/6, could consider repeat  GI:    Diagnosis: Stress ulcer ppx, bowel regimen  o Plan:   - Consider adding       :    Diagnosis: Acute renal failure  o Plan:   - Likely in the setting of worsening respiratory status, hypoperfusion  - Cr consistently uptrending most recent 2 53 from baseline of 0 8  - Minimal UOP  - Consider dialysis this morning      F/E/N:    Plan:    F- none   E- monitor and replete as needed   N- NPO, consider restarting TFs      Heme/Onc:    Diagnosis: Anemia  o Plan:   - Likely in the setting of acute blood loss, prevertebral hematoma and retroperitoneal hematoma  - 3 U pRBC this admission  - Trend Hgb, has been stable as of late, consider transfusing if Hgb <7   Diagnosis: DVT ppx  o Plan:   - SQH, SCDs      Endo:    Diagnosis: No active issues  o Plan:   - Monitor BG on BMPs      ID:    Diagnosis: Pneumonia  o Plan:   - Sputum culture 3/2 with 2+ H influenza, 2+ serratia  - Repeat bcx 3/6 pending  - Bronchial cultures 3/6 pending  - Legionella pending  - 3/6 AFB negative  - 3/6 sputum 2+ gram pos rods, 2+ polys  - Currently on vanco, cefepime  - Monitor WBC which has been uptrending  - Monitor temp, intermittently febrile      MSK/Skin:    Diagnosis: Non displaced left 3rd rib fracture  o Plan:   - Analgesia as outlined above    o Plan:   - Routine skin care  - Frequent turning    Other:   Diagnosis: Goals of care  o Plan:  - Consider palliative consult today    Patient appropriate for transfer out of the ICU today?: No  Disposition: Continue Critical Care   Code Status: Level 1 - Full Code  ---------------------------------------------------------------------------------------  ICU CORE MEASURES    Prophylaxis   VTE Pharmacologic Prophylaxis: Heparin  VTE Mechanical Prophylaxis: sequential compression device  Stress Ulcer Prophylaxis: consider adding today    ABCDE Protocol (if indicated)  Plan to perform spontaneous awakening trial today? No  Plan to perform spontaneous breathing trial today? No  Obvious barriers to extubation? Yes    Invasive Devices Review  Invasive Devices  Report    Central Venous Catheter Line            CVC Central Lines 22 <1 day          Peripheral Intravenous Line            Peripheral IV 22 Dorsal (posterior); Left Hand 1 day    Peripheral IV 22 Right Antecubital 1 day    Peripheral IV 22 Right Hand <1 day          Arterial Line            Arterial Line 22 Left Radial 9 days          Drain            Urethral Catheter Temperature probe 2 days          Airway            Surgical Airway Shiley Fenestrated;Cuffed <1 day              Can any invasive devices be discontinued today?  No  ---------------------------------------------------------------------------------------  OBJECTIVE    Vitals   Vitals:    22 2215 22 2300 22 2317 22 0000   BP:  156/73 165/51 (!) 121/49   BP Location:       Pulse: 98 96 98 94   Resp:  12 12   Temp: 100 4 °F (38 °C) (!) 100 8 °F (38 2 °C) (!) 100 8 °F (38 2 °C) (!) 101 1 °F (38 4 °C)   TempSrc:       SpO2: 93% (!) 84% (!) 83% (!) 80%   Weight:       Height:         Temp (24hrs), Av 8 °F (37 7 °C), Min:99 3 °F (37 4 °C), Max:101 1 °F (38 4 °C)  Current: Temperature: (!) 101 1 °F (38 4 °C)    Invasive/non-invasive ventilation settings   Respiratory  Report   Lab Data (Last 4 hours)       5027 pH, Arterial       7 366             pCO2, Arterial       32 4             pO2, Arterial       54 3             HCO3, Arterial       18 1             Base Excess, Arterial       -6 3                  O2/Vent Data     None                Physical Exam  Vitals and nursing note reviewed  Constitutional:       Appearance: She is ill-appearing  Interventions: She is sedated and intubated  Cervical collar in place  HENT:      Head: Normocephalic and atraumatic  Right Ear: External ear normal       Left Ear: External ear normal       Nose: Nose normal       Mouth/Throat:      Mouth: Mucous membranes are moist    Eyes:      General: No scleral icterus  Right eye: No discharge  Left eye: No discharge  Pupils: Pupils are equal, round, and reactive to light  Cardiovascular:      Rate and Rhythm: Normal rate and regular rhythm  Heart sounds: Normal heart sounds  No murmur heard  No friction rub  No gallop  Pulmonary:      Effort: She is intubated  Breath sounds: Examination of the right-middle field reveals decreased breath sounds  Examination of the left-middle field reveals decreased breath sounds  Examination of the right-lower field reveals decreased breath sounds  Examination of the left-lower field reveals decreased breath sounds  Decreased breath sounds present  Comments: Coarse breath sounds  Abdominal:      General: Abdomen is flat  Bowel sounds are normal  There is no distension  Palpations: Abdomen is soft  There is no mass  Musculoskeletal:      Right lower leg: No edema  Left lower leg: No edema  Skin:     General: Skin is warm and dry     Neurological:      Comments: Patient heavily sedated, w/d to pain in all extremities, not opening eyes or following commands             Laboratory and Diagnostics:  Results from last 7 days   Lab Units 03/07/22  2305 03/07/22  1221 03/07/22  1022 03/07/22  0520 03/06/22  0555 03/05/22  9736 03/04/22  0544 03/03/22  0506 03/03/22  0506 03/02/22  1355 03/02/22  1355 03/02/22  0404 03/02/22  0404 03/01/22  0517 03/01/22  0517   WBC Thousand/uL 16 28*  --   --  9 26 10 77* 10 66* 14 59*  --  17 27*  --  10 62*   < > 9 29   < > 8 70   HEMOGLOBIN g/dL 9 9*  --   --  9 1* 8 7* 8 1* 8 3*   < > 9 4*   < > 10 1*   < > 9 0*   < > 9 2*   I STAT HEMOGLOBIN g/dl  --  9 2* 9 5*  --   --   --   --   --   --   --   --   --   --   --   --    HEMATOCRIT % 30 7*  --   --  28 7* 26 6* 25 5* 26 0*   < > 27 9*   < > 29 7*   < > 26 6*   < > 26 4*   HEMATOCRIT, ISTAT %  --  27* 28*  --   --   --   --   --   --   --   --   --   --   --   --    PLATELETS Thousands/uL 236  --   --  143* 119* 104* 114*  --  141*  --  138*   < > 125*   < > 105*   NEUTROS PCT %  --   --   --   --   --   --   --   --   --   --   --   --  82*  --  87*   BANDS PCT % 14*  --   --   --   --  13*  --   --  7  --  2  --   --   --   --    MONOS PCT %  --   --   --   --   --   --   --   --   --   --   --   --  6  --  5   MONO PCT % 1*  --   --   --   --  3*  --   --  17*  --  2*  --   --   --   --     < > = values in this interval not displayed       Results from last 7 days   Lab Units 03/07/22  2305 03/07/22  1649 03/07/22  1221 03/07/22  1022 03/07/22  0520 03/06/22  0555 03/05/22  0527 03/04/22  0544 03/04/22  0544 03/03/22  1423 03/03/22  1423   SODIUM mmol/L 139 140  --   --  142 145 142  --  142  --  143   POTASSIUM mmol/L 4 8 4 7  --   --  4 1 4 1 4 0  --  4 2  --  3 6   CHLORIDE mmol/L 113* 113*  --   --  116* 116* 116*  --  118*  --  115*   CO2 mmol/L 20* 23  --   --  24 24 25   < > 24   < > 24   CO2, I-STAT mmol/L  --   --  26 26  --   --   --   --   --   --   --    ANION GAP mmol/L 6 4  --   --  2* 5 1*  --  0*  --  4   BUN mg/dL 107* 90*  --   --  85* 56* 41*  --  36*  --  37*   CREATININE mg/dL 2 35* 1 91*  --   --  1 57* 1 32* 0 95  --  1 00  --  0 90   CALCIUM mg/dL 9 8 9 4  --   --  9 2 9 3 9 6  --  9 1  --  9 0   GLUCOSE RANDOM mg/dL 128 119 --   --  130 144* 175*  --  118  --  162*    < > = values in this interval not displayed  Results from last 7 days   Lab Units 03/07/22  0520 03/06/22  0555 03/05/22  0527 03/04/22  0544 03/03/22  1423 03/03/22  0506 03/02/22  0404   MAGNESIUM mg/dL 2 3 2 0 2 0 2 0 2 0 2 2 2 3   PHOSPHORUS mg/dL 4 4* 4 1 2 8 2 1* 2 3 3 0 2 8               Results from last 7 days   Lab Units 03/02/22  1840 03/02/22  1355   LACTIC ACID mmol/L 1 9 2 4*     ABG:  Results from last 7 days   Lab Units 03/07/22  2305   PH ART  7 366   PCO2 ART mm Hg 32 4*   PO2 ART mm Hg 54 3*   HCO3 ART mmol/L 18 1*   BASE EXC ART mmol/L -6 3   ABG SOURCE  Line, Arterial     VBG:  Results from last 7 days   Lab Units 03/07/22  2305   ABG SOURCE  Line, Arterial     Results from last 7 days   Lab Units 03/06/22  0555 03/06/22  0057 03/04/22  0544 03/03/22  0913   PROCALCITONIN ng/ml 8 62* 8 55* 8 19* 11 05*       Micro  Results from last 7 days   Lab Units 03/06/22  1046 03/06/22  0553 03/06/22  0055 03/02/22  1354 03/02/22  1353 03/02/22  1352 03/02/22  1351   BLOOD CULTURE   --   --  No Growth at 24 hrs  No Growth at 24 hrs  No Growth After 5 Days  No Growth After 5 Days  --   --    SPUTUM CULTURE   --  Culture too young- will reincubate  --   --   --  2+ Growth of Haemophilus influenzae*  2+ Growth of Serratia marcescens*  2+ Growth of   --    GRAM STAIN RESULT  2+ Polys*  1+ Gram negative rods* 2+ Gram positive rods*  2+ Polys*  --   --   --  4+ Polys*  Rare Gram positive rods*  Rare Gram negative rods*  Rare Gram positive cocci in pairs*  --    MRSA CULTURE ONLY   --   --  No Methicillin Resistant Staphlyococcus aureus (MRSA) isolated  --   --   --  No Methicillin Resistant Staphlyococcus aureus (MRSA) isolated         Intake and Output  I/O       03/06 0701  03/07 0700 03/07 0701  03/08 0700    P  O   0    I V  (mL/kg) 1054 8 (12 1) 1335 5 (15 3)    NG/GT  120    IV Piggyback 350     Feedings 900     Total Intake(mL/kg) 2304 8 (26 5) 1455 5 (16 7)    Urine (mL/kg/hr) 785 (0 4) 221 (0 1)    Stool 0     Total Output 785 221    Net +1519 8 +1234 5          Unmeasured Stool Occurrence 4 x           Height and Weights   Height: 5' 5" (165 1 cm)     Body mass index is 31 95 kg/m²  Weight (last 2 days)     Date/Time Weight    03/07/22 1400 87 1 (192)    03/07/22 1028 --    Comment rows:   OBSERV: Patient intubated, direct transfer to or bypassing [facility] to [facility] at 03/07/22 1028           Nutrition       Diet Orders   (From admission, onward)             Start     Ordered    03/07/22 0001  Diet NPO  Diet effective midnight        References:    Nutrtion Support Algorithm Enteral vs  Parenteral   Question Answer Comment   Diet Type NPO    RD to adjust diet per protocol?  No        03/06/22 0946                  Active Medications  Scheduled Meds:  Current Facility-Administered Medications   Medication Dose Route Frequency Provider Last Rate    acetaminophen  975 mg Oral Q8H [facility] Isrrael Lam MD      cisatracurium (NIMBEX) in 0 9% sodium chloride infusion  0 1-5 mcg/kg/min Intravenous Titrated Wali Low DO Stopped (03/07/22 2043)    And    artificial tear   Both Eyes Q2H Cristino Mealing Rand Nichols DO      cefepime  1,000 mg Intravenous Q12H Isrrael Lam MD 1,000 mg (03/08/22 0039)    chlorhexidine  15 mL Mouth/Throat Q12H [address] Miranda MD      dexmedetomidine  0 1-1 4 mcg/kg/hr Intravenous Titrated Yennifer Macleod, PA-C 0 4 mcg/kg/hr (03/07/22 1433)    epoprostenol  6 25-50 ng/kg/min (Ideal) Inhalation Titrated Saige Rideau, CRNP 50 ng/kg/min (03/07/22 1829)    heparin (porcine)  5,000 Units Subcutaneous Q8H [facility] ETIENNE Jeronimo      HYDROmorphone  1 mg/hr Intravenous Continuous Delta Macleod, PA-C 1 mg/hr (03/08/22 0039)    HYDROmorphone  0 5 mg Intravenous Q3H PRN Isrrael Lam MD      Labetalol HCl  10 mg Intravenous Q6H PRN Isrrael Lam MD      multi-electrolyte  500 mL Intravenous Once Lamar Antonio ETIENNE Almazan Stopped (03/07/22 1827)    norepinephrine  1-30 mcg/min Intravenous Titrated Judy  Gambia, DO 24 mcg/min (03/07/22 2331)    phenylephine   mcg/min Intravenous Titrated 811 Alfredo Edison, DO Stopped (03/07/22 2317)    vancomycin  15 mg/kg (Adjusted) Intravenous Daily PRN Kesha Nunez MD      vasopressin  0 04 Units/min Intravenous Continuous Judy [facility] Rell, DO 0 04 Units/min (03/07/22 2157)     Continuous Infusions:  cisatracurium (NIMBEX) in 0 9% sodium chloride infusion, 0 1-5 mcg/kg/min, Last Rate: Stopped (03/07/22 2043)  dexmedetomidine, 0 1-1 4 mcg/kg/hr, Last Rate: 0 4 mcg/kg/hr (03/07/22 1433)  epoprostenol, 6 25-50 ng/kg/min (Ideal), Last Rate: 50 ng/kg/min (03/07/22 1829)  HYDROmorphone, 1 mg/hr, Last Rate: 1 mg/hr (03/08/22 0039)  norepinephrine, 1-30 mcg/min, Last Rate: 24 mcg/min (03/07/22 2331)  phenylephine,  mcg/min, Last Rate: Stopped (03/07/22 2317)  vasopressin, 0 04 Units/min, Last Rate: 0 04 Units/min (03/07/22 2157)      PRN Meds:   HYDROmorphone, 0 5 mg, Q3H PRN  Labetalol HCl, 10 mg, Q6H PRN  vancomycin, 15 mg/kg (Adjusted), Daily PRN        Allergies   No Known Allergies  ---------------------------------------------------------------------------------------  Advance Directive and Living Will:      Power of :    POLST:    ---------------------------------------------------------------------------------------  Care Time Delivered:   No Critical Care time spent     [place], DO      Portions of the record may have been created with voice recognition software  Occasional wrong word or "sound a like" substitutions may have occurred due to the inherent limitations of voice recognition software    Read the chart carefully and recognize, using context, where substitutions have occurred

## 2022-03-08 NOTE — CONSULTS
Consultation - Nephrology   Peggy Gallego 64 y o  female MRN: [id number]  Unit/Bed#: ICU 09 Encounter: [id number]    ASSESSMENT and PLAN:  Acute kidney injury:  Etiology:   Initial acute kidney injury with creatinine 1 74 on 02/28/2022-likely secondary to possible rhabdo in the setting of fall, TAPAN with contrast exposure on 02/26/2022, relative hypotension as well as outpatient medications to include losartan, HCTZ  · Creatinine improved baseline 01/31/2022 at 1 01  Second AK I insult:   With creatinine initial rise on 03/06/2022 with creatinine 1 32 and now currently 2 67-suspect secondary to hemodynamic compromise with perturbations of blood pressure, shock, infection with pneumonia, possible AIN with antibiotic use, further contrast exposure, TAPAN on 03/02/2022 in the setting of recent MILVIA-leading to ATN  Assessment:   Admission creatinine 1 0 on 02/26/2022   Status post intermittent diuretic use-last dose yesterday   Urinary output-marginal   Continues on pressors   No CPK obtained status post fall  Workup:   Urinalysis with micro 03/05/2022 reports:  Turbid, moderate blood, plus two protein, 2-4 RBCs, 2-4 WBCs, 25-50 granular casts   CT scan of abdomen and pelvis 02/28/2022 reports:  Normal size and position of kidneys no hydronephrosis or calcified stones  Plan:   Strict I/Os, daily weights   Avoid nephrotoxins, NSAIDs, IV contrast if possible   Avoid ongoing hypotension   Adjust meds to appropriate GFR   Optimize hemodynamic status to avoid delay in renal recovery   Will check CPK, urine EOS   Patient requires multiple pressors for blood pressure control   If continue to deteriorate likely will require CRRT therapy-discussed with primary team   Primary team to discuss ongoing care with family prior to CRRT consideration    Blood pressure/Hypertension:  Actually hypotension  Assessment and Plan:   Variations of blood pressure requiring pressors   Home medications:  Losartan 50 mg daily, Toprol XL 50 mg daily, HCTZ 25 mg daily   Current medications:  Levophed, Marcelino-Synephrine IV drips   Maximize hemodynamics to maintain MAP >65   Avoid hypotension or fluctuations in blood pressure   Will continue to trend    Azotemia:  Worsening  Assessment AND plan:  · Likely in the setting of worsening renal function  · Likely would benefit from CRRT therapy      Acute anemia in the setting of fall  Assessment and Plan:   Current hemoglobin 9 5   Status post prior blood transfusions   Per primary team   Transfuse for Hgb <7 0    Acid-base:  Metabolic acidosis  Assessment and Plan:   Current bicarb 21   Likely in the setting of worsening renal function   Monitor for now however likely will require CRRT     Electrolytes:  Assessment and Plan:  · Currently within normal limits  · Will continue monitor and treat as needed      Status post fall:    Complicated by cervical spine fracture fractures of C5 and C6 with arterial extubation within hematoma  · Currently in a cervical collar  · Requires surgical intervention-neuro surgery following-not stable at this time  Acute hypoxic respiratory failure/pneumonia  · Tracheostomy  · Remains on ventilator  · Trauma following  · On IV antibiotics with vancomycin and cefepime    HISTORY OF PRESENT ILLNESS:  Requesting Physician: Jannie Arroyo DO  Reason for Consult:  Acute kidney injury worsening renal function concern for RRT need    Fausto Mcihel is a 64 y o  female with past medical history of hypertension who presented initially to [facility] status post fall after tripping or striking her head resulting cervical spine fracture fractures of C5 and C6 with arterial extubation within hematoma  She ultimately required intubation for airway management   Hospitalization has been complicated by pneumonia currently on IV antibiotics, shock requiring pressors, acute hypoxic respiratory failure requiring ongoing mechanical ventilation and tracheostomy and acute kidney injury  A renal consultation is requested today for assistance in the management of acute kidney injury and concern for dialysis need  Patient is sedated intubated with tracheostomy unable to perform review of systems  All information is obtained through the chart  Discussed with primary team at bedside  Patient has required ongoing fluid resuscitation for hypotension and pressors  Continues with ongoing fevers despite IV antibiotics  Urinary output decreasing despite intermittent IV diuretics  PAST MEDICAL HISTORY:  Past Medical History:   Diagnosis Date    Hypertension        PAST SURGICAL HISTORY:  Past Surgical History:   Procedure Laterality Date    IR CEREBRAL ANGIOGRAPHY / INTERVENTION  2/26/2022    NO PAST SURGERIES      MA COLONOSCOPY FLX DX W/COLLJ SPEC WHEN PFRMD N/A 2/2/2017    Procedure: COLONOSCOPY;  Surgeon: Wali Hollis MD;  Location: [facility];   Service: Gastroenterology       ALLERGIES:  No Known Allergies    SOCIAL HISTORY:  Social History     Substance and Sexual Activity   Alcohol Use No     Social History     Substance and Sexual Activity   Drug Use No     Social History     Tobacco Use   Smoking Status Former Smoker   Smokeless Tobacco Never Used       FAMILY HISTORY:  Family History   Family history unknown: Yes       MEDICATIONS:    Current Facility-Administered Medications:     acetaminophen (TYLENOL) tablet 975 mg, 975 mg, Oral, Q8H [address], Jemima Delgadillo MD, 975 mg at 03/08/22 0538    cefepime (MAXIPIME) 1,000 mg in dextrose 5 % 50 mL IVPB, 1,000 mg, Intravenous, Q12H, Jemima Delgadillo MD, Stopped at 03/08/22 0109    chlorhexidine (PERIDEX) 0 12 % oral rinse 15 mL, 15 mL, Mouth/Throat, Q12H [address] MD, 15 mL at 03/08/22 0850    dexmedeTOMIDine (Precedex) 400 mcg in sodium chloride 0 9% 100 mL, 0 1-1 4 mcg/kg/hr, Intravenous, Titrated, Hamzah Mcdowell PA-C, Last Rate: 8 7 mL/hr at 03/08/22 0317, 0 4 mcg/kg/hr at 03/08/22 0317    epoprostenol (VELETRI) 30,000 ng/mL in sodium chloride 0 9 % 50 mL inhalation solution, 6 25-50 ng/kg/min (Ideal), Inhalation, Titrated, ETIENNE Ohara, Last Rate: 5 7 mL/hr at 03/08/22 0253, 50 ng/kg/min at 03/08/22 0253    famotidine (PEPCID) injection 20 mg, 20 mg, Intravenous, Q24H [address], Larena Peak, DO, 20 mg at 03/08/22 0851    heparin (porcine) subcutaneous injection 5,000 Units, 5,000 Units, Subcutaneous, Q8H [address], ETIENNE Ohara, 5,000 Units at 03/08/22 0538    hydrocortisone sodium succinate (PF) (Solu-CORTEF) injection 50 mg, 50 mg, Intravenous, Q6H RAMÍREZ, ETIENNE Frias    HYDROmorphone (DILAUDID) 50 mg in sodium chloride 0 9% 50mL drip, 1 mg/hr, Intravenous, Continuous, Roma Frederick PA-C, Last Rate: 1 mL/hr at 03/08/22 0039, 1 mg/hr at 03/08/22 0039    HYDROmorphone (DILAUDID) injection 0 5 mg, 0 5 mg, Intravenous, Q3H PRN, Wilkie Leventhal, MD    neomycin-polymyxin B (NEOSPORIN-) 1 mL in sodium chloride 0 9 % 1,000 mL irrigation, , Irrigation, Once, Bradford Uribe MD    norepinephrine (LEVOPHED) 8 mg (DOUBLE CONCENTRATION) IV in sodium chloride 0 9% 250 mL, 1-30 mcg/min, Intravenous, Titrated, Larena Peak, DO, Last Rate: 37 5 mL/hr at 03/08/22 0623, 20 mcg/min at 03/08/22 6879    phenylephrine (JONATHAN-SYNEPHRINE) 50 mg (STANDARD CONCENTRATION) in sodium chloride 0 9% 250 mL,  mcg/min, Intravenous, Titrated, Larena Peak, DO, Stopped at 03/07/22 2317    polyethylene glycol (MIRALAX) packet 17 g, 17 g, Oral, Daily PRN, Larena Peak, DO    senna-docusate sodium (SENOKOT S) 8 6-50 mg per tablet 1 tablet, 1 tablet, Oral, HS PRN, Larena Peak, DO    vasopressin (PITRESSIN) 20 Units in sodium chloride 0 9 % 100 mL infusion, 0 04 Units/min, Intravenous, Continuous, Larena Peak, DO, Last Rate: 12 mL/hr at 03/08/22 0500, 0 04 Units/min at 03/08/22 0500    REVIEW OF SYSTEMS:  Patient is sedated and intubated-unable to perform review of systems  All information is obtained through the chart      PHYSICAL EXAM:  Current Weight: Weight - Scale: 90 kg (198 lb 6 6 oz)  First Weight: Weight - Scale: 73 kg (160 lb 15 oz)  Vitals:    03/08/22 0700 03/08/22 0751 03/08/22 0800 03/08/22 0900   BP: 120/61  124/67 132/66   BP Location:       Pulse: 82  82 80   Resp: (!) 11  12 (!) 11   Temp: 100 4 °F (38 °C)  (!) 100 8 °F (38 2 °C) (!) 100 8 °F (38 2 °C)   TempSrc:       SpO2: 95% 93% 92% 94%   Weight:       Height:           Intake/Output Summary (Last 24 hours) at 3/8/2022 1001  Last data filed at 3/8/2022 0911  Gross per 24 hour   Intake 1831 01 ml   Output 206 ml   Net 1625 01 ml     General:  No acute distress, ill-appearing, sedated and on ventilator  Skin:  Facial ecchymosis, no rash  ENT:  Dry lips and mucous membranes, tracheostomy in place, cervical collar in place  Chest:  Coarse breath sounds, decreased throughout, intubated with tracheostomy  CVS:  Regular rate and rhythm without murmur, gallop or rub    Generalized anasarca  Abdomen:  Soft, nontender, non-distended, Normal bowel sounds x 4 quadrants  Extremities:  Generalized anasarca,   Neuro:  Sedated-unable to assess  Psych:  Sedated intubated-unable to assess  :  Hayes catheter in place patent for small amount of clear lily urine      Invasive Devices:   Urethral Catheter Temperature probe (Active)   Amt returned on insertion(mL) 400 mL 03/05/22 2317   Reasons to continue Urinary Catheter  Accurate I&O assessment in critically ill patients (48 hr  max) 03/08/22 0000   Goal for Removal Voiding trial when ambulation improves 03/08/22 0000   Site Assessment Clean;Skin intact 03/08/22 0000   Hayes Care Done 03/08/22 0900   Collection Container Standard drainage bag 03/08/22 0000   Securement Method Securing device (Describe) 03/08/22 0000   Output (mL) 55 mL 03/08/22 0911       Lab Results:   Results from last 7 days   Lab Units 03/08/22  0432 03/08/22  0253 03/07/22  2305 03/07/22  1649 03/07/22  1221 03/07/22  1022 03/07/22  1022 03/07/22  0520 03/06/22  0555 03/06/22  0555   0000   WBC Thousand/uL 16 24* 16 54* 16 28*  --   --   --   --  9 26   < > 10 77*   < >   HEMOGLOBIN g/dL 9 5* 9 6* 9 9*  --   --   --   --  9 1*   < > 8 7*   < >   I STAT HEMOGLOBIN g/dl  --   --   --   --  9 2*   < > 9 5*  --   --   --   --    HEMATOCRIT % 29 6* 29 5* 30 7*  --   --   --   --  28 7*   < > 26 6*   < >   HEMATOCRIT, ISTAT %  --   --   --   --  27*   < > 28*  --   --   --   --    PLATELETS Thousands/uL 242 244 236  --   --   --   --  143*   < > 119*   < >   SODIUM mmol/L 139 140 139   < >  --   --   --  142   < > 145  --    POTASSIUM mmol/L 5 0 4 9 4 8   < >  --   --   --  4 1   < > 4 1  --    CHLORIDE mmol/L 114* 113* 113*   < >  --   --   --  116*   < > 116*  --    CO2 mmol/L 20* 21 20*   < >  --   --   --  24   < > 24  --    CO2, I-STAT mmol/L  --   --   --   --  26   < > 26  --   --   --   --    BUN mg/dL 111* 108* 107*   < >  --   --   --  85*   < > 56*  --    CREATININE mg/dL 2 67* 2 53* 2 35*   < >  --   --   --  1 57*   < > 1 32*  --    CALCIUM mg/dL 9 4 10 0 9 8   < >  --   --   --  9 2   < > 9 3  --    MAGNESIUM mg/dL 2 8*  --   --   --   --   --   --  2 3  --  2 0  --    PHOSPHORUS mg/dL 4 8*  --   --   --   --   --   --  4 4*  --  4 1  --    GLUCOSE, ISTAT mg/dl  --   --   --   --  120  --  116  --   --   --   --     < > = values in this interval not displayed

## 2022-03-08 NOTE — PLAN OF CARE
Problem: INFECTION - ADULT  Goal: Absence or prevention of progression during hospitalization  Description: INTERVENTIONS:  - Assess and monitor for signs and symptoms of infection  - Monitor lab/diagnostic results  - Monitor all insertion sites, i e  indwelling lines, tubes, and drains  - Monitor endotracheal if appropriate and nasal secretions for changes in amount and color  - [place] appropriate cooling/warming therapies per order  - Administer medications as ordered  - Instruct and encourage patient and family to use good hand hygiene technique  - Identify and instruct in appropriate isolation precautions for identified infection/condition  Outcome: Progressing     Problem: SAFETY,RESTRAINT: NV/NON-SELF DESTRUCTIVE BEHAVIOR  Goal: Remains free of harm/injury (restraint for non violent/non self-detsructive behavior)  Description: INTERVENTIONS:  - Instruct patient/family regarding restraint use   - Assess and monitor physiologic and psychological status   - Provide interventions and comfort measures to meet assessed patient needs   - Identify and implement measures to help patient regain control  - Assess readiness for release of restraint   Outcome: Progressing

## 2022-03-08 NOTE — PROGRESS NOTES
Vancomycin IV Pharmacy-to-Dose Consultation    Alayna Ortega is a 64 y o  female who is currently receiving Vancomycin IV with management by the Pharmacy Consult service for the treatment of Pneumonia  Assessment/Plan:    The patient's chart was reviewed  Renal function is worsening  There are no signs or symptoms of infusion reactions documented  Based on todays assessment and supratherapeutic level of 25 5, will hold dose today and continue current vancomycin (Day # 5) dosing of 1000 mg IV daily PRN when random level is less than 20, with a plan for random to be drawn at 0400 on 3/9  Pharmacy will continue to follow closely for s/sx of nephrotoxicity, infusion reactions and appropriateness of therapy  BMP and CBC will be ordered per protocol  We will continue to follow the patients culture results and clinical progress daily        Scott Thrasher, PharmD, [address] Clinical Pharmacist  [phone]

## 2022-03-08 NOTE — NURSING NOTE
Family at bedside, want to remove patient from the ventilator at this time  Orders noted to extubate patient, respiratory made aware and to room to disconnect ventilator  Prn ativan given and dilaudid drip infusing per order

## 2022-03-08 NOTE — RESPIRATORY THERAPY NOTE
RT Ventilator Management Note  Carisa Muñiz 64 y o  female MRN: [id number]  Unit/Bed#: ICU 09 Encounter: [id number]      Daily Screen       3/7/2022  0825 3/8/2022  0751          Patient safety screen outcome[de-identified] Failed Failed      Not Ready for Weaning due to[de-identified] Underline problem not resolved Underline problem not resolved              Physical Exam:   Respiratory Pattern: Assisted  Chest Assessment: Chest expansion symmetrical  Bilateral Breath Sounds: Coarse (sl  coarse)  Cough: None      Resp Comments: (P) Pt removed from the vent and placed on RA for comfort care  Veletri also discontinued at this time

## 2022-03-08 NOTE — PROCEDURES
[address] Lung US    Date/Time: 3/7/2022 11:26 PM  Performed by: Michel Bledsoe DO  Authorized by: Michel Bledsoe DO     Patient location:  ICU  Other Assisting Provider: No    Procedure details:     Exam Type:  Diagnostic    Indications: hypoxia      Assessment / Evaluation for:  Pneumothorax    Structures Visualized: pleural line and rib      Exam Type: initial exam      Image quality: diagnostic      Image availability:  Images available in PACS  Left Hemithorax Findings:     Left pleura visualized:  Visualized    Left Hemithorax Findings: normal    Right Lung Findings:     Right pleural visualized:  Visualized    Right hemithorax findings: normal    Interpretation:      Lung sliding visualized bilaterally, confirmed with M mode

## 2022-03-08 NOTE — UTILIZATION REVIEW
Continued Stay Review    Date: 3/8/22                          Current Patient Class: inpatient  Current Level of Care: ICU  HPI:76 y o  female initially admitted on 2/26/22   Assessment/Plan:   HPI/24hr events: Trach performed yesterday  Overnight patient persistently hypoxic to low 80s SpO2  Placed in APRV with limited success, occasionally satting as high as 90% in this mode  POC lung US performed that did not show evidence of pneumothorax  Increasing hypotension requiring escalating pressor support, at one point on high amounts of levo, in addition to ephraim and vaso  Have since weaned ephraim off  Central line in right fem placed due to pressor needs  Patient also febrile with a tmax of 101 1    Renal consulted for MILVIA, worsening creatinine  Per renal: MILVIA most likely secondary to TAPAN (2/28, 3/2) and ischemic injury from hemodynamic perturbations plus sepsis and subsequent ATN  Baseline Cr 08-1 0  No acute indication for dialysis at this time  If patient continues to have low urine output a worsening electrolytes dialysis may be warranted  As per discussion with primary team that discussed with patient's family there will be no further escalation of care  Will not be proceeding with dialysis even if the need arises  Continue medical management  Recommend D5 water +150 mg sodium bicarb at 50 cc an hour for 1 L and  Recommend Lasix 80 mg IV if tolerable based on hemodynamics  check BMP in a m      Vital Signs:   03/08/22 1206 -- -- -- -- -- -- -- 94 % -- -- --   03/08/22 1200 101 5 °F (38 6 °C) Abnormal  84 11 Abnormal  106/66 78 142/54 74 mmHg 95 % 100 Ventilator Lying   03/08/22 1100 101 5 °F (38 6 °C) Abnormal  84 11 Abnormal  115/55 70 136/52 72 mmHg 93 % -- -- --   03/08/22 1000 101 5 °F (38 6 °C) Abnormal  82 11 Abnormal  111/72 90 130/50 70 mmHg 91 % -- -- --   03/08/22 0900 100 8 °F (38 2 °C) Abnormal  80 11 Abnormal  132/66 80 164/56 82 mmHg 94 % -- -- --   03/08/22 0800 100 8 °F (38 2 °C) Abnormal  82 12 124/67 86 134/52 72 mmHg 92 % 100 Ventilator Lying     Pertinent Labs/Diagnostic Results:   Results from last 7 days   Lab Units 03/02/22  1351   SARS-COV-2  Negative     Results from last 7 days   Lab Units 03/08/22 0432 03/08/22 0253 03/07/22 2305 03/07/22  1221 03/07/22  1022 03/07/22  0520 03/06/22  0555 03/05/22  0527 03/04/22  0544 03/03/22  0506 03/02/22  1355 03/02/22  0404   WBC Thousand/uL 16 24* 16 54* 16 28*  --   --    < >   < > 10 66*   < > 17 27*   < > 9 29   HEMOGLOBIN g/dL 9 5* 9 6* 9 9*  --   --   --    < > 8 1*   < > 9 4*   < > 9 0*   I STAT HEMOGLOBIN g/dl  --   --   --  9 2* 9 5*  --   --   --   --   --   --   --    HEMATOCRIT % 29 6* 29 5* 30 7*  --   --   --    < > 25 5*   < > 27 9*   < > 26 6*   HEMATOCRIT, ISTAT %  --   --   --  27* 28*  --   --   --   --   --   --   --    PLATELETS Thousands/uL 242 244 236  --   --    < >   < > 104*   < > 141*   < > 125*   NEUTROS ABS Thousands/µL  --   --   --   --   --   --   --   --   --   --   --  7 60   BANDS PCT %  --   --  14*  --   --   --   --  13*  --  7   < >  --     < > = values in this interval not displayed       Results from last 7 days   Lab Units 03/08/22 0432 03/08/22 0253 03/07/22 2305 03/07/22  1649 03/07/22  1221 03/07/22  1022 03/07/22  1022 03/07/22  0520 03/06/22  0555 03/06/22  0555 03/05/22  0527 03/05/22  0527 03/04/22  0544 03/04/22  0544   SODIUM mmol/L 139 140 139 140  --   --   --  142   < > 145   < > 142   < > 142   POTASSIUM mmol/L 5 0 4 9 4 8 4 7  --   --   --  4 1   < > 4 1   < > 4 0   < > 4 2   CHLORIDE mmol/L 114* 113* 113* 113*  --   --   --  116*   < > 116*   < > 116*   < > 118*   CO2 mmol/L 20* 21 20* 23  --   --   --  24   < > 24   < > 25   < > 24   CO2, I-STAT mmol/L  --   --   --   --  26   < > 26  --   --   --   --   --   --   --    ANION GAP mmol/L 5 6 6 4  --   --   --  2*   < > 5   < > 1*   < > 0*   BUN mg/dL 111* 108* 107* 90*  --   --   --  85*   < > 56*   < > 41*   < > 36*   CREATININE mg/dL 2 67* 2 53* 2 35* 1 91*  --   --   --  1 57*   < > 1 32*   < > 0 95   < > 1 00   EGFR ml/min/1 73sq m 16 17 19 24  --   --   --  31   < > 38   < > 57   < > 54   CALCIUM mg/dL 9 4 10 0 9 8 9 4  --   --   --  9 2   < > 9 3   < > 9 6   < > 9 1   CALCIUM, IONIZED mmol/L  --   --   --   --   --   --   --  1 30  --   --   --  1 30  --  1 23   CALCIUM, IONIZED, ISTAT mmol/L  --   --   --   --  1 36*  --  1 40*  --   --   --   --   --   --   --    MAGNESIUM mg/dL 2 8*  --   --   --   --   --   --  2 3  --  2 0  --  2 0  --  2 0   PHOSPHORUS mg/dL 4 8*  --   --   --   --   --   --  4 4*  --  4 1  --  2 8  --  2 1*    < > = values in this interval not displayed       Results from last 7 days   Lab Units 03/02/22  0556 03/02/22  0022 03/01/22  1804   POC GLUCOSE mg/dl 122 115 121     Results from last 7 days   Lab Units 03/08/22  0432 03/08/22  0253 03/07/22  2305 03/07/22  1649 03/07/22  0520 03/06/22  0555 03/05/22  0527 03/04/22  0544 03/03/22  1423 03/03/22  0506 03/02/22  1355 03/02/22  0404   GLUCOSE RANDOM mg/dL 132 135 128 119 130 144* 175* 118 162* 142* 130 128     Results from last 7 days   Lab Units 03/08/22  1211 03/08/22  0846 03/08/22  0432   PH ART  7 328* 7 347* 7 368   PCO2 ART mm Hg 36 2 33 4* 33 5*   PO2 ART mm Hg 71 4* 66 0* 60 1*   HCO3 ART mmol/L 18 6* 17 9* 18 8*   BASE EXC ART mmol/L -6 7 -6 9 -5 7   O2 CONTENT ART mL/dL 13 9* 13 5* 13 4*   O2 HGB, ARTERIAL % 92 0* 90 9* 89 1*   ABG SOURCE  Line, Arterial Line, Arterial Line, Arterial     Results from last 7 days   Lab Units 03/07/22  1221 03/07/22  1022   PH, MIMI I-STAT  7 302  --    PCO2, MIMI ISTAT mm HG 49 8  --    PO2, MIMI ISTAT mm HG 58 0*  --    HCO3, MIMI ISTAT mmol/L 24 6  --    I STAT BASE EXC mmol/L -2 -1   I STAT O2 SAT % 86* 87*   ISTAT PH ART   --  7 337*   I STAT ART PCO2 mm HG  --  46 4*   I STAT ART PO2 mm HG  --  58 0*   I STAT ART HCO3 mmol/L  --  24 8     Results from last 7 days   Lab Units 03/03/22  0359 03/03/22  0214 03/03/22  0028   HS TNI 0HR ng/L  --   --  101*   HS TNI 2HR ng/L  --  86*  --    HSTNI D2 ng/L  --  -15  --    HS TNI 4HR ng/L 80*  --   --    HSTNI D4 ng/L -21  --   --      Results from last 7 days   Lab Units 03/08/22  0630 03/08/22  0432 03/06/22  0555 03/06/22  0057 03/04/22  0544   PROCALCITONIN ng/ml 26 07* 24 16* 8 62* 8 55* 8 19*     Results from last 7 days   Lab Units 03/02/22  1840 03/02/22  1355   LACTIC ACID mmol/L 1 9 2 4*     Results from last 7 days   Lab Units 03/03/22  0024   NT-PRO BNP pg/mL 11,374*     Results from last 7 days   Lab Units 03/05/22  2329   CLARITY UA  Turbid   COLOR UA  Yellow   SPEC GRAV UA  1 020   PH UA  5 5   GLUCOSE UA mg/dl Negative   KETONES UA mg/dl Negative   BLOOD UA  Moderate*   PROTEIN UA mg/dl 100 (2+)*   NITRITE UA  Negative   BILIRUBIN UA  Negative   UROBILINOGEN UA (BE) mg/dl 3 0*   LEUKOCYTES UA  Negative   WBC UA /hpf 2-4*   RBC UA /hpf 2-4*   BACTERIA UA /hpf Occasional   EPITHELIAL CELLS WET PREP /hpf None Seen     Results from last 7 days   Lab Units 03/02/22  1351   INFLUENZA A PCR  Negative   INFLUENZA B PCR  Negative   RSV PCR  Negative     Results from last 7 days   Lab Units 03/06/22  1046 03/06/22  0553 03/06/22  0055 03/02/22  1354 03/02/22  1353 03/02/22  1352   BLOOD CULTURE   --   --  No Growth at 48 hrs  No Growth at 48 hrs  No Growth After 5 Days  No Growth After 5 Days    --    SPUTUM CULTURE   --  1+ Growth of Serratia marcescens*  3+ Growth of   --   --   --  2+ Growth of Haemophilus influenzae*  2+ Growth of Serratia marcescens*  2+ Growth of    GRAM STAIN RESULT  2+ Polys*  1+ Gram negative rods* 2+ Gram positive rods*  2+ Polys*  --   --   --  4+ Polys*  Rare Gram positive rods*  Rare Gram negative rods*  Rare Gram positive cocci in pairs*     Medications:   acetaminophen, 975 mg, Oral, Q8H RAMÍREZ  cefepime, 1,000 mg, Intravenous, Q12H  chlorhexidine, 15 mL, Mouth/Throat, Q12H RAMÍREZ  famotidine, 20 mg, Intravenous, Q24H RAMÍREZ  heparin (porcine), 5,000 Units, Subcutaneous, Q8H [facility]  hydrocortisone sodium succinate, 50 mg, Intravenous, Q6H RAMÍREZ  neomycin-polymyxin B (NEOSPORIN ) irrigation solution, , Irrigation, Once    Continuous IV Infusions:  dexmedetomidine, 0 1-1 4 mcg/kg/hr, Intravenous, Titrated  epoprostenol, 6 25-50 ng/kg/min (Ideal), Inhalation, Titrated  HYDROmorphone, 1 mg/hr, Intravenous, Continuous  norepinephrine, 1-30 mcg/min, Intravenous, Titrated  vasopressin, 0 04 Units/min, Intravenous, Continuous    PRN Meds:  HYDROmorphone, 0 5 mg, Intravenous, Q3H PRN  polyethylene glycol, 17 g, Oral, Daily PRN  senna-docusate sodium, 1 tablet, Oral, HS PRN    Discharge Plan: d    Network Utilization Review Department  ATTENTION: Please call with any questions or concerns to [phone] and carefully listen to the prompts so that you are directed to the right person  All voicemails are confidential   Harinder Watkins all requests for admission clinical reviews, approved or denied determinations and any other requests to dedicated fax number below belonging to the campus where the patient is receiving treatment   List of dedicated fax numbers for the Facilities:  [address] DENIALS (Administrative/Medical Necessity) [phone]   [address] (Maternity/NICU/Pediatrics) [phone]   [address] [place] [address] [address] [place] [address] [facility] [place] [address]avy[address] [place] [address] [address] [place] [address] Tammy Jiménez [address] [facility] [place] [address] [phone]

## 2022-03-08 NOTE — DISCHARGE SUMMARY
Discharge Summary - Christiana Hartmann 64 y o  female MRN: [id number]    Unit/Bed#: ICU 09 Encounter: [id number] PCP: Fercho Crabtree MD    Admission Date:   Admission Orders (From admission, onward)     Ordered        02/26/22 1949  Inpatient Admission  Once                        Admitting Diagnosis: Cervical spine fracture ([address] ) [S12  9XXA]  Multiple abrasions [T07  XXXA]    HPI:   Per Dr Basilia Aguila 2/26/22  "Christiana Hartmann is a 64 y o  female with h/o hypertension who presents after she fell  She tripped in appliance store and she tripped on a mat and fell forward striking her head against a washing mashine and hyper extended her neck  No LOC  She complained neck pain, No other injury  She presented to [place] ED by driving herself  CT scan revealed "Nondisplaced fractures in the lamina bilaterally and spinous process at C5 and C6   Displaced ossific focus anterior to the C6-7 disc space suggesting avulsed displaced anterior osteophyte  Large soft tissue abnormality anterior to the C6 and C7 likely represent prevertebral hematoma " She was transferred to [facility] for further management but she started having respiratory distress  She was intubated in ED [place] for airway protection  CTA showed active arterial extravasation within hematoma anterior to the C6-7 disc space narrowing the trachea"     Procedures Performed:   Orders Placed This Encounter   Procedures    Central Line    Intubation    [phone]       Summary of Hospital Course: The patient was admitted on 2/26/22 and required intubation  An attempt with a 7 0 ETT failed due to subglottic stenosis, and she was successfully intubated with a 6 0 ETT   Neurosurgery was consulted and she was taken to interventional radiology on 2/26/22 for exploration of an active extravasation seen on CTA head/neck, but no active bleeding was found upon extensive aniogram  OMFS was also consulted for nasal bone fractures, but no surgery was indicated  She did have an acute blood loss anemia and was transfused 1u PRBC on 3/2/22  The patient was stable and awaiting neurosurgical intervention for her cervical spine injury with prevertebral hematoma, when she became febrile and was started on empiric antibiotics for possible pneumonia  She was also taken for CT PE study, which was negative for pulmonary embolism  Her culture results were positive for serratia and her antibiotics were broadened given her copious secretions and ongoing hypoxia  She also developed atrial fibrillation with RVR, which was controlled with IV metoprolol  Overnight into 3/7/22, she had acute respiratory decompensation requiring an increase in ventilator settings  She also was having more cardiac instability with both tachycardia and bradycardia  Despite her high oxygen requirements, she was taken for a tracheostomy  She tolerated the procedure well, but had ongoing high oxygen requirements which continued to worsen  In addition, she had a worsening acute kidney injury which wasn't responsive to IV fluids  She was given a dose of IV lasix in an attempt to make urine and better optimize her oxygenation, but she had a very suboptimal response  She also had worsening hypotension that was not responsive to IV fluids  She was started on vasopressors and a central line was placed on 3/7/22  Her shock state and her acute kidney injury which was coupled with developing metabolic acidosis and hyperkalemia continued to worsen to the point where she would need the initiation of CRRT  Her family was brought in to discuss goals of care, and ultimately decided to transition to comfort care on 3/8/22  She  peacefully with family at bedside         Disposition:      Final Diagnosis:   Principal Problem:    Cervical spine fracture ([facility] )  Active Problems:    Essential hypertension    BMI 29 0-29 9,adult    Hematoma of neck    Acute respiratory failure with hypoxia and hypercapnia ([facility] )    Acute blood loss anemia    MILVIA (acute kidney injury) ([facility] )    Nasal bone fractures    Serratia pneumonia    Septic shock (HCC)    Hyperkalemia    Normal anion gap metabolic acidosis      Condition at Time of Death: Comfort care       Death Note:    INPATIENT DEATH NOTE  Darrell Ards 64 y o  female MRN: [id number]  Unit/Bed#: ICU 09 Encounter: [id number]    Date, Time and Cause of Death    Date of Death: 3/8/22  Time of Death:  4:53 PM  Preliminary Cause of Death: Fall  Entered by: Rj CLIFTON[SP1 1]     Attribution     SP1 1 ETIENNE Rodriguez 22 16:57           Patient's Information  Pronounced by: Rj Luu  Did the patient's death occur in the ED?: No  Did the patient's death occur in the OR?: No  Did the patient's death occur less than 10 days post-op?: Yes  Did the patient's death occur within 24 hours of admission?: No  Was code status DNR at the time of death?: Yes    PHYSICAL EXAM:  Unresponsive to noxious stimuli, Spontaneous respirations absent, Breath sounds absent, Carotid pulse absent, Heart sounds absent and Pupillary light reflex absent    Medical Examiner notification criteria:  Any type of trauma   Medical Examiner's office notified?:  Yes   Medical Examiner accepted case?:  Yes:  to come to evaluate body   Name of Medical Examiner: Ginette Jammie Notification  Was the family notified?: Yes  Date Notified: 22  Time Notified: 3534  Notified by: Rj Luu  Name of Family Notified of Death: Cole Rizvi Person Relationship to Patient: Son  Family Notification Route: At bedside  Was the family told to contact a  home?: Yes    Autopsy Options:  Decision for post-mortem examination not yet made by next of kin  Primary Service Attending Physician notified?:  yes - Other: Dr Noe Renee    Physician/Resident responsible for completing Discharge Summary:  Rj CLIFTON

## 2022-03-08 NOTE — CASE MANAGEMENT
Case Management Progress Note    Patient name Lucho [facility]  Location ICU 09/ICU 09 MRN [id number]  : 1945 Date 3/8/2022       LOS (days): 10  Geometric Mean LOS (GMLOS) (days): 4 30  Days to GMLOS:-5 5        OBJECTIVE:        Current admission status: Inpatient  Preferred Pharmacy:   [facility]/pharmacy #[id number]- [place], [place] - [facility] [address] [address]  Phone: [phone] Fax: [phone]    Primary Care Provider: Desi Gillette MD    Primary Insurance: [address] REP  Secondary Insurance:     PROGRESS NOTE:    CM was informed by pt's nurse, that pt is now Level 4 Comfort Care   CM spoke to medical team and decision was made not to pursue hospice

## 2022-03-08 NOTE — DEATH NOTE
INPATIENT DEATH NOTE  Rosina Slaughter 64 y o  female MRN: [id number]  Unit/Bed#: ICU 09 Encounter: [id number]    Date, Time and Cause of Death    Date of Death: 3/8/22  Time of Death:  4:53 PM  Preliminary Cause of Death: Fall  Entered by: Marge CLIFTON[SP1 1]     Attribution     SP1 1 ETIENNE Marin 22 16:57           Patient's Information  Pronounced by: Marge Ovalle  Did the patient's death occur in the ED?: No  Did the patient's death occur in the OR?: No  Did the patient's death occur less than 10 days post-op?: Yes  Did the patient's death occur within 24 hours of admission?: No  Was code status DNR at the time of death?: Yes    PHYSICAL EXAM:  Unresponsive to noxious stimuli, Spontaneous respirations absent, Breath sounds absent, Carotid pulse absent, Heart sounds absent and Pupillary light reflex absent    Medical Examiner notification criteria:  Any type of trauma   Medical Examiner's office notified?:  Yes   Medical Examiner accepted case?:  Yes:  to come to evaluate body   Name of Medical Examiner: Hina Viramontes Notification  Was the family notified?: Yes  Date Notified: 22  Time Notified: 56  Notified by: Marge Ovalle  Name of Family Notified of Death: Thomas Brunner Person Relationship to Patient: Son  Family Notification Route: At bedside  Was the family told to contact a  home?: Yes    Autopsy Options:  Decision for post-mortem examination not yet made by next of kin  Primary Service Attending Physician notified?:  yes - Other: Dr Ambrocio Locke    Physician/Resident responsible for completing Discharge Summary:  Marge CLIFTON

## 2022-03-09 LAB
BACTERIA BRONCH AEROBE CULT: ABNORMAL
BACTERIA BRONCH AEROBE CULT: ABNORMAL
GRAM STN SPEC: ABNORMAL
GRAM STN SPEC: ABNORMAL

## 2022-03-09 NOTE — CLINICAL RISK MANAGEMENT
Progress Note - Death in [address] [place] [address],Third Floor 64 y o  female MRN: [id number]  Unit/Bed#: ICU 09 Encounter: [id number]      Patient  within 24 hours of restraint  with Soft restraint right wrist and Soft restraint left wrist  Death unrelated to use of restraints  This situation was tracked internally  CMS and PA-RANDALL  notification not required

## 2022-03-09 NOTE — UTILIZATION REVIEW
Notification of Discharge   This is a Notification of Discharge from our facility [address]  Please be advised that this patient has been discharge from our facility  Below you will find the admission and discharge date and time including the patients disposition  UTILIZATION REVIEW CONTACT:  Deni Delaney  Utilization   Network Utilization Review Department  Phone: [phone] x carefully listen to the prompts  All voicemails are confidential   Email: [email or web address]     PHYSICIAN ADVISORY SERVICES:  FOR YLEU-IS-QDYD REVIEW - MEDICAL NECESSITY DENIAL  Phone: [phone]  Fax: [phone]  Email: [email or web address]     PRESENTATION DATE: 2022  5:25 PM  OBERVATION ADMISSION DATE:   INPATIENT ADMISSION DATE: 22  7:41 PM   DISCHARGE DATE: 3/8/2022  7:00 PM  DISPOSITION:        IMPORTANT INFORMATION:  Send all requests for admission clinical reviews, approved or denied determinations and any other requests to dedicated fax number below belonging to the campus where the patient is receiving treatment   List of dedicated fax numbers:  [address] DENIALS (Administrative/Medical Necessity) [phone]   [address] (Maternity/NICU/Pediatrics) [phone]   Kristel Bloom [phone]   130 [facility] [phone]   [address] [place] [address] [phone]   [address] [place] [address] [address] [place] [address],[address] [phone]   [facility]  [phone]   [address] [facility] [address] [facility]  24[address] [facility] 1000 [facility] [phone]

## 2022-03-11 LAB
BACTERIA BLD CULT: NORMAL
BACTERIA BLD CULT: NORMAL

## 2022-03-17 LAB — FUNGUS SPEC CULT: ABNORMAL

## 2022-04-26 LAB
MYCOBACTERIUM SPEC CULT: NORMAL
RHODAMINE-AURAMINE STN SPEC: NORMAL

## 2023-02-07 NOTE — ASSESSMENT & PLAN NOTE
1. \"Have you been to the ER, urgent care clinic since your last visit? Hospitalized since your last visit? \" No    2. \"Have you seen or consulted any other health care providers outside of the [address] [place] [address] since your last visit? \" No     3. For patients aged 39-70: Has the patient had a colonoscopy / FIT/ Cologuard? NA - based on age      If the patient is female:    4. For patients aged 41-77: Has the patient had a mammogram within the past 2 years? NA - based on age or sex      11. For patients aged 21-65: Has the patient had a pap smear?  NA - based on age or sex    Chief Complaint   Patient presents with    Follow Up Chronic Condition    Hypertension    Dementia    Cholesterol Problem    Chronic Kidney Disease     Visit Vitals  BP (!) 155/78 (BP 1 Location: Right upper arm, BP Patient Position: Sitting, BP Cuff Size: Adult)   Pulse (!) 54   Temp 98 °F (36.7 °C) (Oral)   Resp 20   Ht 5' 6\" (1.676 m)   Wt 134 lb 12.8 oz (61.1 kg)   SpO2 99%   BMI 21.76 kg/m² BMI Counseling: Body mass index is 29 95 kg/m²  The BMI is above normal  Nutrition recommendations include reducing portion sizes, decreasing overall calorie intake and 3-5 servings of fruits/vegetables daily  Exercise recommendations include moderate aerobic physical activity for 150 minutes/week

## (undated) DEVICE — CHLORAPREP HI-LITE 26ML ORANGE

## (undated) DEVICE — SUCTION CATH 18 FR

## (undated) DEVICE — BETHLEHEM UNIVERSAL OUTPATIENT: Brand: CARDINAL HEALTH

## (undated) DEVICE — INTENDED FOR TISSUE SEPARATION, AND OTHER PROCEDURES THAT REQUIRE A SHARP SURGICAL BLADE TO PUNCTURE OR CUT.: Brand: BARD-PARKER SAFETY BLADES SIZE 15, STERILE

## (undated) DEVICE — ASTOUND STANDARD SURGICAL GOWN, XL: Brand: CONVERTORS

## (undated) DEVICE — TUBING SUCTION 5MM X 12 FT

## (undated) DEVICE — NEEDLE 25G X 1 1/2

## (undated) DEVICE — GLOVE SRG BIOGEL ORTHOPEDIC 7.5

## (undated) DEVICE — DRAIN SPONGES,6 PLY: Brand: EXCILON

## (undated) DEVICE — 3000CC GUARDIAN II: Brand: GUARDIAN

## (undated) DEVICE — INTENDED FOR TISSUE SEPARATION, AND OTHER PROCEDURES THAT REQUIRE A SHARP SURGICAL BLADE TO PUNCTURE OR CUT.: Brand: BARD-PARKER SAFETY BLADES SIZE 11, STERILE

## (undated) DEVICE — PLUMEPEN PRO 10FT

## (undated) DEVICE — ROSEBUD DISSECTORS: Brand: DEROYAL

## (undated) DEVICE — TIBURON SPLIT SHEET: Brand: CONVERTORS

## (undated) DEVICE — SPONGE LAP 18 X 18 IN STRL RFD